# Patient Record
Sex: FEMALE | Race: BLACK OR AFRICAN AMERICAN | NOT HISPANIC OR LATINO | Employment: FULL TIME | URBAN - METROPOLITAN AREA
[De-identification: names, ages, dates, MRNs, and addresses within clinical notes are randomized per-mention and may not be internally consistent; named-entity substitution may affect disease eponyms.]

---

## 2017-01-04 ENCOUNTER — OFFICE VISIT (OUTPATIENT)
Dept: INTERNAL MEDICINE | Facility: CLINIC | Age: 32
End: 2017-01-04

## 2017-01-04 VITALS
SYSTOLIC BLOOD PRESSURE: 112 MMHG | BODY MASS INDEX: 39.56 KG/M2 | WEIGHT: 215 LBS | HEIGHT: 62 IN | DIASTOLIC BLOOD PRESSURE: 72 MMHG | TEMPERATURE: 98.1 F

## 2017-01-04 DIAGNOSIS — J01.00 ACUTE MAXILLARY SINUSITIS, RECURRENCE NOT SPECIFIED: Primary | ICD-10-CM

## 2017-01-04 DIAGNOSIS — Z72.0 TOBACCO ABUSE: ICD-10-CM

## 2017-01-04 PROCEDURE — 99213 OFFICE O/P EST LOW 20 MIN: CPT | Performed by: FAMILY MEDICINE

## 2017-01-04 RX ORDER — AMOXICILLIN AND CLAVULANATE POTASSIUM 875; 125 MG/1; MG/1
1 TABLET, FILM COATED ORAL 2 TIMES DAILY
Qty: 14 TABLET | Refills: 0 | Status: SHIPPED | OUTPATIENT
Start: 2017-01-04 | End: 2017-01-11

## 2017-01-04 NOTE — PROGRESS NOTES
"Subjective   Yasmin Singleton is a 31 y.o. female who presents with complaint of URI      History of Present Illness   Patient reports having nasal congestion, sinus pressure, runny nose, productive cough, sneezing, abdominal discomfort, nausea starting Monday.  Problem started 2 days ago.  Reports having sinus issues whenever the weather changes, but denies recurrent infections requiring antibiotic use.  Also denies having seasonal allergies.  Has not tried taking any OTC medications for her symptoms.  Did not get her influenza vaccine.  Reports recent sick contacts.  Continues to smoke about 1/2 PPD.    Review of Systems   Constitutional: Positive for chills. Negative for activity change, appetite change and fever.   HENT: Positive for congestion, rhinorrhea, sinus pressure, sneezing and sore throat (with coughing). Negative for nosebleeds.    Eyes: Negative for discharge and redness.   Respiratory: Positive for cough (productive). Negative for shortness of breath and wheezing.    Cardiovascular: Negative for chest pain and palpitations.   Gastrointestinal: Positive for nausea. Negative for abdominal pain, constipation, diarrhea and vomiting.        Positive for abdominal discomfort.   Neurological: Negative for dizziness, syncope and headaches.       The following portions of the patient's history were reviewed and updated as appropriate: allergies, current medications, past social history and problem list.    Objective   Visit Vitals   • /72   • Temp 98.1 °F (36.7 °C)   • Ht 62.25\" (158.1 cm)   • Wt 215 lb (97.5 kg)   • LMP 11/10/2016   • BMI 39.01 kg/m2     Physical Exam   Constitutional: She is oriented to person, place, and time. She appears well-developed and well-nourished.   No acute distress.   HENT:   Head: Normocephalic and atraumatic.   Right Ear: Hearing, tympanic membrane, external ear and ear canal normal.   Left Ear: Hearing, tympanic membrane, external ear and ear canal normal.   Nose: Mucosal " edema and rhinorrhea present. Right sinus exhibits no maxillary sinus tenderness and no frontal sinus tenderness. Left sinus exhibits no maxillary sinus tenderness and no frontal sinus tenderness.   Mouth/Throat: Mucous membranes are normal. Posterior oropharyngeal erythema present.   Eyes: Conjunctivae and EOM are normal. Pupils are equal, round, and reactive to light.   Neck: Normal range of motion. Neck supple.   Cardiovascular: Normal rate, regular rhythm, normal heart sounds and intact distal pulses.    Pulmonary/Chest: Effort normal and breath sounds normal. No respiratory distress. She has no wheezes.   Abdominal: Soft. Bowel sounds are normal. There is no tenderness.   Musculoskeletal: Normal range of motion.   Normal gait.   Lymphadenopathy:        Head (right side): Submandibular adenopathy present.        Head (left side): Submandibular adenopathy present.   Neurological: She is alert and oriented to person, place, and time.   Skin: Skin is warm and dry.   Psychiatric: She has a normal mood and affect. Her behavior is normal.   Vitals reviewed.      Assessment/Plan   Yasmin was seen today for uri.    Diagnoses and all orders for this visit:    Acute maxillary sinusitis, recurrence not specified  -     amoxicillin-clavulanate (AUGMENTIN) 875-125 MG per tablet; Take 1 tablet by mouth 2 (Two) Times a Day for 7 days.    Likely viral etiology at this time.  Recommended conservative treatment with warm compresses, nasal saline and OTC medications as needed for her symptoms.  A conditional prescription for Augmentin was also provided.  Discussed conditions for antibiotic use.  Advised patient to return to the clinic if their symptoms worsen despite antibiotic therapy.    Tobacco abuse    Encouraged smoking cessation today.

## 2017-01-04 NOTE — PATIENT INSTRUCTIONS
You likely have a sinus infection due to a virus.  Viral infections do not improve with antibiotics.  Recommend that you try using warm compresses, nasal saline and/or taking over the counter (OTC) medications for your symptoms as needed.  Please follow the package directions.  Recommend that you try the following OTC medications: DayQuil/NyQuil, Flonase, Tylenol, Ibuprofen as needed.  Symptoms will likely start to improve within 7-10 days.  I have given you an antibiotic prescription.  If your symptoms do not start improving after 10 days or you start experiencing worsening symptoms (including fevers, sweats, chills, headaches, shortness of breath), please fill the antibiotic prescription and take as directed.  Please work on quitting smoking.  Please follow-up as indicated.  Return to the clinic sooner if your symptoms worsen despite taking your antibiotic or if you have any other concerns.

## 2017-01-04 NOTE — MR AVS SNAPSHOT
Yasmin Singleton   1/4/2017 2:30 PM   Office Visit    Dept Phone:  [phone]   Encounter #:  [id number]    Provider:  Irena Lowery MD   Department:  [facility] PRIMARY CARE                Your Full Care Plan              Today's Medication Changes          These changes are accurate as of: 1/4/17  3:14 PM.  If you have any questions, ask your nurse or doctor.               New Medication(s)Ordered:     amoxicillin-clavulanate 875-125 MG per tablet   Commonly known as:  AUGMENTIN   Take 1 tablet by mouth 2 (Two) Times a Day for 7 days.   Started by:  Irena Lowery MD            Where to Get Your Medications      You can get these medications from any pharmacy     Bring a paper prescription for each of these medications     amoxicillin-clavulanate 875-125 MG per tablet                  Your Updated Medication List          This list is accurate as of: 1/4/17  3:14 PM.  Always use your most recent med list.                amoxicillin-clavulanate 875-125 MG per tablet   Commonly known as:  AUGMENTIN   Take 1 tablet by mouth 2 (Two) Times a Day for 7 days.       clomiPHENE 50 MG tablet   Commonly known as:  CLOMID   Take 1 tablet by mouth Daily. 1 po cycle days 3-7       glucose blood test strip   Check blood sugar twice a week as directed on package.       metFORMIN  MG 24 hr tablet   Commonly known as:  GLUCOPHAGE-XR   Take 1 tablet by mouth 2 (Two) Times a Day.       ONE TOUCH CLUB LANCETS misc   Use as directed on package.       prenatal vitamin 27-0.8 27-0.8 MG tablet tablet               You Were Diagnosed With        Codes Comments    Acute maxillary sinusitis, recurrence not specified    -  Primary ICD-10-CM: J01.00  ICD-9-CM: 461.0       Instructions    You likely have a sinus infection due to a virus.  Viral infections do not improve with antibiotics.  Recommend that you try using warm compresses, nasal saline and/or taking over the counter (OTC)  medications for your symptoms as needed.  Please follow the package directions.  Recommend that you try the following OTC medications: DayQuil/NyQuil, Flonase, Tylenol, Ibuprofen as needed.  Symptoms will likely start to improve within 7-10 days.  I have given you an antibiotic prescription.  If your symptoms do not start improving after 10 days or you start experiencing worsening symptoms (including fevers, sweats, chills, headaches, shortness of breath), please fill the antibiotic prescription and take as directed.  Please work on quitting smoking.  Please follow-up as indicated.  Return to the clinic sooner if your symptoms worsen despite taking your antibiotic or if you have any other concerns.     Patient Instructions History      Upcoming Appointments     Visit Type Date Time Department    OFFICE VISIT 2017  2:30 PM [facility]    FOLLOW UP 2017  1:30 PM [facility]    GYN FOLLOW UP 2017  2:40 PM [facility]    US [facility] NON-OB TRANSVAGINAL 2017  2:00 PM  [facility]      [employer] Signup     [facility] allows you to send messages to your doctor, view your test results, renew your prescriptions, schedule appointments, and more. To sign up, go to [employer] and click on the Sign Up Now link in the New User? box. Enter your [employer] Activation Code exactly as it appears below along with the last four digits of your Social Security Number and your Date of Birth () to complete the sign-up process. If you do not sign up before the expiration date, you must request a new code.    [employer] Activation Code: [email or web address]  Expires: 2017  3:14 PM    If you have questions, you can email [email or web address] or call [phone] to talk to our [employer] staff. Remember, [employer] is NOT to be used for urgent needs. For medical emergencies, dial 911.               Other Info from Your Visit           Your Appointments     2017  1:30 PM  "EST   Follow Up with Irena Lowery MD   [facility] PRIMARY [facility] (--)    [address]  [facility] [address] [phone]           Arrive 15 minutes prior to appointment.            Jun 13, 2017  2:00 PM EDT   US martina non-ob transvaginal with [facility]   [facility]    [place]              No prep. Arrive 30 minutes before your appointment.            Jun 13, 2017  2:40 PM EDT   GYN FOLLOW UP with Segundo Solano MD   [facility] (--)    [address]  [facility] [id number] [phone]              Allergies     No Known Allergies      Reason for Visit     URI           Vital Signs     Blood Pressure Temperature Height Weight Last Menstrual Period Body Mass Index    112/72 98.1 °F (36.7 °C) 62.25\" (158.1 cm) 215 lb (97.5 kg) 11/10/2016 39.01 kg/m2    Smoking Status                   Current Every Day Smoker           Problems and Diagnoses Noted     Acute maxillary sinusitis    -  Primary        "

## 2017-02-20 ENCOUNTER — OFFICE VISIT (OUTPATIENT)
Dept: INTERNAL MEDICINE | Facility: CLINIC | Age: 32
End: 2017-02-20

## 2017-02-20 ENCOUNTER — TRANSCRIBE ORDERS (OUTPATIENT)
Dept: LAB | Facility: HOSPITAL | Age: 32
End: 2017-02-20

## 2017-02-20 ENCOUNTER — APPOINTMENT (OUTPATIENT)
Dept: LAB | Facility: HOSPITAL | Age: 32
End: 2017-02-20

## 2017-02-20 VITALS
WEIGHT: 216.4 LBS | DIASTOLIC BLOOD PRESSURE: 70 MMHG | TEMPERATURE: 98.2 F | HEIGHT: 62 IN | BODY MASS INDEX: 39.82 KG/M2 | SYSTOLIC BLOOD PRESSURE: 122 MMHG

## 2017-02-20 DIAGNOSIS — R12 HEARTBURN: ICD-10-CM

## 2017-02-20 DIAGNOSIS — E11.9 TYPE 2 DIABETES MELLITUS WITHOUT COMPLICATION, WITHOUT LONG-TERM CURRENT USE OF INSULIN (HCC): Primary | ICD-10-CM

## 2017-02-20 DIAGNOSIS — Z00.00 ROUTINE GENERAL MEDICAL EXAMINATION AT A HEALTH CARE FACILITY: Primary | ICD-10-CM

## 2017-02-20 DIAGNOSIS — E28.2 PCOS (POLYCYSTIC OVARIAN SYNDROME): ICD-10-CM

## 2017-02-20 DIAGNOSIS — Z72.0 TOBACCO ABUSE: ICD-10-CM

## 2017-02-20 LAB
ALBUMIN SERPL-MCNC: 4.5 G/DL (ref 3.2–4.8)
ALBUMIN/GLOB SERPL: 1.6 G/DL (ref 1.5–2.5)
ALP SERPL-CCNC: 61 U/L (ref 25–100)
ALT SERPL W P-5'-P-CCNC: 31 U/L (ref 7–40)
ANION GAP SERPL CALCULATED.3IONS-SCNC: 6 MMOL/L (ref 3–11)
ARTICHOKE IGE QN: 125 MG/DL (ref 0–130)
AST SERPL-CCNC: 22 U/L (ref 0–33)
BILIRUB SERPL-MCNC: 0.3 MG/DL (ref 0.3–1.2)
BUN BLD-MCNC: 6 MG/DL (ref 9–23)
BUN/CREAT SERPL: 10 (ref 7–25)
CALCIUM SPEC-SCNC: 9.4 MG/DL (ref 8.7–10.4)
CHLORIDE SERPL-SCNC: 105 MMOL/L (ref 99–109)
CHOLEST SERPL-MCNC: 174 MG/DL (ref 0–200)
CO2 SERPL-SCNC: 27 MMOL/L (ref 20–31)
CREAT BLD-MCNC: 0.6 MG/DL (ref 0.6–1.3)
GFR SERPL CREATININE-BSD FRML MDRD: 142 ML/MIN/1.73
GLOBULIN UR ELPH-MCNC: 2.9 GM/DL
GLUCOSE BLD-MCNC: 90 MG/DL (ref 70–100)
HDLC SERPL-MCNC: 40 MG/DL (ref 40–60)
POTASSIUM BLD-SCNC: 4 MMOL/L (ref 3.5–5.5)
PROT SERPL-MCNC: 7.4 G/DL (ref 5.7–8.2)
SODIUM BLD-SCNC: 138 MMOL/L (ref 132–146)
TRIGL SERPL-MCNC: 77 MG/DL (ref 0–150)

## 2017-02-20 PROCEDURE — 80061 LIPID PANEL: CPT | Performed by: FAMILY MEDICINE

## 2017-02-20 PROCEDURE — 99214 OFFICE O/P EST MOD 30 MIN: CPT | Performed by: FAMILY MEDICINE

## 2017-02-20 PROCEDURE — 36415 COLL VENOUS BLD VENIPUNCTURE: CPT | Performed by: FAMILY MEDICINE

## 2017-02-20 PROCEDURE — 80053 COMPREHEN METABOLIC PANEL: CPT | Performed by: FAMILY MEDICINE

## 2017-02-20 NOTE — PROGRESS NOTES
"Subjective   Yasmin Singleton is a 31 y.o. female who presents to follow-up for Diabetes      History of Present Illness   She was last seen in the office on 11/09/16 for diabetes management.  Previous intervention/treatment includes: restarted Metformin, checked labs and refilled testing supplies.    Patient is here for follow-up of type 2 diabetes mellitus.  Current symptoms/problems include hyperglycemia.  She is adherent with her medication.  Patient states that her OB/Gyn increased her Metformin XR dosing regimen to 500 mg twice a day.    Current monitoring regimen: home blood tests - 2 times daily, meter recently stopped working but patient plans to call company to fix it  Home blood sugar records: 220s-400  Any episodes of hypoglycemia? no    Known diabetic complications: none  Cardiovascular risk factors: obesity (BMI >= 30 kg/m2), sedentary lifestyle and smoking/ tobacco exposure  Current diabetic medications include oral agent (monotherapy): Glucophage XR    Eye exam current (within one year): no  Weight trend: stable  Prior visit with dietician: no  Current diet: in general, an \"unhealthy\" diet  Current exercise: jumping jacks and sit ups    She is on an ACE inhibitor or angiotensin II receptor blocker: no  She is on a statin: no  She is on Aspirin: no    Patient is due for labs.    Patient also reports complaint of heartburn for the past 2 weeks.  Unsure of specific triggers.  Continues to smoke cigarettes.  Has tried taking Tums in the past, which helps for a short period of time.  Denies vomiting, hematemesis, diarrhea.    Review of Systems   Constitutional: Negative for chills and fever.   HENT: Positive for sore throat (with heartburn).    Respiratory: Negative for cough, shortness of breath and wheezing.    Cardiovascular: Negative for chest pain and palpitations.   Gastrointestinal: Positive for nausea (with heartburn). Negative for abdominal pain, constipation, diarrhea and vomiting.        Positive " "for heartburn.   Neurological: Negative for dizziness, syncope and headaches.   Psychiatric/Behavioral: The patient is not nervous/anxious.         Negative for depressed mood.     The following portions of the patient's history were reviewed and updated as appropriate: current medications, past medical history, past social history and problem list.    Objective   Visit Vitals   • /70 (BP Location: Left arm, Patient Position: Sitting, Cuff Size: Adult)   • Temp 98.2 °F (36.8 °C) (Temporal Artery )   • Ht 62.25\" (158.1 cm)   • Wt 216 lb 6.4 oz (98.2 kg)   • BMI 39.26 kg/m2     Physical Exam   Constitutional: She is oriented to person, place, and time. She appears well-developed and well-nourished.   No acute distress.   HENT:   Head: Normocephalic and atraumatic.   Eyes: Conjunctivae and EOM are normal.   Neck: Normal range of motion.   Cardiovascular: Normal rate, regular rhythm, normal heart sounds and intact distal pulses.    Pulmonary/Chest: Effort normal and breath sounds normal. She has no wheezes.   Abdominal: Soft. Bowel sounds are normal. There is no tenderness.   Musculoskeletal: Normal range of motion.   Moves all extremities.   Neurological: She is alert and oriented to person, place, and time.   Skin: Skin is warm and dry.   Psychiatric: She has a normal mood and affect. Her behavior is normal.       Assessment/Plan   Yasmin was seen today for diabetes.    Diagnoses and all orders for this visit:    Type 2 diabetes mellitus without complication, without long-term current use of insulin  -     Comprehensive Metabolic Panel  -     Lipid Panel  -     POC Glycosylated Hemoglobin (Hb A1C)    The above labs were ordered today.  Patient states that her OB/Gyn has been prescribing her Metformin for PCOS management and did not request any refills today.  Will consider starting patient on a baby aspirin and statin medication if indicated by her labs and ASCVD risk.  Advised patient to return to the clinic in " 3 months for next routine follow-up.    Heartburn    Recommended that patient try taking Zantac as needed for her symptoms.  Also recommended other lifestyle changes to help with symptom management.  Advised patient to return to the clinic if their symptoms worsen.    Tobacco abuse    Encouraged smoking cessation today.

## 2017-02-20 NOTE — PATIENT INSTRUCTIONS
Please go to the lab before you leave to have your labs drawn.  You will be called or receive a letter with your results.  I will consider starting you on a baby aspirin and a cholesterol medication based on what your labs show.  Recommend that you try taking Zantac as needed for your heartburn symptoms.  To help manage your heartburn/reflux symptoms, recommend that you avoid caffeine, alcohol, tobacco and spicy/greasy foods.  Sleep with the head of the bed slightly elevated to decrease reflux symptoms at night.  Avoid eating 3-4 hours prior to bedtime.   Please continue taking your other current medications as directed.  Please follow-up as indicated.  Return to the clinic sooner if your symptoms worsen or if you have any other concerns.

## 2017-02-23 ENCOUNTER — LAB (OUTPATIENT)
Dept: INTERNAL MEDICINE | Facility: CLINIC | Age: 32
End: 2017-02-23

## 2017-02-23 ENCOUNTER — TELEPHONE (OUTPATIENT)
Dept: INTERNAL MEDICINE | Facility: CLINIC | Age: 32
End: 2017-02-23

## 2017-02-23 DIAGNOSIS — E11.9 TYPE 2 DIABETES MELLITUS WITHOUT COMPLICATION, WITHOUT LONG-TERM CURRENT USE OF INSULIN (HCC): Primary | ICD-10-CM

## 2017-02-23 LAB — HBA1C MFR BLD: 7.3 %

## 2017-02-23 PROCEDURE — 83036 HEMOGLOBIN GLYCOSYLATED A1C: CPT | Performed by: FAMILY MEDICINE

## 2017-02-24 ENCOUNTER — TELEPHONE (OUTPATIENT)
Dept: INTERNAL MEDICINE | Facility: CLINIC | Age: 32
End: 2017-02-24

## 2017-02-24 DIAGNOSIS — E11.9 TYPE 2 DIABETES MELLITUS WITHOUT COMPLICATION, WITHOUT LONG-TERM CURRENT USE OF INSULIN (HCC): ICD-10-CM

## 2017-02-24 RX ORDER — METFORMIN HYDROCHLORIDE EXTENDED-RELEASE TABLETS 1000 MG/1
1000 TABLET, FILM COATED, EXTENDED RELEASE ORAL 2 TIMES DAILY
Qty: 60 TABLET | Refills: 2 | Status: SHIPPED | OUTPATIENT
Start: 2017-02-24 | End: 2017-05-22

## 2017-02-27 ENCOUNTER — TELEPHONE (OUTPATIENT)
Dept: INTERNAL MEDICINE | Facility: CLINIC | Age: 32
End: 2017-02-27

## 2017-05-22 ENCOUNTER — HOSPITAL ENCOUNTER (OUTPATIENT)
Dept: GENERAL RADIOLOGY | Facility: HOSPITAL | Age: 32
Discharge: HOME | End: 2017-05-22
Admitting: FAMILY MEDICINE

## 2017-05-22 ENCOUNTER — OFFICE VISIT (OUTPATIENT)
Dept: INTERNAL MEDICINE | Facility: CLINIC | Age: 32
End: 2017-05-22

## 2017-05-22 VITALS
RESPIRATION RATE: 20 BRPM | DIASTOLIC BLOOD PRESSURE: 90 MMHG | SYSTOLIC BLOOD PRESSURE: 124 MMHG | BODY MASS INDEX: 39.75 KG/M2 | HEART RATE: 86 BPM | HEIGHT: 62 IN | WEIGHT: 216 LBS

## 2017-05-22 DIAGNOSIS — Z72.0 TOBACCO ABUSE: ICD-10-CM

## 2017-05-22 DIAGNOSIS — E11.9 TYPE 2 DIABETES MELLITUS WITHOUT COMPLICATION, WITHOUT LONG-TERM CURRENT USE OF INSULIN (HCC): ICD-10-CM

## 2017-05-22 DIAGNOSIS — J40 BRONCHITIS: Primary | ICD-10-CM

## 2017-05-22 PROCEDURE — 71020 HC CHEST PA AND LATERAL: CPT

## 2017-05-22 PROCEDURE — 99213 OFFICE O/P EST LOW 20 MIN: CPT | Performed by: FAMILY MEDICINE

## 2017-05-22 RX ORDER — METFORMIN HYDROCHLORIDE 500 MG/1
500 TABLET, EXTENDED RELEASE ORAL
COMMUNITY
Start: 2017-05-13 | End: 2017-08-03

## 2017-05-22 RX ORDER — PREDNISONE 20 MG/1
40 TABLET ORAL DAILY
Qty: 10 TABLET | Refills: 0 | Status: SHIPPED | OUTPATIENT
Start: 2017-05-22 | End: 2017-06-20

## 2017-05-22 RX ORDER — LISINOPRIL 5 MG/1
5 TABLET ORAL DAILY
Qty: 30 TABLET | Refills: 2 | Status: SHIPPED | OUTPATIENT
Start: 2017-05-22 | End: 2017-08-23 | Stop reason: SDDI

## 2017-05-22 RX ORDER — ALBUTEROL SULFATE 90 UG/1
2 AEROSOL, METERED RESPIRATORY (INHALATION) EVERY 6 HOURS PRN
Qty: 1 INHALER | Refills: 0 | Status: SHIPPED | OUTPATIENT
Start: 2017-05-22 | End: 2017-06-20

## 2017-05-23 ENCOUNTER — TELEPHONE (OUTPATIENT)
Dept: INTERNAL MEDICINE | Facility: CLINIC | Age: 32
End: 2017-05-23

## 2017-05-23 DIAGNOSIS — E11.9 TYPE 2 DIABETES MELLITUS WITHOUT COMPLICATION, WITHOUT LONG-TERM CURRENT USE OF INSULIN (HCC): Primary | ICD-10-CM

## 2017-05-25 RX ORDER — LANCETS
EACH MISCELLANEOUS
Qty: 100 EACH | Refills: 12 | Status: SHIPPED | OUTPATIENT
Start: 2017-05-25 | End: 2018-07-02 | Stop reason: SDUPTHER

## 2017-06-20 ENCOUNTER — OFFICE VISIT (OUTPATIENT)
Dept: OBSTETRICS AND GYNECOLOGY | Facility: CLINIC | Age: 32
End: 2017-06-20

## 2017-06-20 VITALS
SYSTOLIC BLOOD PRESSURE: 118 MMHG | WEIGHT: 221 LBS | DIASTOLIC BLOOD PRESSURE: 70 MMHG | BODY MASS INDEX: 40.1 KG/M2 | RESPIRATION RATE: 16 BRPM

## 2017-06-20 DIAGNOSIS — E28.2 PCOS (POLYCYSTIC OVARIAN SYNDROME): Primary | ICD-10-CM

## 2017-06-20 DIAGNOSIS — E11.9 TYPE 2 DIABETES MELLITUS WITHOUT COMPLICATION, WITHOUT LONG-TERM CURRENT USE OF INSULIN (HCC): ICD-10-CM

## 2017-06-20 DIAGNOSIS — Z72.0 TOBACCO ABUSE: ICD-10-CM

## 2017-06-20 PROCEDURE — 99213 OFFICE O/P EST LOW 20 MIN: CPT | Performed by: OBSTETRICS & GYNECOLOGY

## 2017-06-20 NOTE — PROGRESS NOTES
Subjective   Chief Complaint   Patient presents with   • Results     u/s here 17     Yasmin Singleton is a 31 y.o. year old  presenting to be seen with complaints of trouble with her menses.   Current birth control method: none.    The last time she recalls having predictable regular cycles was she has had regular cycles for the past 6 months until she missed one last month.  Had a period on 2017.  Had an ultrasound on cycle day 7 or so and did show small corpus luteum cyst.  She has not been following her diet.  She has not been exercising.  Her sugars are running in the 180s.  Her hemoglobin A1c has fallen from 7.3-6.7 but I told her this is still elevated.  I discussed that with diabetes she would be at increased risk for having a baby with congenital abnormalities.  This also could damage her kidneys and vision, etc.  Have suggested that she really follow a diet that I gave her in the past and she admits she's not doing that..  I would like to make a referral to nutrition so that she can eat healthier.  If that is not successful she may need insulin before conception.  Therefore we'll not refill Clomid.  I don't think she ever really check to see if she ovulated by urine ovulation test kits.  I would like to check a progesterone level on about cycle day 21 or 23 and a fasting insulin and sugar.    Her current partner has a 4-year-old child.  No semen analysis done lately.    · Additional notable symptoms: none  · Changes in weight: weight has been stable  · Recent increase in stress? no  · Is there associated pain ? no    Past 6 month menstrual and contraceptive history:    Patient's last menstrual period was 2017.  Cycle Frequency: regular, predictable and consistent every 28 - 32 days   Menstrual cycle character: flow is typically normal   Cycle Duration: 4 - 5   Number of heavy days of flows: 1   Intermenstrual bleeding present: no   Post-coital bleeding present: no     She is sexually  active.  In the past 12 months there has not been new sexual partners.  Condoms are not typically used.  She would not like to be screened for STD's at today's exam.     The following portions of the patient's history were reviewed and updated as appropriate:current medications, allergies and past surgical history    Review of Systems      Objective   /70  Resp 16  Wt 221 lb (100 kg)  LMP 06/06/2017 Comment: irreg cycles  BMI 40.1 kg/m2    General:  well developed; well nourished  no acute distress  obese - Body mass index is 40.1 kg/(m^2).   Skin:  No suspicious lesions seen   Thyroid: not examined   Lungs:  breathing is unlabored   Heart:  Not performed.   Abdomen: Not performed.   Pelvis: Not performed.     Lab Review   Hemoglobin at hemoglobin A1c etc.    Imaging   Pelvic ultrasound       Assessment   1. Anovulation probably PCO OS./Metabolic condition  2. Her diabetes has not been the best of control and I like her to optimize his prior to conception.  3. Smoking half pack per day and have encouraged her to quit smoking cigarettes     Plan   1. See above nutrition consultation, labs, possible referral to PDC.  2. Follow-up in 3 months sooner should there be any problems    Medications Rx this encounter:  No orders of the defined types were placed in this encounter.         This note was electronically signed.    Segundo Solano MD  June 20, 2017

## 2017-06-25 ENCOUNTER — RESULTS ENCOUNTER (OUTPATIENT)
Dept: OBSTETRICS AND GYNECOLOGY | Facility: CLINIC | Age: 32
End: 2017-06-25

## 2017-06-25 DIAGNOSIS — E28.2 PCOS (POLYCYSTIC OVARIAN SYNDROME): ICD-10-CM

## 2017-06-25 DIAGNOSIS — E11.9 TYPE 2 DIABETES MELLITUS WITHOUT COMPLICATION, WITHOUT LONG-TERM CURRENT USE OF INSULIN (HCC): ICD-10-CM

## 2017-06-28 ENCOUNTER — RESULTS ENCOUNTER (OUTPATIENT)
Dept: OBSTETRICS AND GYNECOLOGY | Facility: CLINIC | Age: 32
End: 2017-06-28

## 2017-06-28 DIAGNOSIS — E28.2 PCOS (POLYCYSTIC OVARIAN SYNDROME): ICD-10-CM

## 2017-06-28 DIAGNOSIS — E11.9 TYPE 2 DIABETES MELLITUS WITHOUT COMPLICATION, WITHOUT LONG-TERM CURRENT USE OF INSULIN (HCC): ICD-10-CM

## 2017-07-05 LAB
ALBUMIN SERPL-MCNC: 4.5 G/DL (ref 3.2–4.8)
ALBUMIN/GLOB SERPL: 1.6 G/DL
ALP SERPL-CCNC: 64 U/L (ref 25–100)
ALT SERPL-CCNC: 41 U/L (ref 7–40)
AST SERPL-CCNC: 29 U/L (ref 0–33)
BILIRUB SERPL-MCNC: 0.2 MG/DL (ref 0.3–1.2)
BUN SERPL-MCNC: 8 MG/DL (ref 9–23)
BUN/CREAT SERPL: 13.3 (ref 7–25)
CALCIUM SERPL-MCNC: 9.6 MG/DL (ref 8.7–10.4)
CHLORIDE SERPL-SCNC: 107 MMOL/L (ref 99–109)
CHOLEST SERPL-MCNC: 176 MG/DL (ref 0–200)
CO2 SERPL-SCNC: 30 MMOL/L (ref 20–31)
CREAT SERPL-MCNC: 0.6 MG/DL (ref 0.6–1.3)
GLOBULIN SER CALC-MCNC: 2.8 G/DL
GLUCOSE SERPL-MCNC: 181 MG/DL (ref 70–100)
HDLC SERPL-MCNC: 36 MG/DL (ref 40–60)
LDLC SERPL CALC-MCNC: 111 MG/DL (ref 0–99)
POTASSIUM SERPL-SCNC: 5.1 MMOL/L (ref 3.5–5.5)
PROT SERPL-MCNC: 7.3 G/DL (ref 5.7–8.2)
SODIUM SERPL-SCNC: 138 MMOL/L (ref 132–146)
TRIGL SERPL-MCNC: 144 MG/DL (ref 0–150)
VLDLC SERPL CALC-MCNC: 29 MG/DL

## 2017-07-06 ENCOUNTER — HOSPITAL ENCOUNTER (OUTPATIENT)
Dept: NUTRITION | Facility: HOSPITAL | Age: 32
Setting detail: RECURRING SERIES
End: 2017-07-06

## 2017-08-03 ENCOUNTER — TELEPHONE (OUTPATIENT)
Dept: INTERNAL MEDICINE | Facility: CLINIC | Age: 32
End: 2017-08-03

## 2017-08-03 NOTE — TELEPHONE ENCOUNTER
Dr. hardy has decided to change metformin to the metformin without the E.R. 2x daily. Pt advised!

## 2017-08-23 ENCOUNTER — OFFICE VISIT (OUTPATIENT)
Dept: INTERNAL MEDICINE | Facility: CLINIC | Age: 32
End: 2017-08-23

## 2017-08-23 VITALS
HEIGHT: 62 IN | BODY MASS INDEX: 38.46 KG/M2 | WEIGHT: 209 LBS | OXYGEN SATURATION: 99 % | SYSTOLIC BLOOD PRESSURE: 118 MMHG | DIASTOLIC BLOOD PRESSURE: 76 MMHG | RESPIRATION RATE: 18 BRPM

## 2017-08-23 DIAGNOSIS — R22.9 SOFT TISSUE SWELLING: ICD-10-CM

## 2017-08-23 DIAGNOSIS — E11.9 TYPE 2 DIABETES MELLITUS WITHOUT COMPLICATION, WITHOUT LONG-TERM CURRENT USE OF INSULIN (HCC): Primary | ICD-10-CM

## 2017-08-23 DIAGNOSIS — Z72.0 TOBACCO ABUSE: ICD-10-CM

## 2017-08-23 LAB
ALBUMIN SERPL-MCNC: 4.7 G/DL (ref 3.2–4.8)
ALBUMIN/GLOB SERPL: 1.7 G/DL (ref 1.5–2.5)
ALP SERPL-CCNC: 68 U/L (ref 25–100)
ALT SERPL-CCNC: 34 U/L (ref 7–40)
AST SERPL-CCNC: 23 U/L (ref 0–33)
BILIRUB SERPL-MCNC: 0.3 MG/DL (ref 0.3–1.2)
BUN SERPL-MCNC: 5 MG/DL (ref 9–23)
BUN/CREAT SERPL: 7.1 (ref 7–25)
CALCIUM SERPL-MCNC: 10.3 MG/DL (ref 8.7–10.4)
CHLORIDE SERPL-SCNC: 107 MMOL/L (ref 99–109)
CHOLEST SERPL-MCNC: 183 MG/DL (ref 0–200)
CO2 SERPL-SCNC: 30 MMOL/L (ref 20–31)
CREAT SERPL-MCNC: 0.7 MG/DL (ref 0.6–1.3)
GLOBULIN SER CALC-MCNC: 2.7 GM/DL
GLUCOSE SERPL-MCNC: 112 MG/DL (ref 70–100)
HBA1C MFR BLD: 7 %
HDLC SERPL-MCNC: 38 MG/DL (ref 40–60)
LDLC SERPL CALC-MCNC: 113 MG/DL (ref 0–100)
POTASSIUM SERPL-SCNC: 4.4 MMOL/L (ref 3.5–5.5)
PROT SERPL-MCNC: 7.4 G/DL (ref 5.7–8.2)
SODIUM SERPL-SCNC: 139 MMOL/L (ref 132–146)
TRIGL SERPL-MCNC: 160 MG/DL (ref 0–150)
VLDLC SERPL CALC-MCNC: 32 MG/DL

## 2017-08-23 PROCEDURE — 83036 HEMOGLOBIN GLYCOSYLATED A1C: CPT | Performed by: FAMILY MEDICINE

## 2017-08-23 PROCEDURE — 99406 BEHAV CHNG SMOKING 3-10 MIN: CPT | Performed by: FAMILY MEDICINE

## 2017-08-23 PROCEDURE — 99214 OFFICE O/P EST MOD 30 MIN: CPT | Performed by: FAMILY MEDICINE

## 2017-08-23 RX ORDER — ALOGLIPTIN AND METFORMIN HYDROCHLORIDE 12.5; 5 MG/1; MG/1
1 TABLET, FILM COATED ORAL 2 TIMES DAILY
Qty: 60 TABLET | Refills: 2 | Status: SHIPPED | OUTPATIENT
Start: 2017-08-23 | End: 2017-12-02 | Stop reason: SDUPTHER

## 2017-08-23 NOTE — PROGRESS NOTES
"Subjective   Yasmin Singleton is a 31 y.o. female who presents to follow-up for Diabetes      History of Present Illness   She was last seen in the office on 05/22/17 for diabetes management.  Previous intervention/treatment includes: continued on Metformin and started on Lisinopril.    Patient is here for follow-up of type 2 diabetes mellitus.  Current symptoms/problems include none.  She is adherent with medication, but states that she has noticed her pill in her stool, mainly when she has diarrhea.     Current monitoring regimen: was previously checking once a day, but states that her insurance no longer covers her lancets and test strips  Home blood sugar records: 188-200  Any episodes of hypoglycemia? no     Known diabetic complications: none  Cardiovascular risk factors: diabetes mellitus, obesity (BMI >= 30 kg/m2), sedentary lifestyle and smoking/ tobacco exposure  Current diabetic medications include oral agent (monotherapy): Glucophage (switched from XR to IR)     Eye exam current (within one year): no, counseled  Weight trend: stable  Current diet: has been trying to cut back on sodas and limit her carb intake  Current exercise: walking \"a little\"     She is on an ACE inhibitor or angiotensin II receptor blocker: no  She is on a statin: no  She is on Aspirin: no  Continues to smoke 1/2 PPD.    Patient is due for labs.    Also reports complaint of a small lump in her right axilla, noticed about 2 weeks ago.  States that lump is tender to touch when she showers.  Lump has been decreasing in size for the past one week.  Denies fevers, sweats, chills, overlying erythema, surrounding induration, purulent discharge or bleeding.    Review of Systems   Constitutional: Negative for chills and fever.   Respiratory: Negative for cough, shortness of breath and wheezing.    Cardiovascular: Negative for chest pain and palpitations.   Gastrointestinal: Negative for abdominal pain, constipation, diarrhea, nausea and " "vomiting.   Skin: Negative for rash and wound.        Positive for palpable tissue mass in right axilla.   Neurological: Negative for dizziness, syncope and headaches.     The following portions of the patient's history were reviewed and updated as appropriate: current medications, past medical history, past social history and problem list.    Objective   /76  Resp 18  Ht 62.25\" (158.1 cm)  Wt 209 lb (94.8 kg)  SpO2 99%  BMI 37.92 kg/m2     Physical Exam   Constitutional: She is oriented to person, place, and time. She appears well-developed and well-nourished.   No acute distress.   HENT:   Head: Normocephalic and atraumatic.   Eyes: Conjunctivae and EOM are normal.   Neck: Normal range of motion.   Cardiovascular: Normal rate, regular rhythm, normal heart sounds and intact distal pulses.    Pulmonary/Chest: Effort normal and breath sounds normal. She has no wheezes.   Abdominal: Soft. Bowel sounds are normal. There is no tenderness.   Musculoskeletal: Normal range of motion.   Moves all extremities.       Neurological Sensory Findings - Unaltered hot/cold right ankle/foot discrimination and unaltered hot/cold left ankle/foot discrimination. Unaltered sharp/dull right ankle/foot discrimination and unaltered sharp/dull left ankle/foot discrimination. No right ankle/foot altered proprioception and no left ankle/foot altered proprioception    Vascular Status -  Her exam exhibits right foot vasculature normal. Her exam exhibits no right foot edema. Her exam exhibits left foot vasculature normal. Her exam exhibits no left foot edema.   Skin Integrity  -  Her right foot skin is intact.     Yasmin 's left foot skin is intact. .  Neurological: She is alert and oriented to person, place, and time.   Skin: Skin is warm and dry.   Palpable approximately 1 cm mass, mobile, no overlying erythema or surrounding induration, mildly tender to palpation, no purulent discharge or bleeding noted.   Psychiatric: She has a " normal mood and affect. Her behavior is normal.   Vitals reviewed.    Assessment/Plan   Yasmin was seen today for diabetes.    Diagnoses and all orders for this visit:    Type 2 diabetes mellitus without complication, without long-term current use of insulin  -     Comprehensive Metabolic Panel  -     Lipid Panel  -     POC Glycosylated Hemoglobin (Hb A1C)  -     MicroAlbumin, Urine, Random  -     Alogliptin-Metformin HCl 12.5-500 MG tablet; Take 1 tablet by mouth 2 (Two) Times a Day.    The above labs were ordered today.  Hgb A1C has increased from 6.7 to 7.0.  Discussed with patient today.  Patient reports seeing her Metformin in her stool and has started taking her medication once a day instead of twice a day.  Will stop Metformin and prescribe the above medication instead.  Recommended that patient schedule an appointment for her diabetic eye exam.  Advised patient to return to the clinic in 3 months for next routine follow-up.     Tobacco abuse    I advised the patient of the risks in continuing to use tobacco.  I also discussed how to quit smoking and the patient has expressed the willingness to quit, but wants to continue quitting on her own.      During this visit, I spent 3-10 mintues counseling the patient regarding smoking cessation.     Soft tissue swelling    Possible cyst versus lymph node.  Improving symptoms per patient report.  Recommended that she continue to monitor and follow-up if symptoms persist or worsen.    More than 15 minutes of this office visit was spent on counseling.

## 2017-08-23 NOTE — PATIENT INSTRUCTIONS
Please go to the lab before you leave to have your labs drawn.  You will be called or receive a letter with your results.  Please stop taking your Metformin and start taking your Janumet instead.  Your new medication has been electronically prescribed and will be at your pharmacy.  Please pick this up and take as directed.  Please schedule an appointment for your diabetic eye exam with an eye doctor.  Please work on quitting smoking.  Please continue taking your other current medications as directed.  Please follow-up as indicated.  Return to the clinic sooner if you have any other concerns.

## 2017-08-24 LAB — MICROALBUMIN UR-MCNC: 16.5 UG/ML

## 2017-09-14 ENCOUNTER — TELEPHONE (OUTPATIENT)
Dept: INTERNAL MEDICINE | Facility: CLINIC | Age: 32
End: 2017-09-14

## 2017-09-14 NOTE — TELEPHONE ENCOUNTER
Pt has a Accucheck Skylar Plus glucose meter and Freestyle lancets and strips. They do not fit. Can pt get something that will work together. Please send to pharmacy and give pt a call to advise.

## 2017-09-21 ENCOUNTER — TELEPHONE (OUTPATIENT)
Dept: INTERNAL MEDICINE | Facility: CLINIC | Age: 32
End: 2017-09-21

## 2017-09-21 NOTE — TELEPHONE ENCOUNTER
Called insurance states free style brand is covered sometimes billing can bill to the  causing insurance to reject. She advised me she will call pharmacy and walk them through the process so it can get approved she also stated 204 lancets per 30 days would be covered when needed.

## 2017-09-27 ENCOUNTER — HOSPITAL ENCOUNTER (OUTPATIENT)
Dept: NUTRITION | Facility: HOSPITAL | Age: 32
Setting detail: RECURRING SERIES
Discharge: HOME | End: 2017-09-27

## 2017-09-27 VITALS — HEIGHT: 62 IN | WEIGHT: 208.5 LBS | BODY MASS INDEX: 38.37 KG/M2

## 2017-09-27 PROCEDURE — 97802 MEDICAL NUTRITION INDIV IN: CPT | Performed by: DIETITIAN, REGISTERED

## 2017-09-29 ENCOUNTER — OFFICE VISIT (OUTPATIENT)
Dept: OBSTETRICS AND GYNECOLOGY | Facility: CLINIC | Age: 32
End: 2017-09-29

## 2017-09-29 VITALS
RESPIRATION RATE: 16 BRPM | BODY MASS INDEX: 37.56 KG/M2 | SYSTOLIC BLOOD PRESSURE: 118 MMHG | DIASTOLIC BLOOD PRESSURE: 70 MMHG | WEIGHT: 207 LBS

## 2017-09-29 DIAGNOSIS — Z72.0 TOBACCO ABUSE: ICD-10-CM

## 2017-09-29 DIAGNOSIS — E28.2 PCOS (POLYCYSTIC OVARIAN SYNDROME): Primary | ICD-10-CM

## 2017-09-29 PROCEDURE — 99213 OFFICE O/P EST LOW 20 MIN: CPT | Performed by: OBSTETRICS & GYNECOLOGY

## 2017-09-29 NOTE — PROGRESS NOTES
Subjective   Chief Complaint   Patient presents with   • Follow-up     Yasmin Singleton is a 32 y.o. year old .  Patient's last menstrual period was 2017.  She presents to be seen because of PCOS follow up . Sugars running 52 to 200 ; she has one more nutrition class - discussed avoiding pasta.potatoes, rice breads as well.   Current birth control method: none.    Past 6 month menstrual and contraceptive history:    Patient's last menstrual period was 2017.  Cycle Frequency: vary between 29 and 35 days   Menstrual cycle character: flow is typically normal   Cycle Duration: 5 - 6 1st 2 days are heavy                   The following portions of the patient's history were reviewed and updated as appropriate:problem list, current medications and allergies    Review of Systems normal bladder - loose stool on regular diarrhea - noted metformin in stool     Objective   /70  Resp 16  Wt 207 lb (93.9 kg)  LMP 2017 Comment: 28 cycle days  BMI 37.56 kg/m2    General:  well developed; well nourished  no acute distress  appears stated age   Skin:  No suspicious lesions seen  Hyperpigmentation noted on neck c/w hirsutism   Thyroid: normal to inspection and palpation   Lungs:  breathing is unlabored   Heart:  Not performed.   Abdomen: soft, non-tender; no masses  no umbilical or inginual hernias are present  no hepato-splenomegaly   Pelvis: Not performed.     Lab Review   CMP and hgb A1c     Imaging   No data reviewed       Assessment   1. PCOS with irregular cycles  2. Smoker - needs to quit!  3. Diabetes type 2 - on new form of metformin     Plan   1. Chart cycles   2. Stop smoking !!  3. Timed IC   4. PNV?/MTV otc is okay    Medications Rx this encounter:  New Medications Ordered This Visit   Medications   • clomiPHENE (CLOMID) 50 MG tablet     Sig: Take 1 tablet by mouth Daily. 1 po cycle days 3-7     Dispense:  5 tablet     Refill:  3          This note was electronically signed.    Segundo DOMÍNGUEZ  MD [place]  September 29, 2017

## 2017-10-23 ENCOUNTER — HOSPITAL ENCOUNTER (OUTPATIENT)
Dept: NUTRITION | Facility: HOSPITAL | Age: 32
Setting detail: RECURRING SERIES
Discharge: HOME | End: 2017-10-23

## 2017-10-23 VITALS — BODY MASS INDEX: 39.2 KG/M2 | WEIGHT: 213 LBS | HEIGHT: 62 IN

## 2017-12-02 DIAGNOSIS — E11.9 TYPE 2 DIABETES MELLITUS WITHOUT COMPLICATION, WITHOUT LONG-TERM CURRENT USE OF INSULIN (HCC): ICD-10-CM

## 2017-12-04 ENCOUNTER — OFFICE VISIT (OUTPATIENT)
Dept: INTERNAL MEDICINE | Facility: CLINIC | Age: 32
End: 2017-12-04

## 2017-12-04 VITALS
HEIGHT: 62 IN | DIASTOLIC BLOOD PRESSURE: 80 MMHG | BODY MASS INDEX: 38.46 KG/M2 | WEIGHT: 209 LBS | RESPIRATION RATE: 18 BRPM | SYSTOLIC BLOOD PRESSURE: 118 MMHG

## 2017-12-04 DIAGNOSIS — Z72.0 TOBACCO ABUSE: ICD-10-CM

## 2017-12-04 DIAGNOSIS — E11.9 TYPE 2 DIABETES MELLITUS WITHOUT COMPLICATION, WITHOUT LONG-TERM CURRENT USE OF INSULIN (HCC): Primary | ICD-10-CM

## 2017-12-04 DIAGNOSIS — Z23 NEED FOR IMMUNIZATION AGAINST INFLUENZA: ICD-10-CM

## 2017-12-04 LAB
ALBUMIN SERPL-MCNC: 4.6 G/DL (ref 3.2–4.8)
ALBUMIN/GLOB SERPL: 1.7 G/DL (ref 1.5–2.5)
ALP SERPL-CCNC: 62 U/L (ref 25–100)
ALT SERPL-CCNC: 33 U/L (ref 7–40)
AST SERPL-CCNC: 18 U/L (ref 0–33)
BILIRUB SERPL-MCNC: 0.3 MG/DL (ref 0.3–1.2)
BUN SERPL-MCNC: 8 MG/DL (ref 9–23)
BUN/CREAT SERPL: 11.4 (ref 7–25)
CALCIUM SERPL-MCNC: 9.9 MG/DL (ref 8.7–10.4)
CHLORIDE SERPL-SCNC: 106 MMOL/L (ref 99–109)
CHOLEST SERPL-MCNC: 161 MG/DL (ref 0–200)
CO2 SERPL-SCNC: 29 MMOL/L (ref 20–31)
CREAT SERPL-MCNC: 0.7 MG/DL (ref 0.6–1.3)
GFR SERPLBLD CREATININE-BSD FMLA CKD-EPI: 118 ML/MIN/1.73
GFR SERPLBLD CREATININE-BSD FMLA CKD-EPI: 98 ML/MIN/1.73
GLOBULIN SER CALC-MCNC: 2.7 GM/DL
GLUCOSE SERPL-MCNC: 144 MG/DL (ref 70–100)
HBA1C MFR BLD: 6.8 %
HDLC SERPL-MCNC: 45 MG/DL (ref 40–60)
LDLC SERPL CALC-MCNC: 86 MG/DL (ref 0–100)
POTASSIUM SERPL-SCNC: 4.3 MMOL/L (ref 3.5–5.5)
PROT SERPL-MCNC: 7.3 G/DL (ref 5.7–8.2)
SODIUM SERPL-SCNC: 141 MMOL/L (ref 132–146)
TRIGL SERPL-MCNC: 150 MG/DL (ref 0–150)
VLDLC SERPL CALC-MCNC: 30 MG/DL

## 2017-12-04 PROCEDURE — 90686 IIV4 VACC NO PRSV 0.5 ML IM: CPT | Performed by: FAMILY MEDICINE

## 2017-12-04 PROCEDURE — 90471 IMMUNIZATION ADMIN: CPT | Performed by: FAMILY MEDICINE

## 2017-12-04 PROCEDURE — 99213 OFFICE O/P EST LOW 20 MIN: CPT | Performed by: FAMILY MEDICINE

## 2017-12-04 PROCEDURE — 83036 HEMOGLOBIN GLYCOSYLATED A1C: CPT | Performed by: FAMILY MEDICINE

## 2017-12-04 RX ORDER — ALOGLIPTIN AND METFORMIN HYDROCHLORIDE 12.5; 5 MG/1; MG/1
TABLET, FILM COATED ORAL
Qty: 60 TABLET | Refills: 1 | Status: SHIPPED | OUTPATIENT
Start: 2017-12-04 | End: 2018-02-07 | Stop reason: SDUPTHER

## 2017-12-04 NOTE — PROGRESS NOTES
"Subjective   Yasmin Singleton is a 32 y.o. female who presents to follow-up for Diabetes      History of Present Illness   She was last seen in the office on 08/23/17 for diabetes management.  Previous intervention/treatment includes: switched from Metformin to Alogliptin-Metformin.    Patient is here for follow-up of type 2 diabetes mellitus.  Current symptoms/problems include none.  She is adherent with her medication.      Current monitoring regimen: checking once a day  Home blood sugar records: 115-145  Any episodes of hypoglycemia? no      Known diabetic complications: none  Cardiovascular risk factors: diabetes mellitus, obesity (BMI >= 30 kg/m2) and smoking/ tobacco exposure  Current diabetic medications include oral agent (monotherapy): Alogliptin-Metformin      Eye exam current (within one year): yes, will request records  Weight trend: stable  Current diet: has been trying to eat healthy, cutting back on sodas and limiting her carb intake  Current exercise: walking 2-3 times per week      She is on an ACE inhibitor or angiotensin II receptor blocker: no  She is on a statin: no  She is on Aspirin: no  Has cut back on smoking, down from 1/2 PPD to 3-10 cigarettes per day.     Patient is due for labs.    Review of Systems   Constitutional: Negative for chills and fever.   Respiratory: Negative for cough, shortness of breath and wheezing.    Cardiovascular: Negative for chest pain and palpitations.   Gastrointestinal: Negative for abdominal pain, constipation, diarrhea, nausea and vomiting.   Neurological: Negative for dizziness, syncope and headaches.     The following portions of the patient's history were reviewed and updated as appropriate: current medications, past medical history, past social history and problem list.    Objective   /80  Resp 18  Ht 62.25\" (158.1 cm)  Wt 209 lb (94.8 kg)  BMI 37.92 kg/m2     Physical Exam   Constitutional: She is oriented to person, place, and time. She appears " well-developed and well-nourished.   No acute distress.   HENT:   Head: Normocephalic and atraumatic.   Eyes: Conjunctivae and EOM are normal.   Neck: Normal range of motion.   Cardiovascular: Normal rate, regular rhythm, normal heart sounds and intact distal pulses.    Pulmonary/Chest: Effort normal and breath sounds normal. She has no wheezes.   Abdominal: Soft. Bowel sounds are normal. There is no tenderness.   Musculoskeletal: Normal range of motion.   Moves all extremities.   Neurological: She is alert and oriented to person, place, and time.   Skin: Skin is warm and dry.   Psychiatric: She has a normal mood and affect. Her behavior is normal.   Vitals reviewed.      Assessment/Plan   Yasmin was seen today for diabetes.    Diagnoses and all orders for this visit:    Type 2 diabetes mellitus without complication, without long-term current use of insulin  -     Comprehensive Metabolic Panel  -     Lipid Panel  -     POC Glycosylated Hemoglobin (Hb A1C)    The above labs were ordered today.  Will adjust current regimen if labs indicate.    Tobacco abuse    Encouraged smoking cessation today.    Need for immunization against influenza  -     Flu Vaccine Quad PF >18YR

## 2017-12-04 NOTE — PATIENT INSTRUCTIONS
Please go to the lab before you leave to have your labs drawn.  You will be called or receive a letter with your results.  Please continue taking your current medications as directed.  Continue to exercise regularly, make healthy diet choices and cut back on your smoking.  Recommend that you go to your pharmacy to get your Tdap vaccine.  Please follow-up as indicated.  Return to the clinic sooner if your blood sugars worsen or if you have any other concerns.

## 2017-12-06 ENCOUNTER — TELEPHONE (OUTPATIENT)
Dept: INTERNAL MEDICINE | Facility: CLINIC | Age: 32
End: 2017-12-06

## 2017-12-06 NOTE — TELEPHONE ENCOUNTER
----- Message from Irena Lowery MD sent at 12/5/2017  2:31 PM EST -----  Please call the patient regarding her recent lab results.  Her Hgb A1C has improved to 6.8, meaning that her diabetes control is at goal.  We will continue her current medication regimen.  Her cholesterol has also improved since we last checked labs.  Recommend that she continue implementing her diet changes because they are helping.  Thanks.

## 2018-01-18 ENCOUNTER — OFFICE VISIT (OUTPATIENT)
Dept: OBSTETRICS AND GYNECOLOGY | Facility: CLINIC | Age: 33
End: 2018-01-18

## 2018-01-18 VITALS
DIASTOLIC BLOOD PRESSURE: 80 MMHG | SYSTOLIC BLOOD PRESSURE: 126 MMHG | WEIGHT: 219 LBS | BODY MASS INDEX: 39.73 KG/M2 | RESPIRATION RATE: 16 BRPM

## 2018-01-18 DIAGNOSIS — N63.0 BREAST LUMP: Primary | ICD-10-CM

## 2018-01-18 DIAGNOSIS — N92.6 IRREGULAR MENSES: ICD-10-CM

## 2018-01-18 PROCEDURE — 99213 OFFICE O/P EST LOW 20 MIN: CPT | Performed by: OBSTETRICS & GYNECOLOGY

## 2018-01-18 NOTE — PROGRESS NOTES
Subjective   Chief Complaint   Patient presents with   • Follow-up     left armpit lump     Yasmin Singleton is a 32 y.o. year old  presenting to be seen because ofA new breast lump in the left axilla.  She had one on the right side but eventually resolved.  Her breasts are also getting tender in general.    She is on Clomid but taken to maybe 2 cycles have not yet checked her urine ovulation kits.  Last period started about 2 days ago Unclear when exactly this was related to her her Clomid but maybe about 30 days.  Discussed it may be ovulating with cyclic breast tenderness and shoulder however she didn't progesterone due to increased right before her period.  Her caffeine intake is minimal.        The following portions of the patient's history were reviewed and updated as appropriate:current medications and allergies    Review of Systems      Objective   /80  Resp 16  Wt 99.3 kg (219 lb)  LMP 2018  BMI 39.73 kg/m2    General:  well developed; well nourished  no acute distress  appears stated age   Breasts:  Examined in supine position  Symmetric without masses or skin dimpling  Nipples normal without inversion, lesions or discharge  There are no palpable axillary nodes  There is a small superficial BB size firm lesion in the inferior aspect of the mid left axilla this is definitely skin associated due to its superficial she hour T     Lab Review   No data reviewed    Imaging   No data reviewed       Assessment   1. Superficial skin sebaceous cyst left axilla breast exam is benign no nodes palpated in left axilla   2. Irregular menses on Clomid unsure whether she is ovulating      Plan   1. Reassurance that this is a skin lesion left me know if he gets any larger we can remove destroy or biopsy it.  2. Take Clomid days 3 through 7.  Today is day 3 check urine ovulation kits  to see if she is ovulating on this dose if not limiting no will bump that up to 100 mg daily cycle days 3 through  7    Medications Rx this encounter:  No orders of the defined types were placed in this encounter.         This note was electronically signed.    Segundo Solano MD  January 18, 2018

## 2018-02-07 ENCOUNTER — TELEPHONE (OUTPATIENT)
Dept: INTERNAL MEDICINE | Facility: CLINIC | Age: 33
End: 2018-02-07

## 2018-02-07 DIAGNOSIS — E11.9 TYPE 2 DIABETES MELLITUS WITHOUT COMPLICATION, WITHOUT LONG-TERM CURRENT USE OF INSULIN (HCC): ICD-10-CM

## 2018-02-07 RX ORDER — ALOGLIPTIN AND METFORMIN HYDROCHLORIDE 12.5; 5 MG/1; MG/1
1 TABLET, FILM COATED ORAL 2 TIMES DAILY
Qty: 60 TABLET | Refills: 5 | Status: SHIPPED | OUTPATIENT
Start: 2018-02-07 | End: 2018-07-31 | Stop reason: SDUPTHER

## 2018-03-30 ENCOUNTER — OFFICE VISIT (OUTPATIENT)
Dept: OBSTETRICS AND GYNECOLOGY | Facility: CLINIC | Age: 33
End: 2018-03-30

## 2018-03-30 VITALS
SYSTOLIC BLOOD PRESSURE: 124 MMHG | WEIGHT: 225 LBS | RESPIRATION RATE: 16 BRPM | DIASTOLIC BLOOD PRESSURE: 80 MMHG | BODY MASS INDEX: 40.82 KG/M2

## 2018-03-30 DIAGNOSIS — N92.6 IRREGULAR MENSES: Primary | ICD-10-CM

## 2018-03-30 PROCEDURE — 99213 OFFICE O/P EST LOW 20 MIN: CPT | Performed by: OBSTETRICS & GYNECOLOGY

## 2018-03-30 NOTE — PROGRESS NOTES
Subjective   Chief Complaint   Patient presents with   • Follow-up     on clomid     Yasmin Singleton is a 32 y.o. year old  presenting to be seen with complaints of trouble with her menses.   Current birth control method: none.She had 1 day slightly 12 the first month she used Clomid whether was 2 lines indicating ovulation but nothing the next day.  Did not have a period until 18 days later.  So I'm not sure if that really was ovulation.  Also the next month no line change at all.  Discussed that we probably about a bump that up to 100 mg and see how she does with that dosage.    The last time she recalls having predictable regular cycles was last few months.      · Additional notable symptoms: night sweats  · Changes in weight: weight has been stable up and down up few pounds discussed watching carbohydrates in particular the 4 PEs  · Recent increase in stress? no  · Is there associated pain ? no    Past 6 month menstrual and contraceptive history:    Patient's last menstrual period was 2018.  Cycle Frequency: regular, predictable and consistent every 28 - 32 days   Menstrual cycle character: flow is typically normal and flow is typically moderately heavy   Cycle Duration: 5 - 6   Number of heavy days of flows: 3   Intermenstrual bleeding present: no   Post-coital bleeding present: no     She is sexually active.  In the past 12 months there has not been new sexual partners.  Condoms are not typically used.  She would not like to be screened for STD's at today's exam.     The following portions of the patient's history were reviewed and updated as appropriate:problem list, current medications and allergies    Review of Systems      Objective   /80   Resp 16   Wt 102 kg (225 lb)   LMP 2018   BMI 40.82 kg/m²     General:  well developed; well nourished  no acute distress   Skin:  No suspicious lesions seen   Thyroid: normal to inspection and palpation   Lungs:  breathing is unlabored    Heart:  Not performed.   Abdomen: Not performed.   Pelvis: Clinical staff was present for exam     Lab Review   Hemoglobin A1c etc. last year    Imaging   No data reviewed       Assessment   1. Irregular menses and ovulation.  She did not get fasting insulin and glucose done as ordered in 2017.  Hemoglobin A1c is high some curious to see what her fasting insulin level is.  I've asked her to do this next week.  2.  We'll also increase Clomid 100 mg days 3 through 7  3.      Plan   1. As above we'll follow up in about 4 months.  2.   She will work on weight loss    Medications Rx this encounter:  New Medications Ordered This Visit   Medications   • clomiPHENE (CLOMID) 50 MG tablet     Sig: Take 1 tablet by mouth 2 (Two) Times a Day. 1 po cycle days 3-7     Dispense:  10 tablet     Refill:  3          This note was electronically signed.    Segundo Solano MD  March 30, 2018

## 2018-04-02 ENCOUNTER — RESULTS ENCOUNTER (OUTPATIENT)
Dept: OBSTETRICS AND GYNECOLOGY | Facility: CLINIC | Age: 33
End: 2018-04-02

## 2018-04-02 DIAGNOSIS — N92.6 IRREGULAR MENSES: ICD-10-CM

## 2018-07-02 ENCOUNTER — TELEPHONE (OUTPATIENT)
Dept: INTERNAL MEDICINE | Facility: CLINIC | Age: 33
End: 2018-07-02

## 2018-07-02 RX ORDER — LANCETS
EACH MISCELLANEOUS
Qty: 100 EACH | Refills: 12 | Status: SHIPPED | OUTPATIENT
Start: 2018-07-02 | End: 2018-10-24

## 2018-07-02 RX ORDER — BLOOD-GLUCOSE METER
KIT MISCELLANEOUS
Qty: 50 EACH | Refills: 11 | Status: SHIPPED | OUTPATIENT
Start: 2018-07-02 | End: 2018-07-02 | Stop reason: CLARIF

## 2018-07-31 ENCOUNTER — OFFICE VISIT (OUTPATIENT)
Dept: OBSTETRICS AND GYNECOLOGY | Facility: CLINIC | Age: 33
End: 2018-07-31

## 2018-07-31 VITALS
SYSTOLIC BLOOD PRESSURE: 116 MMHG | WEIGHT: 223 LBS | DIASTOLIC BLOOD PRESSURE: 70 MMHG | BODY MASS INDEX: 40.46 KG/M2 | RESPIRATION RATE: 16 BRPM

## 2018-07-31 DIAGNOSIS — L68.0 HIRSUTISM: ICD-10-CM

## 2018-07-31 DIAGNOSIS — N92.6 IRREGULAR MENSES: Primary | ICD-10-CM

## 2018-07-31 DIAGNOSIS — E11.9 TYPE 2 DIABETES MELLITUS WITHOUT COMPLICATION, WITHOUT LONG-TERM CURRENT USE OF INSULIN (HCC): ICD-10-CM

## 2018-07-31 PROCEDURE — 99213 OFFICE O/P EST LOW 20 MIN: CPT | Performed by: OBSTETRICS & GYNECOLOGY

## 2018-07-31 RX ORDER — ALOGLIPTIN AND METFORMIN HYDROCHLORIDE 12.5; 5 MG/1; MG/1
1 TABLET, FILM COATED ORAL 2 TIMES DAILY
Qty: 60 TABLET | Refills: 5 | Status: SHIPPED | OUTPATIENT
Start: 2018-07-31 | End: 2018-09-12 | Stop reason: SDUPTHER

## 2018-07-31 NOTE — PROGRESS NOTES
Subjective   Chief Complaint   Patient presents with   • Follow-up     trying for pregnancy     Yasmin Singleton is a 32 y.o. year old .  Patient's last menstrual period was 2018.  She presents to be seen because of irregular menses, intercourse every 2-3 days except with ovulation. She hasnt had labs drawn yet.  Sugars are run high at ~ 180 2 hours post prandial - needs to be tighter ideally for pregnancy to reduce risk of problems. She is hit or miss with her diet - hungry vs no appetite. Weight at home 215 2 weeks ago - I would her to check once weeklly in am after voiding.    Patient's last menstrual period was 2018. prior    Cycle Frequency: irregular   Menstrual cycle character: flow is typically normal and flow is typically moderately heavy   Cycle Duration: 4 - 5; first 2 days heavy                   The following portions of the patient's history were reviewed and updated as appropriate:problem list, current medications and allergies    Review of Systems bladder and bowels are normal      Objective   /70   Resp 16   Wt 101 kg (223 lb)   LMP 2018   BMI 40.46 kg/m²     General:  well developed; well nourished  no acute distress  appears stated age   Skin:  No suspicious lesions seen   She has hair growth on her chin    Thyroid: normal to inspection and palpation   Lungs:  breathing is unlabored   Heart:  Not performed.   Abdomen: soft, non-tender; no masses  no umbilical or inginual hernias are present  no hepato-splenomegaly   Pelvis: Not performed.     Lab Review   No data reviewed she needs to get labs drawn    Imaging   Pelvic ultrasound report 2017       Assessment   1. Irregular menses and anovulation? Hirsutism   2. Type 2 DM      Plan   1. We need to check labs and get her sugar in better control; weight loss may help ovulation  2. Follow 1800 calorie and although active at work adding 30+ minutes brisk walk     Medications Rx this encounter:  No orders of the  defined types were placed in this encounter.         This note was electronically signed.    Segundo Solano MD  July 31, 2018

## 2018-08-01 ENCOUNTER — RESULTS ENCOUNTER (OUTPATIENT)
Dept: OBSTETRICS AND GYNECOLOGY | Facility: CLINIC | Age: 33
End: 2018-08-01

## 2018-08-01 DIAGNOSIS — N92.6 IRREGULAR MENSES: ICD-10-CM

## 2018-08-01 DIAGNOSIS — L68.0 HIRSUTISM: ICD-10-CM

## 2018-08-02 ENCOUNTER — LAB REQUISITION (OUTPATIENT)
Dept: LAB | Facility: HOSPITAL | Age: 33
End: 2018-08-02

## 2018-08-02 DIAGNOSIS — Z00.00 ROUTINE GENERAL MEDICAL EXAMINATION AT A HEALTH CARE FACILITY: ICD-10-CM

## 2018-08-02 DIAGNOSIS — N92.6 IRREGULAR MENSTRUATION: ICD-10-CM

## 2018-08-03 LAB
DHEA-S SERPL-MCNC: 213.9 UG/DL (ref 84.8–378)
GLUCOSE SERPL-MCNC: 185 MG/DL (ref 65–99)
INSULIN SERPL-ACNC: 15.3 UIU/ML (ref 2.6–24.9)
PROLACTIN SERPL-MCNC: 13 NG/ML (ref 4.8–23.3)
TESTOST SERPL-MCNC: 29 NG/DL (ref 8–48)
TSH SERPL DL<=0.005 MIU/L-ACNC: 4.96 UIU/ML (ref 0.45–4.5)

## 2018-08-04 PROBLEM — E03.9 HYPOTHYROID: Status: ACTIVE | Noted: 2018-08-04

## 2018-08-04 RX ORDER — LEVOTHYROXINE SODIUM 0.05 MG/1
50 TABLET ORAL DAILY
Qty: 30 TABLET | Refills: 5 | Status: SHIPPED | OUTPATIENT
Start: 2018-08-04 | End: 2019-02-04 | Stop reason: SDUPTHER

## 2018-08-04 NOTE — PROGRESS NOTES
Please her know that her thyroid is not flushing as well as we will like it.  I've called in Synthroid for her to take once a day.  This may help with ovulation.  Her other labs or normal with the exception that her sugar is still high 185.  She needs to work on getting her sugar under better control preconception.  If she would like to see diabetes educators etc. please let us know.  I think that would be a good idea.  Thank you

## 2018-08-06 ENCOUNTER — TELEPHONE (OUTPATIENT)
Dept: OBSTETRICS AND GYNECOLOGY | Facility: CLINIC | Age: 33
End: 2018-08-06

## 2018-08-13 RX ORDER — LANCETS 28 GAUGE
EACH MISCELLANEOUS
Qty: 100 EACH | Refills: 2 | Status: SHIPPED | OUTPATIENT
Start: 2018-08-13 | End: 2018-10-24 | Stop reason: SDUPTHER

## 2018-09-12 ENCOUNTER — TELEPHONE (OUTPATIENT)
Dept: INTERNAL MEDICINE | Facility: CLINIC | Age: 33
End: 2018-09-12

## 2018-09-12 DIAGNOSIS — E11.9 TYPE 2 DIABETES MELLITUS WITHOUT COMPLICATION, WITHOUT LONG-TERM CURRENT USE OF INSULIN (HCC): ICD-10-CM

## 2018-09-12 RX ORDER — ALOGLIPTIN AND METFORMIN HYDROCHLORIDE 12.5; 5 MG/1; MG/1
1 TABLET, FILM COATED ORAL 2 TIMES DAILY
Qty: 60 TABLET | Refills: 0 | Status: SHIPPED | OUTPATIENT
Start: 2018-09-12 | End: 2018-10-16 | Stop reason: SDUPTHER

## 2018-10-12 ENCOUNTER — TELEPHONE (OUTPATIENT)
Dept: INTERNAL MEDICINE | Facility: CLINIC | Age: 33
End: 2018-10-12

## 2018-10-16 DIAGNOSIS — E11.9 TYPE 2 DIABETES MELLITUS WITHOUT COMPLICATION, WITHOUT LONG-TERM CURRENT USE OF INSULIN (HCC): ICD-10-CM

## 2018-10-16 RX ORDER — ALOGLIPTIN AND METFORMIN HYDROCHLORIDE 12.5; 5 MG/1; MG/1
1 TABLET, FILM COATED ORAL 2 TIMES DAILY
Qty: 60 TABLET | Refills: 0 | Status: SHIPPED | OUTPATIENT
Start: 2018-10-16 | End: 2018-10-24

## 2018-10-24 ENCOUNTER — OFFICE VISIT (OUTPATIENT)
Dept: INTERNAL MEDICINE | Facility: CLINIC | Age: 33
End: 2018-10-24

## 2018-10-24 VITALS
HEIGHT: 62 IN | OXYGEN SATURATION: 100 % | WEIGHT: 219 LBS | SYSTOLIC BLOOD PRESSURE: 125 MMHG | DIASTOLIC BLOOD PRESSURE: 82 MMHG | RESPIRATION RATE: 16 BRPM | BODY MASS INDEX: 40.3 KG/M2 | HEART RATE: 87 BPM

## 2018-10-24 DIAGNOSIS — E28.2 PCOS (POLYCYSTIC OVARIAN SYNDROME): ICD-10-CM

## 2018-10-24 DIAGNOSIS — Z23 NEED FOR INFLUENZA VACCINATION: ICD-10-CM

## 2018-10-24 DIAGNOSIS — E03.9 HYPOTHYROIDISM, UNSPECIFIED TYPE: ICD-10-CM

## 2018-10-24 DIAGNOSIS — E11.9 TYPE 2 DIABETES MELLITUS WITHOUT COMPLICATION, WITHOUT LONG-TERM CURRENT USE OF INSULIN (HCC): Primary | ICD-10-CM

## 2018-10-24 DIAGNOSIS — Z23 NEED FOR PNEUMOCOCCAL VACCINATION: ICD-10-CM

## 2018-10-24 PROCEDURE — 90732 PPSV23 VACC 2 YRS+ SUBQ/IM: CPT | Performed by: NURSE PRACTITIONER

## 2018-10-24 PROCEDURE — 99214 OFFICE O/P EST MOD 30 MIN: CPT | Performed by: NURSE PRACTITIONER

## 2018-10-24 PROCEDURE — 83036 HEMOGLOBIN GLYCOSYLATED A1C: CPT | Performed by: NURSE PRACTITIONER

## 2018-10-24 PROCEDURE — 90471 IMMUNIZATION ADMIN: CPT | Performed by: NURSE PRACTITIONER

## 2018-10-24 PROCEDURE — 90686 IIV4 VACC NO PRSV 0.5 ML IM: CPT | Performed by: NURSE PRACTITIONER

## 2018-10-24 PROCEDURE — 90472 IMMUNIZATION ADMIN EACH ADD: CPT | Performed by: NURSE PRACTITIONER

## 2018-10-24 RX ORDER — ALOGLIPTIN AND METFORMIN HYDROCHLORIDE 12.5; 1 MG/1; MG/1
1 TABLET, FILM COATED ORAL 2 TIMES DAILY
Qty: 60 TABLET | Refills: 2 | Status: SHIPPED | OUTPATIENT
Start: 2018-10-24 | End: 2019-01-30 | Stop reason: SDUPTHER

## 2018-10-24 RX ORDER — LANCETS 28 GAUGE
1 EACH MISCELLANEOUS DAILY
Qty: 100 EACH | Refills: 11 | Status: SHIPPED | OUTPATIENT
Start: 2018-10-24 | End: 2020-01-23

## 2018-10-24 NOTE — PROGRESS NOTES
Subjective   Yasmin Singleton is a 33 y.o. female here today for FU on chronic conditions.    Chief Complaint   Patient presents with   • Diabetes   • Med Refill     DM:  It has been a year since Yasmin's last DM check up.  Last visit she she was well controlled on her medication and was 6.8.  She does report that she is not always good at taking her medication regularly and is on and off her diet.  She also has gotten bad about checking her glucose.  When she has checked it, it has ranged from .  Her BP is controlled.  Statin and Ace not appropriate as she is child bearing and wishes to conceive soon. She has previously seen a nutritionist for DM diet education.  She is UTD on diabetic eye exam.  She has never had a DM foot exam.  Needs FLU and Pneumovax today.      PCOS/ infertility:  Was seeing Dr. Solano- he has left the practice.  Needs new OB referral for fertility issues and PCOS.  Compliant with prenatal vitamin. Menstrual cycles mostly normal- not ovulating.  Has tried clomide without positive results.     Hypothyroidism:  Reports she is compliant with her medication.  Denies SA.  Reports he weight is up and down and she does have intolerance to heat as well as fatigue.      Review of Systems   Constitutional: Positive for fatigue and unexpected weight gain. Negative for activity change, appetite change and unexpected weight loss.   Eyes: Negative for blurred vision and double vision.   Respiratory: Negative for chest tightness and shortness of breath.    Cardiovascular: Negative for chest pain and leg swelling.   Gastrointestinal: Negative for diarrhea, nausea and vomiting.   Endocrine: Positive for heat intolerance and polyphagia. Negative for cold intolerance, polydipsia and polyuria.   Genitourinary: Positive for menstrual problem.   Skin: Negative for rash and skin lesions.   Neurological: Negative for dizziness, seizures, syncope, facial asymmetry, weakness, light-headedness, headache, memory problem  and confusion.       Past Medical History:   Diagnosis Date   • Diabetes (CMS/HCC)    • GERD (gastroesophageal reflux disease)    • History of recurrent UTIs    • Seasonal allergies      Family History   Problem Relation Age of Onset   • Cancer Father         Unsure what kind   • Hypertension Father    • Stroke Father    • Kidney failure Mother      Past Surgical History:   Procedure Laterality Date   • WISDOM TOOTH EXTRACTION  2015     Social History     Social History   • Marital status: Single     Spouse name: N/A   • Number of children: N/A   • Years of education: N/A     Occupational History   • Not on file.     Social History Main Topics   • Smoking status: Current Every Day Smoker     Packs/day: 0.50     Years: 12.00     Types: Cigarettes   • Smokeless tobacco: Never Used   • Alcohol use Yes      Comment: on occasion, at most 1-2 drinks a month   • Drug use: Yes     Frequency: 4.0 times per week     Types: Marijuana   • Sexual activity: Yes     Partners: Male     Birth control/ protection: None     Other Topics Concern   • Not on file     Social History Narrative   • No narrative on file         Current Outpatient Prescriptions:   •  glucose blood (FREESTYLE LITE) test strip, 1 each by Other route 2 (Two) Times a Day. Use as instructed, Disp: 100 each, Rfl: 12  •  Lancets (FREESTYLE) lancets, 1 each by Other route Daily. Use as instructed, Disp: 100 each, Rfl: 11  •  levothyroxine (SYNTHROID) 50 MCG tablet, Take 1 tablet by mouth Daily., Disp: 30 tablet, Rfl: 5  •  Alogliptin-Metformin HCl 12.5-1000 MG tablet, Take 1 tablet by mouth 2 (Two) Times a Day., Disp: 60 tablet, Rfl: 2    Current Facility-Administered Medications:   •  pneumococcal polysaccharide 23-valent (PNEUMOVAX-23) vaccine 0.5 mL, 0.5 mL, Intramuscular, During Hospitalization, Anderson Hathaway MD    Objective   Vitals:    10/24/18 1047 10/24/18 1100   BP: 120/90 125/82   Pulse: 87    Resp: 16    SpO2: 100%    Weight: 99.3 kg (219 lb)   "  Height: 158.1 cm (62.25\")      Body mass index is 39.73 kg/m².  Physical Exam   Constitutional: She is oriented to person, place, and time. She appears well-developed and well-nourished. No distress.   Eyes: Pupils are equal, round, and reactive to light.   Neck: Neck supple. No thyromegaly present.   Cardiovascular: Normal rate and regular rhythm.    Pulmonary/Chest: Effort normal and breath sounds normal.   Neurological: She is alert and oriented to person, place, and time.   Skin: Skin is warm and dry. Capillary refill takes 2 to 3 seconds. She is not diaphoretic.   Psychiatric: She has a normal mood and affect. Her behavior is normal. Judgment and thought content normal.   Nursing note and vitals reviewed.      Assessment/Plan   Problem List Items Addressed This Visit     Type 2 diabetes mellitus without complication, without long-term current use of insulin (CMS/[facility]) - Primary    Relevant Medications    Alogliptin-Metformin HCl 12.5-1000 MG tablet    Lancets (FREESTYLE) lancets    glucose blood (FREESTYLE LITE) test strip    Other Relevant Orders    Comprehensive Metabolic Panel    CBC & Differential    Ambulatory Referral for Diabetic Eye Exam-Ophthalmology    Ambulatory Referral to Podiatry    PCOS (polycystic ovarian syndrome)    Relevant Medications    Alogliptin-Metformin HCl 12.5-1000 MG tablet    Other Relevant Orders    Ambulatory Referral to Obstetrics / Gynecology    Hypothyroid-TSH 4.9 August 2018    Relevant Medications    levothyroxine (SYNTHROID) 50 MCG tablet    Other Relevant Orders    TSH      Other Visit Diagnoses     Need for influenza vaccination        Relevant Orders    Fluarix/Fluzone/Afluria Quad/FluLaval Quad    Need for pneumococcal vaccination        Relevant Medications    pneumococcal polysaccharide 23-valent (PNEUMOVAX-23) vaccine 0.5 mL        Yasmin was seen today for diabetes and med refill.    Diagnoses and all orders for this visit:    Type 2 diabetes mellitus without " complication, without long-term current use of insulin (CMS/[facility])        -   Alogliptin-Metformin HCl 12.5-1000 MG tablet; Take 1 tablet by mouth 2 (Two) Times a Day.  -     Lancets (FREESTYLE) lancets; 1 each by Other route Daily. Use as instructed  -     glucose blood (FREESTYLE LITE) test strip; 1 each by Other route 2 (Two) Times a Day. Use as instructed  -     Comprehensive Metabolic Panel  -     CBC & Differential  -     Ambulatory Referral for Diabetic Eye Exam-Ophthalmology  -     Ambulatory Referral to Podiatry         -    Long discussion regarding appropriate dietary choices and importance of being compliant with her medications.        -   She is to begin checking her blood sugar and notify me if she is still in the 200s        -    Medication increased today  PCOS (polycystic ovarian syndrome)  -     Alogliptin-Metformin HCl 12.5-1000 MG tablet; Take 1 tablet by mouth 2 (Two) Times a Day.  -     Ambulatory Referral to Obstetrics / Gynecology        -    Increasing her metformin may help with her PCO S symptoms.  She may have an increase in fertility if her diabetes is better controlled  Hypothyroidism, unspecified type  -     TSH      -       Given her symptoms she may not be therapeutic.  I will hold off on refilling her medication until I have her TSH  Need for influenza vaccination  -     Fluarix/Fluzone/Afluria Quad/FluLaval Quad    Need for pneumococcal vaccination  -     pneumococcal polysaccharide 23-valent (PNEUMOVAX-23) vaccine 0.5 mL; Inject 0.5 mL into the appropriate muscle as directed by prescriber During Hospitalization for Immunization.             The patient was counseled regarding diagnostic results, impressions, prognosis, instructions for management, risk factor reductions, education, and importance of treatment compliance.  The patient verbalized understanding of and agreement with the plan of care.    Advised patient to call with any further questions and any new or worsening  symptoms.     Return for Physical w/Next Visit, Labs Today, DM FU in 3 mo.      Yasmin Hanna, APRN

## 2018-10-26 NOTE — PROGRESS NOTES
I have reviewed the notes, assessments, and/or procedures performed by Yasmin Hanna, I concur with her/his documentation of Yasmin Singleton.

## 2018-10-29 LAB — HBA1C MFR BLD: 8.7 %

## 2019-01-30 ENCOUNTER — OFFICE VISIT (OUTPATIENT)
Dept: INTERNAL MEDICINE | Facility: CLINIC | Age: 34
End: 2019-01-30

## 2019-01-30 ENCOUNTER — HOSPITAL ENCOUNTER (EMERGENCY)
Facility: HOSPITAL | Age: 34
Discharge: HOME | End: 2019-01-30
Attending: EMERGENCY MEDICINE | Admitting: EMERGENCY MEDICINE

## 2019-01-30 VITALS
OXYGEN SATURATION: 97 % | BODY MASS INDEX: 40.67 KG/M2 | HEART RATE: 87 BPM | TEMPERATURE: 98.4 F | SYSTOLIC BLOOD PRESSURE: 128 MMHG | WEIGHT: 221 LBS | DIASTOLIC BLOOD PRESSURE: 80 MMHG | RESPIRATION RATE: 16 BRPM | HEIGHT: 62 IN

## 2019-01-30 VITALS
WEIGHT: 221 LBS | RESPIRATION RATE: 16 BRPM | BODY MASS INDEX: 40.67 KG/M2 | HEART RATE: 97 BPM | DIASTOLIC BLOOD PRESSURE: 70 MMHG | OXYGEN SATURATION: 99 % | SYSTOLIC BLOOD PRESSURE: 116 MMHG | HEIGHT: 62 IN

## 2019-01-30 DIAGNOSIS — R07.9 NONSPECIFIC CHEST PAIN: Primary | ICD-10-CM

## 2019-01-30 DIAGNOSIS — E03.8 OTHER SPECIFIED HYPOTHYROIDISM: ICD-10-CM

## 2019-01-30 DIAGNOSIS — E28.2 PCOS (POLYCYSTIC OVARIAN SYNDROME): ICD-10-CM

## 2019-01-30 DIAGNOSIS — R07.89 OTHER CHEST PAIN: ICD-10-CM

## 2019-01-30 DIAGNOSIS — G62.9 NEUROPATHY: Primary | ICD-10-CM

## 2019-01-30 DIAGNOSIS — E11.9 TYPE 2 DIABETES MELLITUS WITHOUT COMPLICATION, WITHOUT LONG-TERM CURRENT USE OF INSULIN (HCC): Primary | ICD-10-CM

## 2019-01-30 DIAGNOSIS — K21.9 GASTROESOPHAGEAL REFLUX DISEASE WITHOUT ESOPHAGITIS: ICD-10-CM

## 2019-01-30 DIAGNOSIS — G62.9 NEUROPATHY: ICD-10-CM

## 2019-01-30 LAB — HBA1C MFR BLD: 9.6 %

## 2019-01-30 PROCEDURE — 99283 EMERGENCY DEPT VISIT LOW MDM: CPT

## 2019-01-30 PROCEDURE — 93005 ELECTROCARDIOGRAM TRACING: CPT | Performed by: EMERGENCY MEDICINE

## 2019-01-30 PROCEDURE — 83036 HEMOGLOBIN GLYCOSYLATED A1C: CPT | Performed by: NURSE PRACTITIONER

## 2019-01-30 PROCEDURE — 99215 OFFICE O/P EST HI 40 MIN: CPT | Performed by: NURSE PRACTITIONER

## 2019-01-30 PROCEDURE — 93000 ELECTROCARDIOGRAM COMPLETE: CPT | Performed by: NURSE PRACTITIONER

## 2019-01-30 PROCEDURE — 94770: CPT

## 2019-01-30 RX ORDER — SODIUM CHLORIDE 0.9 % (FLUSH) 0.9 %
10 SYRINGE (ML) INJECTION AS NEEDED
Status: DISCONTINUED | OUTPATIENT
Start: 2019-01-30 | End: 2019-01-30

## 2019-01-30 RX ORDER — PANTOPRAZOLE SODIUM 40 MG/1
40 TABLET, DELAYED RELEASE ORAL DAILY
Qty: 30 TABLET | Refills: 2 | Status: SHIPPED | OUTPATIENT
Start: 2019-01-30 | End: 2019-05-06

## 2019-01-30 RX ORDER — ALOGLIPTIN AND METFORMIN HYDROCHLORIDE 12.5; 1 MG/1; MG/1
1 TABLET, FILM COATED ORAL 2 TIMES DAILY
Qty: 60 TABLET | Refills: 2 | Status: SHIPPED | OUTPATIENT
Start: 2019-01-30 | End: 2019-03-15 | Stop reason: SDUPTHER

## 2019-01-30 NOTE — PROGRESS NOTES
Subjective   Yasmin Singleton is a 33 y.o. female here today for DM/ PCOS/  hypothyroidism.    Chief Complaint   Patient presents with   • Diabetes   • Med Refill     Yasmin is here today for FU on DM.  Last visit her A1C was 8.7 and her medication was increased.  BP at goal of <130/80. She is of child bearing age- trying to conceive and not appropriate for a statin. ACE not indicated at this time.  She reports her blood sugars are still in the 170-230's.  She denies any hypoglycemia.  She checks her blood sugar twice a day.  She is not following a diabetic diet.  Breads and pastas are her weaknesses- she has been to diabetic education classes.  Still having diarrhea from metformin.    She reports pressure in her epigastric region that makes her want to vomit. This has been happening for months. Sometimes she actually vomits.  It is a nauseous feeling.  She has never tried anything for this.      She has also been having a sharp chest pain.  This pain has been going on for about a week.  It comes a goes- is sharp- no SOA with this.  She is not anxious at the time.  She denies worsening heartburn at this time.       She also reports numbness and tingling of both fingers- mostly the right.  She reports that this has been going on for years but is getting worse.  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      Review of Systems   Constitutional: Positive for unexpected weight gain. Negative for activity change, appetite change, fatigue and unexpected weight loss.   Eyes: Negative for blurred vision and double vision.   Respiratory: Negative for chest tightness and shortness of breath.    Cardiovascular: Positive for chest pain. Negative for leg swelling.   Gastrointestinal: Positive for abdominal pain, diarrhea and indigestion. Negative for nausea and vomiting.   Endocrine: Negative for cold intolerance, heat intolerance, polydipsia,  polyphagia and polyuria.   Skin: Negative for rash and skin lesions.   Neurological: Positive for numbness. Negative for dizziness, seizures, syncope, facial asymmetry, weakness, light-headedness, headache, memory problem and confusion.       Past Medical History:   Diagnosis Date   • Diabetes (CMS/HCC)    • GERD (gastroesophageal reflux disease)    • History of recurrent UTIs    • Seasonal allergies      Family History   Problem Relation Age of Onset   • Cancer Father         Unsure what kind   • Hypertension Father    • Stroke Father    • Kidney failure Mother      Past Surgical History:   Procedure Laterality Date   • WISDOM TOOTH EXTRACTION  2015     Social History     Socioeconomic History   • Marital status: Single     Spouse name: Not on file   • Number of children: Not on file   • Years of education: Not on file   • Highest education level: Not on file   Social Needs   • Financial resource strain: Not on file   • Food insecurity - worry: Not on file   • Food insecurity - inability: Not on file   • Transportation needs - medical: Not on file   • Transportation needs - non-medical: Not on file   Occupational History   • Not on file   Tobacco Use   • Smoking status: Current Every Day Smoker     Packs/day: 0.50     Years: 12.00     Pack years: 6.00     Types: Cigarettes   • Smokeless tobacco: Never Used   Substance and Sexual Activity   • Alcohol use: Yes     Comment: on occasion, at most 1-2 drinks a month   • Drug use: Yes     Frequency: 4.0 times per week     Types: Marijuana   • Sexual activity: Yes     Partners: Male     Birth control/protection: None   Other Topics Concern   • Not on file   Social History Narrative   • Not on file         Current Outpatient Medications:   •  Alogliptin-Metformin HCl 12.5-1000 MG tablet, Take 1 tablet by mouth 2 (Two) Times a Day., Disp: 60 tablet, Rfl: 2  •  glucose blood (FREESTYLE LITE) test strip, 1 each by Other route 2 (Two) Times a Day. Use as instructed, Disp: 100  "each, Rfl: 12  •  Lancets (FREESTYLE) lancets, 1 each by Other route Daily. Use as instructed, Disp: 100 each, Rfl: 11  •  levothyroxine (SYNTHROID) 50 MCG tablet, Take 1 tablet by mouth Daily., Disp: 30 tablet, Rfl: 5  •  Liraglutide (VICTOZA) 18 MG/3ML solution pen-injector injection, 0.6 mg daily for week 1, then 1.2 mg daily, Disp: 3 mL, Rfl: 5  •  pantoprazole (PROTONIX) 40 MG EC tablet, Take 1 tablet by mouth Daily., Disp: 30 tablet, Rfl: 2    Objective   Vitals:    01/30/19 1436   BP: 116/70   Pulse: 97   Resp: 16   SpO2: 99%   Weight: 100 kg (221 lb)   Height: 158.1 cm (62.25\")     Body mass index is 40.1 kg/m².  Physical Exam   Constitutional: She is oriented to person, place, and time. She appears well-developed and well-nourished. No distress.   HENT:   Head: Normocephalic and atraumatic.   Eyes: Pupils are equal, round, and reactive to light.   Neck: Neck supple. No thyromegaly present.   Cardiovascular: Normal rate and regular rhythm.   Pulmonary/Chest: Effort normal and breath sounds normal. No stridor. No respiratory distress.   Abdominal: Soft. She exhibits no distension.   Musculoskeletal: She exhibits no edema or deformity.   Neurological: She is alert and oriented to person, place, and time.   Skin: Skin is warm and dry. Capillary refill takes 2 to 3 seconds. She is not diaphoretic.   Psychiatric: She has a normal mood and affect. Her behavior is normal. Judgment and thought content normal.   Nursing note and vitals reviewed.    ECG 12 Lead  Date/Time: 1/30/2019 3:19 PM  Performed by: Yasmin Hanna APRN  Authorized by: Yasmin Hanna APRN   Comparison: not compared with previous ECG   Rhythm: sinus rhythm  Rate: normal  ST Elevation: V4, V5, I, V2 and II  QRS axis: normal  Other findings: early repolarization  Clinical impression: abnormal ECG            Assessment/Plan   Problem List Items Addressed This Visit        Endocrine    Type 2 diabetes mellitus without complication, without " long-term current use of insulin (CMS/HCC) - Primary    Relevant Medications    Lancets (FREESTYLE) lancets    glucose blood (FREESTYLE LITE) test strip    Alogliptin-Metformin HCl 12.5-1000 MG tablet    Liraglutide (VICTOZA) 18 MG/3ML solution pen-injector injection    Other Relevant Orders    CBC & Differential    Comprehensive Metabolic Panel    Lipid Panel    PCOS (polycystic ovarian syndrome)    Relevant Medications    Alogliptin-Metformin HCl 12.5-1000 MG tablet    Other Relevant Orders    Iron Profile    Hypothyroid    Relevant Medications    levothyroxine (SYNTHROID) 50 MCG tablet    Other Relevant Orders    TSH      Other Visit Diagnoses     Other chest pain        Relevant Orders    ECG 12 Lead    Treadmill Stress Test    Neuropathy        Relevant Orders    EMG 2 Limbs    CBC & Differential    Comprehensive Metabolic Panel    Vitamin B12    Gastroesophageal reflux disease without esophagitis        Relevant Medications    pantoprazole (PROTONIX) 40 MG EC tablet        Yasmin was seen today for diabetes and med refill.    Diagnoses and all orders for this visit:    Type 2 diabetes mellitus without complication, without long-term current use of insulin (CMS/HCC)  -     Cancel: Microalbumin / Creatinine Urine Ratio - Urine, Clean Catch  -     Alogliptin-Metformin HCl 12.5-1000 MG tablet; Take 1 tablet by mouth 2 (Two) Times a Day.  -     Liraglutide (VICTOZA) 18 MG/3ML solution pen-injector injection; 0.6 mg daily for week 1, then 1.2 mg daily  -     CBC & Differential  -     Comprehensive Metabolic Panel  -     Lipid Panel        -    Discussed need for low carb diet.  FU in 3 months  Other specified hypothyroidism  -     TSH    PCOS (polycystic ovarian syndrome)  -     Alogliptin-Metformin HCl 12.5-1000 MG tablet; Take 1 tablet by mouth 2 (Two) Times a Day.  -     Iron Profile    Other chest pain  -     ECG 12 Lead- abnormal- advised to go directly to ED for cardiac workup- R/O MI  -     Treadmill Stress  Test; Future    Neuropathy  -     EMG 2 Limbs; Future  -     CBC & Differential  -     Comprehensive Metabolic Panel  -     Vitamin B12         -    Carpal tunnel vs vit def vs dm neuropathy- will work up more at FU- pt advised to go to ED for CP  Gastroesophageal reflux disease without esophagitis  -     pantoprazole (PROTONIX) 40 MG EC tablet; Take 1 tablet by mouth Daily.                    The patient was counseled regarding diagnostic results, impressions, prognosis, instructions for management, risk factor reductions, education, and importance of treatment compliance.  The patient verbalized understanding of and agreement with the plan of care.    Advised patient to call with any further questions and any new or worsening symptoms.     Return for Advised to go to ED- FU dependent on ER dx.      JAIMIE Khan

## 2019-01-31 ENCOUNTER — TELEPHONE (OUTPATIENT)
Dept: INTERNAL MEDICINE | Facility: CLINIC | Age: 34
End: 2019-01-31

## 2019-01-31 ENCOUNTER — PRIOR AUTHORIZATION (OUTPATIENT)
Dept: INTERNAL MEDICINE | Facility: CLINIC | Age: 34
End: 2019-01-31

## 2019-01-31 NOTE — TELEPHONE ENCOUNTER
PA submitted via cover my meds for pt's victoza. She has tried and failed metformin due to the side effects. She started Kazano but needed something in conjunction with it to help blood sugars come down. PA submitted, KEY: [id number]. Awaiting response.

## 2019-02-01 ENCOUNTER — TELEPHONE (OUTPATIENT)
Dept: INTERNAL MEDICINE | Facility: CLINIC | Age: 34
End: 2019-02-01

## 2019-02-01 DIAGNOSIS — R07.9 CHEST PAIN, UNSPECIFIED TYPE: ICD-10-CM

## 2019-02-01 DIAGNOSIS — R94.31 ABNORMAL ECG: Primary | ICD-10-CM

## 2019-02-01 NOTE — TELEPHONE ENCOUNTER
Pt reports she is doing well- not having chest pain- agreeable to having echo related to abnormal ecg- order placed.

## 2019-02-04 ENCOUNTER — TELEPHONE (OUTPATIENT)
Dept: OBSTETRICS AND GYNECOLOGY | Facility: CLINIC | Age: 34
End: 2019-02-04

## 2019-02-04 ENCOUNTER — TELEPHONE (OUTPATIENT)
Dept: INTERNAL MEDICINE | Facility: CLINIC | Age: 34
End: 2019-02-04

## 2019-02-04 RX ORDER — LEVOTHYROXINE SODIUM 0.05 MG/1
50 TABLET ORAL DAILY
Qty: 30 TABLET | Refills: 5 | Status: SHIPPED | OUTPATIENT
Start: 2019-02-04 | End: 2019-03-18 | Stop reason: SDUPTHER

## 2019-02-05 RX ORDER — PEN NEEDLE, DIABETIC 30 GX3/16"
18 NEEDLE, DISPOSABLE MISCELLANEOUS DAILY
Qty: 100 EACH | Refills: 0 | Status: SHIPPED | OUTPATIENT
Start: 2019-02-05 | End: 2019-05-06 | Stop reason: SDUPTHER

## 2019-02-14 ENCOUNTER — HOSPITAL ENCOUNTER (OUTPATIENT)
Dept: CARDIOLOGY | Facility: HOSPITAL | Age: 34
Discharge: HOME | End: 2019-02-14
Attending: NURSE PRACTITIONER | Admitting: NURSE PRACTITIONER

## 2019-02-14 ENCOUNTER — HOSPITAL ENCOUNTER (OUTPATIENT)
Dept: CARDIOLOGY | Facility: HOSPITAL | Age: 34
Discharge: HOME | End: 2019-02-14
Attending: NURSE PRACTITIONER

## 2019-02-14 VITALS — HEIGHT: 62 IN | WEIGHT: 221 LBS | BODY MASS INDEX: 40.67 KG/M2

## 2019-02-14 VITALS — HEIGHT: 62 IN | WEIGHT: 220.46 LBS | BODY MASS INDEX: 40.57 KG/M2

## 2019-02-14 DIAGNOSIS — R07.89 OTHER CHEST PAIN: ICD-10-CM

## 2019-02-14 DIAGNOSIS — R94.31 ABNORMAL ECG: ICD-10-CM

## 2019-02-14 DIAGNOSIS — R07.9 CHEST PAIN, UNSPECIFIED TYPE: ICD-10-CM

## 2019-02-14 LAB
BH CV ECHO MEAS - AO ROOT AREA (BSA CORRECTED): 1.4
BH CV ECHO MEAS - AO ROOT AREA: 6.4 CM^2
BH CV ECHO MEAS - AO ROOT DIAM: 2.9 CM
BH CV ECHO MEAS - ASC AORTA: 2.7 CM
BH CV ECHO MEAS - BSA(HAYCOCK): 2.1 M^2
BH CV ECHO MEAS - BSA: 2 M^2
BH CV ECHO MEAS - BZI_BMI: 40.4 KILOGRAMS/M^2
BH CV ECHO MEAS - BZI_METRIC_HEIGHT: 157.5 CM
BH CV ECHO MEAS - BZI_METRIC_WEIGHT: 100.2 KG
BH CV ECHO MEAS - EDV(CUBED): 88.4 ML
BH CV ECHO MEAS - EDV(MOD-SP2): 119 ML
BH CV ECHO MEAS - EDV(MOD-SP4): 118 ML
BH CV ECHO MEAS - EDV(TEICH): 90.3 ML
BH CV ECHO MEAS - EF(CUBED): 70.9 %
BH CV ECHO MEAS - EF(MOD-BP): 59 %
BH CV ECHO MEAS - EF(MOD-SP2): 64.7 %
BH CV ECHO MEAS - EF(MOD-SP4): 53.4 %
BH CV ECHO MEAS - EF(TEICH): 62.7 %
BH CV ECHO MEAS - ESV(CUBED): 25.7 ML
BH CV ECHO MEAS - ESV(MOD-SP2): 42 ML
BH CV ECHO MEAS - ESV(MOD-SP4): 55 ML
BH CV ECHO MEAS - ESV(TEICH): 33.6 ML
BH CV ECHO MEAS - FS: 33.7 %
BH CV ECHO MEAS - IVS/LVPW: 0.85
BH CV ECHO MEAS - IVSD: 1.2 CM
BH CV ECHO MEAS - LAD MAJOR: 5.5 CM
BH CV ECHO MEAS - LAT PEAK E' VEL: 10.6 CM/SEC
BH CV ECHO MEAS - LATERAL E/E' RATIO: 8
BH CV ECHO MEAS - LV DIASTOLIC VOL/BSA (35-75): 59.2 ML/M^2
BH CV ECHO MEAS - LV IVRT: 0.08 SEC
BH CV ECHO MEAS - LV MASS(C)D: 215.2 GRAMS
BH CV ECHO MEAS - LV MASS(C)DI: 107.9 GRAMS/M^2
BH CV ECHO MEAS - LV SYSTOLIC VOL/BSA (12-30): 27.6 ML/M^2
BH CV ECHO MEAS - LVIDD: 4.5 CM
BH CV ECHO MEAS - LVIDS: 3 CM
BH CV ECHO MEAS - LVLD AP2: 7.6 CM
BH CV ECHO MEAS - LVLD AP4: 7.7 CM
BH CV ECHO MEAS - LVLS AP2: 5.7 CM
BH CV ECHO MEAS - LVLS AP4: 5.8 CM
BH CV ECHO MEAS - LVOT AREA (M): 3.1 CM^2
BH CV ECHO MEAS - LVOT AREA: 3.1 CM^2
BH CV ECHO MEAS - LVOT DIAM: 2 CM
BH CV ECHO MEAS - LVPWD: 1.4 CM
BH CV ECHO MEAS - MED PEAK E' VEL: 10.9 CM/SEC
BH CV ECHO MEAS - MEDIAL E/E' RATIO: 7.8
BH CV ECHO MEAS - MPA AREA: 6.3 CM^2
BH CV ECHO MEAS - MPA DIAM: 2.8 CM
BH CV ECHO MEAS - MV A MAX VEL: 76.5 CM/SEC
BH CV ECHO MEAS - MV DEC TIME: 0.24 SEC
BH CV ECHO MEAS - MV E MAX VEL: 86.4 CM/SEC
BH CV ECHO MEAS - MV E/A: 1.1
BH CV ECHO MEAS - PA ACC SLOPE: 820.6 CM/SEC^2
BH CV ECHO MEAS - PA ACC TIME: 0.12 SEC
BH CV ECHO MEAS - PA PR(ACCEL): 25.1 MMHG
BH CV ECHO MEAS - PULM A REVS VEL: 40.5 CM/SEC
BH CV ECHO MEAS - PULM DIAS VEL: 48.9 CM/SEC
BH CV ECHO MEAS - PULM S/D: 1.1
BH CV ECHO MEAS - PULM SYS VEL: 54.8 CM/SEC
BH CV ECHO MEAS - RAP SYSTOLE: 3 MMHG
BH CV ECHO MEAS - RVSP: 24 MMHG
BH CV ECHO MEAS - SI(CUBED): 31.4 ML/M^2
BH CV ECHO MEAS - SI(MOD-SP2): 38.6 ML/M^2
BH CV ECHO MEAS - SI(MOD-SP4): 31.6 ML/M^2
BH CV ECHO MEAS - SI(TEICH): 28.4 ML/M^2
BH CV ECHO MEAS - SV(CUBED): 62.7 ML
BH CV ECHO MEAS - SV(MOD-SP2): 77 ML
BH CV ECHO MEAS - SV(MOD-SP4): 63 ML
BH CV ECHO MEAS - SV(TEICH): 56.6 ML
BH CV ECHO MEAS - TAPSE (>1.6): 2.5 CM2
BH CV ECHO MEAS - TR MAX PG: 21 MMHG
BH CV ECHO MEAS - TR MAX VEL: 229 CM/SEC
BH CV ECHO MEASUREMENTS AVERAGE E/E' RATIO: 8.04
BH CV STRESS BP STAGE 1: NORMAL
BH CV STRESS BP STAGE 2: NORMAL
BH CV STRESS BP STAGE 3: NORMAL
BH CV STRESS DURATION MIN STAGE 1: 3
BH CV STRESS DURATION MIN STAGE 2: 3
BH CV STRESS DURATION MIN STAGE 3: 3
BH CV STRESS DURATION SEC STAGE 1: 0
BH CV STRESS DURATION SEC STAGE 2: 0
BH CV STRESS DURATION SEC STAGE 3: 0
BH CV STRESS GRADE STAGE 1: 10
BH CV STRESS GRADE STAGE 2: 12
BH CV STRESS GRADE STAGE 3: 14
BH CV STRESS HR STAGE 1: 127
BH CV STRESS HR STAGE 2: 150
BH CV STRESS HR STAGE 3: 176
BH CV STRESS METS STAGE 1: 5
BH CV STRESS METS STAGE 2: 7.5
BH CV STRESS METS STAGE 3: 10
BH CV STRESS PROTOCOL 1: NORMAL
BH CV STRESS RECOVERY BP: NORMAL MMHG
BH CV STRESS RECOVERY HR: 91 BPM
BH CV STRESS RECOVERY O2: 97 %
BH CV STRESS SPEED STAGE 1: 1.7
BH CV STRESS SPEED STAGE 2: 2.5
BH CV STRESS SPEED STAGE 3: 3.4
BH CV STRESS STAGE 1: 1
BH CV STRESS STAGE 2: 2
BH CV STRESS STAGE 3: 3
BH CV VAS BP LEFT ARM: NORMAL MMHG
BH CV XLRA - RV BASE: 4.8 CM
BH CV XLRA - RV LENGTH: 7.2 CM
BH CV XLRA - RV MID: 3.4 CM
BH CV XLRA - TDI S': 12.3 CM/SEC
LEFT ATRIUM VOLUME INDEX: 28.6 ML/M^2
LEFT ATRIUM VOLUME: 57 ML
LV EF 2D ECHO EST: 60 %
MAXIMAL PREDICTED HEART RATE: 188 BPM
PERCENT MAX PREDICTED HR: 93.62 %
STRESS BASELINE BP: NORMAL MMHG
STRESS BASELINE HR: 77 BPM
STRESS PERCENT HR: 110 %
STRESS POST ESTIMATED WORKLOAD: 10.1 METS
STRESS POST EXERCISE DUR MIN: 9 MIN
STRESS POST EXERCISE DUR SEC: 1 SEC
STRESS POST O2 SAT PEAK: 96 %
STRESS POST PEAK BP: NORMAL MMHG
STRESS POST PEAK HR: 176 BPM
STRESS TARGET HR: 160 BPM

## 2019-02-14 PROCEDURE — 93306 TTE W/DOPPLER COMPLETE: CPT | Performed by: INTERNAL MEDICINE

## 2019-02-14 PROCEDURE — 93017 CV STRESS TEST TRACING ONLY: CPT

## 2019-02-14 PROCEDURE — 93306 TTE W/DOPPLER COMPLETE: CPT

## 2019-02-14 PROCEDURE — 93018 CV STRESS TEST I&R ONLY: CPT | Performed by: INTERNAL MEDICINE

## 2019-02-15 ENCOUNTER — TELEPHONE (OUTPATIENT)
Dept: INTERNAL MEDICINE | Facility: CLINIC | Age: 34
End: 2019-02-15

## 2019-02-15 DIAGNOSIS — I51.7 MILD CONCENTRIC LEFT VENTRICULAR HYPERTROPHY (LVH): Primary | ICD-10-CM

## 2019-02-15 NOTE — TELEPHONE ENCOUNTER
Discussed LVH- no documented HTN/ microalbuminuria.  Not a candidate for ACE (trying to concieve)- weight loss stressed- will start ACE when not trying to conceive- FU in 1 month. Pt verbalized understanding and denied further questions at this time.

## 2019-03-07 ENCOUNTER — HOSPITAL ENCOUNTER (OUTPATIENT)
Dept: NEUROLOGY | Facility: HOSPITAL | Age: 34
Discharge: HOME | End: 2019-03-07
Attending: NURSE PRACTITIONER | Admitting: NURSE PRACTITIONER

## 2019-03-07 DIAGNOSIS — G62.9 NEUROPATHY: ICD-10-CM

## 2019-03-07 DIAGNOSIS — G56.03 BILATERAL CARPAL TUNNEL SYNDROME: Primary | ICD-10-CM

## 2019-03-07 PROCEDURE — 95910 NRV CNDJ TEST 7-8 STUDIES: CPT

## 2019-03-07 PROCEDURE — 95886 MUSC TEST DONE W/N TEST COMP: CPT

## 2019-03-07 NOTE — PROGRESS NOTES
Can you let her know her nerve study shows carpal tunnel in both hands, I have put in a referral for her to see an orthopedic surgeon about this

## 2019-03-15 ENCOUNTER — OFFICE VISIT (OUTPATIENT)
Dept: INTERNAL MEDICINE | Facility: CLINIC | Age: 34
End: 2019-03-15

## 2019-03-15 VITALS
RESPIRATION RATE: 14 BRPM | WEIGHT: 216 LBS | SYSTOLIC BLOOD PRESSURE: 102 MMHG | DIASTOLIC BLOOD PRESSURE: 64 MMHG | BODY MASS INDEX: 39.75 KG/M2 | HEART RATE: 70 BPM | HEIGHT: 62 IN

## 2019-03-15 DIAGNOSIS — E11.9 TYPE 2 DIABETES MELLITUS WITHOUT COMPLICATION, WITHOUT LONG-TERM CURRENT USE OF INSULIN (HCC): ICD-10-CM

## 2019-03-15 DIAGNOSIS — I51.7 MILD CONCENTRIC LEFT VENTRICULAR HYPERTROPHY (LVH): Primary | ICD-10-CM

## 2019-03-15 DIAGNOSIS — R53.83 FATIGUE, UNSPECIFIED TYPE: ICD-10-CM

## 2019-03-15 DIAGNOSIS — E03.8 OTHER SPECIFIED HYPOTHYROIDISM: ICD-10-CM

## 2019-03-15 PROCEDURE — 99214 OFFICE O/P EST MOD 30 MIN: CPT | Performed by: NURSE PRACTITIONER

## 2019-03-15 RX ORDER — ALOGLIPTIN AND METFORMIN HYDROCHLORIDE 12.5; 1 MG/1; MG/1
1 TABLET, FILM COATED ORAL 2 TIMES DAILY
Qty: 60 TABLET | Refills: 2 | Status: SHIPPED | OUTPATIENT
Start: 2019-03-15 | End: 2019-05-06

## 2019-03-15 NOTE — PROGRESS NOTES
Subjective   Yasmin Singleton is a 33 y.o. female here today for LVH/ hypothyroidism/diabetes.    Chief Complaint   Patient presents with   • Diabetes     new med f/u?     Yasmin was last seen for DM in January and A1C was at 9.6.  She was started on victoza in addition to her alogliptin-metformin. UTD on PNA and Flu vaccines- UTD on eye and foot exam.  Her blood sugars have been between 114-250- mostly around 160.      She was also seen for CP and echo reveals LVH.  Weight loss was stressed.  As Yasmin has been trying to conceive, ace inhibitors and statins have been avoided. She has lost 5 pounds since our last visit.  No longer having CP.      Compliant with synthroid- has been having more fatigue lately    Review of Systems   Constitutional: Positive for fatigue. Negative for activity change, appetite change, unexpected weight gain and unexpected weight loss.   Eyes: Negative for blurred vision and double vision.   Respiratory: Negative for chest tightness and shortness of breath.    Cardiovascular: Negative for chest pain and leg swelling.   Gastrointestinal: Negative for diarrhea, nausea and vomiting.   Endocrine: Negative for cold intolerance, heat intolerance, polydipsia, polyphagia and polyuria.   Skin: Negative for rash and skin lesions.   Neurological: Negative for dizziness, seizures, syncope, facial asymmetry, weakness, light-headedness, headache, memory problem and confusion.       Past Medical History:   Diagnosis Date   • Diabetes (CMS/HCC)    • GERD (gastroesophageal reflux disease)    • History of recurrent UTIs    • Seasonal allergies      Family History   Problem Relation Age of Onset   • Cancer Father         Unsure what kind   • Hypertension Father    • Stroke Father    • Kidney failure Mother      Past Surgical History:   Procedure Laterality Date   • WISDOM TOOTH EXTRACTION  2015     Social History     Socioeconomic History   • Marital status: Single     Spouse name: Not on file   • Number of  "children: Not on file   • Years of education: Not on file   • Highest education level: Not on file   Social Needs   • Financial resource strain: Not on file   • Food insecurity - worry: Not on file   • Food insecurity - inability: Not on file   • Transportation needs - medical: Not on file   • Transportation needs - non-medical: Not on file   Occupational History   • Not on file   Tobacco Use   • Smoking status: Current Every Day Smoker     Packs/day: 0.50     Years: 12.00     Pack years: 6.00     Types: Cigarettes   • Smokeless tobacco: Never Used   Substance and Sexual Activity   • Alcohol use: Yes     Comment: on occasion, at most 1-2 drinks a month   • Drug use: Yes     Frequency: 4.0 times per week     Types: Marijuana   • Sexual activity: Yes     Partners: Male     Birth control/protection: None   Other Topics Concern   • Not on file   Social History Narrative   • Not on file         Current Outpatient Medications:   •  Alogliptin-metFORMIN HCl 12.5-1000 MG tablet, Take 1 tablet by mouth 2 (Two) Times a Day., Disp: 60 tablet, Rfl: 2  •  glucose blood (FREESTYLE LITE) test strip, 1 each by Other route 2 (Two) Times a Day. Use as instructed, Disp: 100 each, Rfl: 12  •  Insulin Pen Needle (PEN NEEDLES) 31G X 5 MM misc, 18 mL Daily., Disp: 100 each, Rfl: 0  •  Lancets (FREESTYLE) lancets, 1 each by Other route Daily. Use as instructed, Disp: 100 each, Rfl: 11  •  levothyroxine (SYNTHROID) 50 MCG tablet, Take 1 tablet by mouth Daily., Disp: 30 tablet, Rfl: 5  •  Liraglutide (VICTOZA) 18 MG/3ML solution pen-injector injection, 1.8 mg daily, Disp: 3 mL, Rfl: 5  •  pantoprazole (PROTONIX) 40 MG EC tablet, Take 1 tablet by mouth Daily., Disp: 30 tablet, Rfl: 2    Objective   Vitals:    03/15/19 1345   BP: 102/64   Pulse: 70   Resp: 14   Weight: 98 kg (216 lb)   Height: 157.5 cm (62\")     Body mass index is 39.51 kg/m².  Physical Exam   Constitutional: She is oriented to person, place, and time. She appears " well-developed and well-nourished. No distress.   Eyes: Pupils are equal, round, and reactive to light.   Neck: Neck supple. No thyromegaly present.   Cardiovascular: Normal rate and regular rhythm.   Pulmonary/Chest: Effort normal and breath sounds normal.   Neurological: She is alert and oriented to person, place, and time.   Skin: Skin is warm and dry. Capillary refill takes 2 to 3 seconds. She is not diaphoretic.   Psychiatric: She has a normal mood and affect. Her behavior is normal. Judgment and thought content normal.   Nursing note and vitals reviewed.      Assessment/Plan   Problem List Items Addressed This Visit        Cardiovascular and Mediastinum    Mild concentric left ventricular hypertrophy (LVH) - Primary    Relevant Orders    CBC & Differential    Comprehensive Metabolic Panel       Endocrine    Type 2 diabetes mellitus without complication, without long-term current use of insulin (CMS/HCC)    Relevant Medications    Lancets (FREESTYLE) lancets    glucose blood (FREESTYLE LITE) test strip    Liraglutide (VICTOZA) 18 MG/3ML solution pen-injector injection    Alogliptin-metFORMIN HCl 12.5-1000 MG tablet    Other Relevant Orders    Microalbumin / Creatinine Urine Ratio - Urine, Clean Catch    Hypothyroid    Relevant Medications    levothyroxine (SYNTHROID) 50 MCG tablet    Other Relevant Orders    TSH      Other Visit Diagnoses     Fatigue, unspecified type        Relevant Orders    hCG, Serum, Qualitative        Yasmin was seen today for diabetes.    Diagnoses and all orders for this visit:    Mild concentric left ventricular hypertrophy (LVH)  -     CBC & Differential  -     Comprehensive Metabolic Panel  Continue with weight loss efforts.  ACE inhibitor contraindicated related to trying to conceive.  BP stable.  Other specified hypothyroidism  -     TSH    Type 2 diabetes mellitus without complication, without long-term current use of insulin (CMS/HCC)  -     Microalbumin / Creatinine Urine Ratio -  Urine, Clean Catch  -     Liraglutide (VICTOZA) 18 MG/3ML solution pen-injector injection; 1.8 mg daily  -     Alogliptin-metFORMIN HCl 12.5-1000 MG tablet; Take 1 tablet by mouth 2 (Two) Times a Day.         -     Increase Victoza to 1.8 mg daily.  Will repeat A1c at next scheduled follow-up  Fatigue, unspecified type  -     hCG, Serum, Qualitative             The patient was counseled regarding diagnostic results, impressions, prognosis, instructions for management, risk factor reductions, education, and importance of treatment compliance.  The patient verbalized understanding of and agreement with the plan of care.    Advised patient to call with any further questions and any new or worsening symptoms.     Return for Next scheduled follow up.      Yasmin Hanna, APRN

## 2019-03-16 LAB
ALBUMIN SERPL-MCNC: 4.69 G/DL (ref 3.2–4.8)
ALBUMIN/CREAT UR: 6.3 MG/G CREAT (ref 0–30)
ALBUMIN/GLOB SERPL: 2 G/DL (ref 1.5–2.5)
ALP SERPL-CCNC: 76 U/L (ref 25–100)
ALT SERPL-CCNC: 45 U/L (ref 7–40)
AST SERPL-CCNC: 32 U/L (ref 0–33)
B-HCG SERPL QL: NEGATIVE MIU/ML
BASOPHILS # BLD AUTO: 0.03 10*3/MM3 (ref 0–0.2)
BASOPHILS NFR BLD AUTO: 0.3 % (ref 0–1)
BILIRUB SERPL-MCNC: 0.4 MG/DL (ref 0.3–1.2)
BUN SERPL-MCNC: 7 MG/DL (ref 9–23)
BUN/CREAT SERPL: 9.5 (ref 7–25)
CALCIUM SERPL-MCNC: 9.5 MG/DL (ref 8.7–10.4)
CHLORIDE SERPL-SCNC: 106 MMOL/L (ref 99–109)
CO2 SERPL-SCNC: 25 MMOL/L (ref 20–31)
CREAT SERPL-MCNC: 0.74 MG/DL (ref 0.6–1.3)
CREAT UR-MCNC: 204.8 MG/DL
EOSINOPHIL # BLD AUTO: 0.21 10*3/MM3 (ref 0–0.3)
EOSINOPHIL NFR BLD AUTO: 2.4 % (ref 0–3)
ERYTHROCYTE [DISTWIDTH] IN BLOOD BY AUTOMATED COUNT: 14.9 % (ref 11.3–14.5)
GLOBULIN SER CALC-MCNC: 2.3 GM/DL
GLUCOSE SERPL-MCNC: 159 MG/DL (ref 70–100)
HCT VFR BLD AUTO: 42.9 % (ref 34.5–44)
HGB BLD-MCNC: 13.9 G/DL (ref 11.5–15.5)
IMM GRANULOCYTES # BLD AUTO: 0.03 10*3/MM3 (ref 0–0.05)
IMM GRANULOCYTES NFR BLD AUTO: 0.3 % (ref 0–0.6)
LYMPHOCYTES # BLD AUTO: 3.65 10*3/MM3 (ref 0.6–4.8)
LYMPHOCYTES NFR BLD AUTO: 41.7 % (ref 24–44)
MCH RBC QN AUTO: 28.9 PG (ref 27–31)
MCHC RBC AUTO-ENTMCNC: 32.4 G/DL (ref 32–36)
MCV RBC AUTO: 89.2 FL (ref 80–99)
MICROALBUMIN UR-MCNC: 13 UG/ML
MONOCYTES # BLD AUTO: 0.63 10*3/MM3 (ref 0–1)
MONOCYTES NFR BLD AUTO: 7.2 % (ref 0–12)
NEUTROPHILS # BLD AUTO: 4.24 10*3/MM3 (ref 1.5–8.3)
NEUTROPHILS NFR BLD AUTO: 48.4 % (ref 41–71)
PLATELET # BLD AUTO: 284 10*3/MM3 (ref 150–450)
POTASSIUM SERPL-SCNC: 4.1 MMOL/L (ref 3.5–5.5)
PROT SERPL-MCNC: 7 G/DL (ref 5.7–8.2)
RBC # BLD AUTO: 4.81 10*6/MM3 (ref 3.89–5.14)
SODIUM SERPL-SCNC: 138 MMOL/L (ref 132–146)
TSH SERPL DL<=0.005 MIU/L-ACNC: 1.66 MIU/ML (ref 0.35–5.35)
WBC # BLD AUTO: 8.76 10*3/MM3 (ref 3.5–10.8)

## 2019-03-18 ENCOUNTER — TELEPHONE (OUTPATIENT)
Dept: INTERNAL MEDICINE | Facility: CLINIC | Age: 34
End: 2019-03-18

## 2019-03-18 DIAGNOSIS — E03.8 OTHER SPECIFIED HYPOTHYROIDISM: Primary | ICD-10-CM

## 2019-03-18 PROBLEM — R74.8 ELEVATED LIVER ENZYMES: Status: ACTIVE | Noted: 2019-03-18

## 2019-03-18 RX ORDER — LEVOTHYROXINE SODIUM 0.07 MG/1
75 TABLET ORAL DAILY
Qty: 90 TABLET | Refills: 1 | Status: SHIPPED | OUTPATIENT
Start: 2019-03-18 | End: 2019-05-06 | Stop reason: SDUPTHER

## 2019-03-19 ENCOUNTER — TELEPHONE (OUTPATIENT)
Dept: ORTHOPEDIC SURGERY | Facility: CLINIC | Age: 34
End: 2019-03-19

## 2019-03-26 ENCOUNTER — OFFICE VISIT (OUTPATIENT)
Dept: ORTHOPEDIC SURGERY | Facility: CLINIC | Age: 34
End: 2019-03-26

## 2019-03-26 VITALS — WEIGHT: 212.74 LBS | HEIGHT: 62 IN | OXYGEN SATURATION: 98 % | HEART RATE: 76 BPM | BODY MASS INDEX: 39.15 KG/M2

## 2019-03-26 DIAGNOSIS — G56.03 BILATERAL CARPAL TUNNEL SYNDROME: Primary | ICD-10-CM

## 2019-03-26 PROCEDURE — 99203 OFFICE O/P NEW LOW 30 MIN: CPT | Performed by: PHYSICIAN ASSISTANT

## 2019-03-26 NOTE — PROGRESS NOTES
[facility] [place]    Subjective     Chief Complaint   Patient presents with   • Right Hand - Pain   • Left Hand - Pain        HPI      Yasmin Singleton is a 33 y.o. female.  Left hand dominant.  Patient presents for evaluation of bilateral hand numbness and tingling into the radial 3 digits.  Right greater than left.  Onset about 6 years ago.  States it is worse when she is working however when she is not working she has no symptoms at all.  Currently she is asymptomatic no pain.  She has had some mild issues at night with regards to the numbness and tingling that has awoken her.  She does use braces for control of her symptoms.  No reported fever, chills, night sweats or other constitutional symptoms.  Besides the bracing no other treatments have been prescribed.  Patient does have diabetes with an A1c as of 1/30/2019 of 9.6.      Past Medical History:   Diagnosis Date   • Diabetes (CMS/[facility])    • GERD (gastroesophageal reflux disease)    • History of recurrent UTIs    • Seasonal allergies       Past Surgical History:   Procedure Laterality Date   • WISDOM TOOTH EXTRACTION  2015      Family History   Problem Relation Age of Onset   • Cancer Father         Unsure what kind   • Hypertension Father    • Stroke Father    • Kidney failure Mother      Social History     Socioeconomic History   • Marital status: Single     Spouse name: Not on file   • Number of children: Not on file   • Years of education: Not on file   • Highest education level: Not on file   Tobacco Use   • Smoking status: Current Every Day Smoker     Packs/day: 0.50     Years: 12.00     Pack years: 6.00     Types: Cigarettes   • Smokeless tobacco: Never Used   Substance and Sexual Activity   • Alcohol use: Yes     Comment: on occasion, at most 1-2 drinks a month   • Drug use: Yes     Frequency: 4.0 times per week     Types: Marijuana   • Sexual activity: Yes     Partners: Male     Birth control/protection: None      Current  "Outpatient Medications on File Prior to Visit   Medication Sig Dispense Refill   • Alogliptin-metFORMIN HCl 12.5-1000 MG tablet Take 1 tablet by mouth 2 (Two) Times a Day. 60 tablet 2   • glucose blood (FREESTYLE LITE) test strip 1 each by Other route 2 (Two) Times a Day. Use as instructed 100 each 12   • Insulin Pen Needle (PEN NEEDLES) 31G X 5 MM misc 18 mL Daily. 100 each 0   • Lancets (FREESTYLE) lancets 1 each by Other route Daily. Use as instructed 100 each 11   • levothyroxine (SYNTHROID, LEVOTHROID) 75 MCG tablet Take 1 tablet by mouth Daily. 90 tablet 1   • Liraglutide (VICTOZA) 18 MG/3ML solution pen-injector injection 1.8 mg daily 3 mL 5   • pantoprazole (PROTONIX) 40 MG EC tablet Take 1 tablet by mouth Daily. 30 tablet 2     No current facility-administered medications on file prior to visit.       No Known Allergies     The following portions of the patient's history were reviewed and updated as appropriate: allergies, current medications, past family history, past medical history, past social history, past surgical history and problem list.    Review of Systems   Constitutional: Negative.    HENT: Negative.    Eyes: Negative.    Respiratory: Negative.    Cardiovascular: Negative.    Gastrointestinal: Negative.    Endocrine: Negative.    Genitourinary: Negative.    Musculoskeletal: Negative.         Bilateral wrist pain   Skin: Negative.    Allergic/Immunologic: Negative.    Neurological: Negative.    Hematological: Negative.    Psychiatric/Behavioral: Negative.         Objective      Physical Exam  Pulse 76   Ht 157.5 cm (62.01\")   Wt 96.5 kg (212 lb 11.9 oz)   SpO2 98%   Breastfeeding? No   BMI 38.90 kg/m²     Body mass index is 38.9 kg/m².        GENERAL APPEARANCE: awake, alert & oriented x 3, in no acute distress and well developed, well nourished  PSYCH: normal mood and affect  LUNGS:  breathing nonlabored, no wheezing  EYES: sclera anicteric, pupils equal  CARDIOVASCULAR: palpable pulses " dorsalis pedis, palpable posterior tibial bilaterally. Capillary refill less than 2 seconds  INTEGUMENTARY: skin intact, no clubbing, cyanosis  NEUROLOGIC:  Normal Sensation and reflexes             Ortho Exam  Peripheral Vascular   Bilateral Upper Extremity    No cyanotic nail beds    Pink nail beds and rapid capillary refill   Palpation    Radial Pulse - Bilaterally normal    Neurologic   Sensory: Light touch intact- Right and left hand    Left Upper Extremity    Left wrist extensors: 5/5    Left wrist flexors: 5/5    Left intrinsics: 5/5      Right Upper Extremity    Right wrist extensors: 5/5    Right wrist flexors: 5/5    Right intrinsics: 5/5    Musculoskeletal   Left Elbow    Forearm supination: AROM - 90 degrees    Forearm pronation: AROM - 90 degrees   Right Elbow    Forearm supination: AROM - 90 degrees    Forearm pronation: AROM - 90 degrees     Inspection and Palpation   Right Wrist      Tenderness - none    Swelling - none    Crepitus - none    Muscle tone - no atrophy   Left Wrist    Tenderness - none    Swelling - none    Crepitus - none    Muscle tone - no atrophy     ROJM:   Left Wrist    Flexion: AROM - 90 degrees    Extension: AROM - 90 degrees   Right Wrist    Flexion: AROM - 90 degrees    Extension: AROM - 90 degrees     Deformities, Malalignments, Discrepancies    None     Functional Testing   Right    Tinel's Sign-- negative    Phalen's Sign--negative    Carpal Compression Test--negative    Thenar wasting--minimal    APB--4+/5    Elbow Flexion test--negative    Cubital tunnel signs--negative   Left     Tinel's Sign--negative    Phalen's Sign--negative    Carpal Compression Test--negative    Thenar Wasting--minimal    APB--4+/5    Elbow Flexion test--negative    Cubital tunnel signs--negative       Strength and Tone    Right  strength: good    Left  strength: good      Imaging/Studies  Imaging Results (last 7 days)     Procedure Component Value Units Date/Time    XR Hand 3+ View  Bilateral [[id number]] Resulted:  03/26/19 1414     Updated:  03/26/19 1414    Narrative:       Bilateral Hand X-Ray    Indication: Pain    Views:  AP, Lateral, and Oblique     Comparison: none    Findings:  No fracture  No bony lesion  Normal soft tissues  Normal joint spaces    Impression: Negative bilateral hand x-rays for acute bony abnormalities.            Ordered bilateral hand films today.  Images reviewed by Dr. Ball.  No acute bony abnormality, fracture or dislocation.  Joint spaces are preserved.  No abnormal soft tissue findings.  See chart for official report.    Reviewed EMG/NCS performed on 3/7/2019 which showed bilateral median neuropathy.  Moderate on right with motor latency of 5.3 ms and mild to moderate on the left with motor latency at 4.7 ms.      Assessment/Plan        ICD-10-CM ICD-9-CM   1. Bilateral carpal tunnel syndrome G56.03 354.0       Orders Placed This Encounter   Procedures   • XR Hand 3+ View Bilateral        -Patient with bilateral carpal tunnel syndrome currently asymptomatic.  -We discussed the use of wearing her braces at night to help with the nocturnal symptoms.  -Recommend the use of over-the-counter pain medications as needed.  -When she does return to working if she notices a return of the symptoms we discussed possible corticosteroid injection versus proceeding with release.  -Patient uninterested in surgery or injections at this time, she will continue to observe.  She understands that if she does begin to have symptoms she could be damaging the nerve further and she will get back the clinic sooner for evaluation and treatment.  -Follow-up as needed.  -Questions and concerns answered.    Medical Decision Making  Management Options : over-the-counter medicine  Data/Risk: radiology tests, EMG/NCS tests    Hillary Cardenas PA-C  03/29/19  1:32 PM         EMR Dragon/Transcription disclaimer:  Much of this encounter note is an electronic transcription of spoken  language to printed text. Electronic transcription of spoken language may permit erroneous, or at times, nonsensical words or phrases to be inadvertently transcribed. Although I have reviewed the note for such errors, some may still exist.

## 2019-04-05 ENCOUNTER — TELEPHONE (OUTPATIENT)
Dept: INTERNAL MEDICINE | Facility: CLINIC | Age: 34
End: 2019-04-05

## 2019-05-06 ENCOUNTER — OFFICE VISIT (OUTPATIENT)
Dept: INTERNAL MEDICINE | Facility: CLINIC | Age: 34
End: 2019-05-06

## 2019-05-06 VITALS
BODY MASS INDEX: 37.91 KG/M2 | WEIGHT: 206 LBS | DIASTOLIC BLOOD PRESSURE: 100 MMHG | TEMPERATURE: 97.2 F | OXYGEN SATURATION: 100 % | SYSTOLIC BLOOD PRESSURE: 140 MMHG | HEIGHT: 62 IN | HEART RATE: 92 BPM

## 2019-05-06 DIAGNOSIS — I51.7 MILD CONCENTRIC LEFT VENTRICULAR HYPERTROPHY (LVH): ICD-10-CM

## 2019-05-06 DIAGNOSIS — R03.0 ELEVATED BP WITHOUT DIAGNOSIS OF HYPERTENSION: ICD-10-CM

## 2019-05-06 DIAGNOSIS — E11.9 TYPE 2 DIABETES MELLITUS WITHOUT COMPLICATION, WITHOUT LONG-TERM CURRENT USE OF INSULIN (HCC): Primary | ICD-10-CM

## 2019-05-06 DIAGNOSIS — K21.9 GASTROESOPHAGEAL REFLUX DISEASE WITHOUT ESOPHAGITIS: ICD-10-CM

## 2019-05-06 DIAGNOSIS — E03.8 OTHER SPECIFIED HYPOTHYROIDISM: ICD-10-CM

## 2019-05-06 LAB — HBA1C MFR BLD: 7.9 %

## 2019-05-06 PROCEDURE — 83036 HEMOGLOBIN GLYCOSYLATED A1C: CPT | Performed by: NURSE PRACTITIONER

## 2019-05-06 PROCEDURE — 99214 OFFICE O/P EST MOD 30 MIN: CPT | Performed by: NURSE PRACTITIONER

## 2019-05-06 RX ORDER — PEN NEEDLE, DIABETIC 30 GX3/16"
1.8 NEEDLE, DISPOSABLE MISCELLANEOUS DAILY
Qty: 100 EACH | Refills: 2 | Status: SHIPPED | OUTPATIENT
Start: 2019-05-06 | End: 2020-03-03 | Stop reason: SDUPTHER

## 2019-05-06 RX ORDER — RANITIDINE 150 MG/1
150 TABLET ORAL 2 TIMES DAILY
Qty: 60 TABLET | Refills: 5 | Status: SHIPPED | OUTPATIENT
Start: 2019-05-06 | End: 2019-12-02

## 2019-05-06 RX ORDER — LEVOTHYROXINE SODIUM 0.07 MG/1
75 TABLET ORAL DAILY
Qty: 90 TABLET | Refills: 1 | Status: SHIPPED | OUTPATIENT
Start: 2019-05-06 | End: 2020-04-09

## 2019-05-06 NOTE — PROGRESS NOTES
Subjective   Yasmin Singleton is a 33 y.o. female here today for DM    Chief Complaint   Patient presents with   • Diabetes   • Heartburn     refills   Yasmin is here today for follow-up on diabetes.  She has been compliant with her medication and denies adverse side effects.  Last visit Victoza was added on. She has lost 4 lbs since last visit.  She reports that she has had three episodes of emesis with the victoza but is excited about losing the weight. She had tried to cut down on soda.  She is UTD on eye/ foot exam/  UTD on PNA vaccine.  UTD on urine micro (negative).    Blood sugars in the 100-120's.     She is also needing a refill of her pantoprazole.  She reports she gets severe heartburn without it.      Review of Systems   Constitutional: Negative for activity change, appetite change, fatigue, unexpected weight gain and unexpected weight loss.   Eyes: Negative for blurred vision and double vision.   Respiratory: Negative for chest tightness and shortness of breath.    Cardiovascular: Negative for chest pain and leg swelling.   Gastrointestinal: Negative for diarrhea, nausea and vomiting.   Endocrine: Negative for cold intolerance, heat intolerance, polydipsia, polyphagia and polyuria.   Skin: Negative for rash and skin lesions.   Neurological: Negative for dizziness, seizures, syncope, facial asymmetry, weakness, light-headedness, headache, memory problem and confusion.       Past Medical History:   Diagnosis Date   • Diabetes (CMS/HCC)    • GERD (gastroesophageal reflux disease)    • History of recurrent UTIs    • Seasonal allergies      Family History   Problem Relation Age of Onset   • Cancer Father         Unsure what kind   • Hypertension Father    • Stroke Father    • Kidney failure Mother      Past Surgical History:   Procedure Laterality Date   • WISDOM TOOTH EXTRACTION  2015     Social History     Socioeconomic History   • Marital status: Single     Spouse name: Not on file   • Number of children: Not  "on file   • Years of education: Not on file   • Highest education level: Not on file   Tobacco Use   • Smoking status: Current Every Day Smoker     Packs/day: 0.50     Years: 12.00     Pack years: 6.00     Types: Cigarettes   • Smokeless tobacco: Never Used   Substance and Sexual Activity   • Alcohol use: Yes     Comment: on occasion, at most 1-2 drinks a month   • Drug use: Yes     Frequency: 4.0 times per week     Types: Marijuana   • Sexual activity: Yes     Partners: Male     Birth control/protection: None         Current Outpatient Medications:   •  glucose blood (FREESTYLE LITE) test strip, 1 each by Other route 2 (Two) Times a Day. Use as instructed, Disp: 100 each, Rfl: 12  •  Lancets (FREESTYLE) lancets, 1 each by Other route Daily. Use as instructed, Disp: 100 each, Rfl: 11  •  Empagliflozin-metFORMIN HCl 5-1000 MG tablet, Take 1 tablet by mouth 2 (Two) Times a Day., Disp: 60 tablet, Rfl: 5  •  Insulin Pen Needle (PEN NEEDLES) 31G X 5 MM misc, 1.8 mL Daily., Disp: 100 each, Rfl: 2  •  levothyroxine (SYNTHROID, LEVOTHROID) 75 MCG tablet, Take 1 tablet by mouth Daily., Disp: 90 tablet, Rfl: 1  •  Liraglutide (VICTOZA) 18 MG/3ML solution pen-injector injection, 1.8 mg daily, Disp: 3 mL, Rfl: 5  •  raNITIdine (ZANTAC) 150 MG tablet, Take 1 tablet by mouth 2 (Two) Times a Day., Disp: 60 tablet, Rfl: 5    Objective   Vitals:    05/06/19 1404   BP: 140/100   Pulse: 92   Temp: 97.2 °F (36.2 °C)   TempSrc: Temporal   SpO2: 100%   Weight: 93.4 kg (206 lb)   Height: 157.5 cm (62\")     Body mass index is 37.68 kg/m².  Physical Exam   Constitutional: She is oriented to person, place, and time. She appears well-developed and well-nourished. No distress.   Eyes: Pupils are equal, round, and reactive to light.   Neck: Neck supple. No thyromegaly present.   Cardiovascular: Normal rate and regular rhythm.   Pulmonary/Chest: Effort normal and breath sounds normal.   Neurological: She is alert and oriented to person, place, " and time.   Skin: Skin is warm and dry. Capillary refill takes 2 to 3 seconds. She is not diaphoretic.   Psychiatric: She has a normal mood and affect. Her behavior is normal. Judgment and thought content normal.   Nursing note and vitals reviewed.      Assessment/Plan   Problem List Items Addressed This Visit        Cardiovascular and Mediastinum    Mild concentric left ventricular hypertrophy (LVH)    Relevant Medications    Empagliflozin-metFORMIN HCl 5-1000 MG tablet       Endocrine    Type 2 diabetes mellitus without complication, without long-term current use of insulin (CMS/[facility]) - Primary    Relevant Medications    Lancets (FREESTYLE) lancets    glucose blood (FREESTYLE LITE) test strip    Liraglutide (VICTOZA) 18 MG/3ML solution pen-injector injection    Insulin Pen Needle (PEN NEEDLES) 31G X 5 MM misc    Empagliflozin-metFORMIN HCl 5-1000 MG tablet    Other Relevant Orders    POC Glycosylated Hemoglobin (Hb A1C)    Hypothyroid    Relevant Medications    levothyroxine (SYNTHROID, LEVOTHROID) 75 MCG tablet      Other Visit Diagnoses     Gastroesophageal reflux disease without esophagitis        Relevant Medications    raNITIdine (ZANTAC) 150 MG tablet    Elevated BP without diagnosis of hypertension            Yasmin was seen today for diabetes and heartburn.    Diagnoses and all orders for this visit:    Type 2 diabetes mellitus without complication, without long-term current use of insulin (CMS/[facility])  -     Liraglutide (VICTOZA) 18 MG/3ML solution pen-injector injection; 1.8 mg daily  -     Insulin Pen Needle (PEN NEEDLES) 31G X 5 MM misc; 1.8 mL Daily.  -     POC Glycosylated Hemoglobin (Hb A1C)  -     Empagliflozin-metFORMIN HCl 5-1000 MG tablet; Take 1 tablet by mouth 2 (Two) Times a Day.  Discontinue alogliptin and add on Jardiance.  Discussed increased risk of  infections.  She does have a history of recurrent UTIs.  Reassured that should she develop this side effect, we will find another  alternative.  Other specified hypothyroidism  -     levothyroxine (SYNTHROID, LEVOTHROID) 75 MCG tablet; Take 1 tablet by mouth Daily.  -     Stable, continue current treatment plan    Gastroesophageal reflux disease without esophagitis  -     raNITIdine (ZANTAC) 150 MG tablet; Take 1 tablet by mouth 2 (Two) Times a Day.  We will try to wean from Protonix to Zantac twice a day.  I have advised her that should this not effectively manage her symptoms she may call the clinic and I will change it back to Protonix.  Elevated BP without diagnosis of hypertension  BP typically within normal limits.,  Will continue to monitor, may have reduction with Jardiance addition.               The patient was counseled regarding diagnostic results, impressions, prognosis, instructions for management, risk factor reductions, education, and importance of treatment compliance.  The patient verbalized understanding of and agreement with the plan of care.    Advised patient to call with any further questions and any new or worsening symptoms.     Return in about 3 months (around 8/6/2019) for Physical w/Next Visit.      Yasmin Hanna, APRN

## 2019-08-06 ENCOUNTER — OFFICE VISIT (OUTPATIENT)
Dept: INTERNAL MEDICINE | Facility: CLINIC | Age: 34
End: 2019-08-06

## 2019-08-06 VITALS
DIASTOLIC BLOOD PRESSURE: 82 MMHG | BODY MASS INDEX: 35.61 KG/M2 | HEIGHT: 63 IN | WEIGHT: 201 LBS | TEMPERATURE: 97.9 F | SYSTOLIC BLOOD PRESSURE: 130 MMHG | RESPIRATION RATE: 20 BRPM | OXYGEN SATURATION: 100 % | HEART RATE: 84 BPM

## 2019-08-06 DIAGNOSIS — G89.29 CHRONIC PAIN OF BOTH KNEES: ICD-10-CM

## 2019-08-06 DIAGNOSIS — Z72.0 TOBACCO ABUSE: ICD-10-CM

## 2019-08-06 DIAGNOSIS — M25.562 CHRONIC PAIN OF BOTH KNEES: ICD-10-CM

## 2019-08-06 DIAGNOSIS — Z00.00 ANNUAL PHYSICAL EXAM: Primary | ICD-10-CM

## 2019-08-06 DIAGNOSIS — F43.21 SITUATIONAL DEPRESSION: ICD-10-CM

## 2019-08-06 DIAGNOSIS — M25.561 CHRONIC PAIN OF BOTH KNEES: ICD-10-CM

## 2019-08-06 PROCEDURE — 99395 PREV VISIT EST AGE 18-39: CPT | Performed by: NURSE PRACTITIONER

## 2019-08-06 RX ORDER — PRENATAL VIT NO.126/IRON/FOLIC 28MG-0.8MG
TABLET ORAL DAILY
COMMUNITY
End: 2020-12-17 | Stop reason: SDUPTHER

## 2019-08-06 NOTE — PROGRESS NOTES
Subjective   Yasmin Singleton is a 33 y.o. 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 No concern for STDs  Prenatal- taking  She does see a dentist   Her diet is described as: she has ups and downs.  - trouble with carbs  She describes her alcohol intake as:   She does use tobacco- started few months ago- 1/2 ppd- trying to quit- would like to try on her own  Her cardiovascular risk is: high  She does wear a seatbelt regularly.      Blood sugars are ranging between 115-165.  Is also lost some weight since last visit.  Having some situational depression-no suicidal ideation.  Does not want start medication.  Feels that she is dealing with it on her own well.    She does have intermittent knee pain- started as the left- then switched to the right- was told it was tendonitits- occasionally sweels and gives out-  Pain is not daily.    Review of Systems   Constitutional: Negative for activity change, appetite change, chills, fatigue, fever, unexpected weight gain and unexpected weight loss.   HENT: Negative for congestion, ear pain, nosebleeds, postnasal drip, sore throat, trouble swallowing and voice change.    Eyes: Negative for blurred vision, double vision, pain, itching and visual disturbance.   Respiratory: Negative for cough, chest tightness and shortness of breath.    Cardiovascular: Negative for chest pain, palpitations and leg swelling.   Gastrointestinal: Negative for blood in stool, diarrhea, nausea and vomiting.   Endocrine: Negative for cold intolerance, heat intolerance, polydipsia, polyphagia and polyuria.   Genitourinary: Positive for menstrual problem. Negative for dysuria, flank pain, frequency, genital sores, hematuria and urgency.        PCOS    Musculoskeletal: Positive for arthralgias. Negative for myalgias.   Skin: Negative for rash and skin lesions.   Allergic/Immunologic: Negative for environmental allergies, food allergies and immunocompromised state.   Neurological: Negative for dizziness, seizures, syncope, facial asymmetry, weakness, light-headedness, headache, memory problem and confusion.   Psychiatric/Behavioral: Positive for depressed mood. Negative for self-injury, sleep disturbance and suicidal ideas. The patient is not nervous/anxious.        Past Medical History:   Diagnosis Date   • Diabetes (CMS/HCC)    • GERD (gastroesophageal reflux disease)    • History of recurrent UTIs    • Seasonal allergies      Family History   Problem Relation Age of Onset   • Cancer Father         Unsure what kind   • Hypertension Father    • Stroke Father    • Kidney failure Mother      Past Surgical History:   Procedure Laterality Date   • WISDOM TOOTH EXTRACTION  2015     Social History     Socioeconomic History   • Marital status: Single     Spouse name: Not on file   • Number of children: Not on file   • Years of education: Not on file   • Highest education level: Not on file   Tobacco Use   • Smoking status: Current Every Day Smoker     Packs/day: 0.50     Years: 12.00     Pack years: 6.00     Types: Cigarettes   • Smokeless tobacco: Never Used   Substance and Sexual Activity   • Alcohol use: Yes     Comment: on occasion, at most 1-2 drinks a month   • Drug use: Yes     Frequency: 4.0 times per week     Types: Marijuana   • Sexual activity: Yes     Partners: Male     Birth control/protection: None         Current Outpatient Medications:   •  Prenatal Vit-Fe Fumarate-FA (PRENATAL, CLASSIC, VITAMIN) 28-0.8 MG tablet tablet, Take  by mouth Daily., Disp: , Rfl:   •  Empagliflozin-metFORMIN HCl 5-1000 MG tablet, Take 1 tablet by mouth 2 (Two) Times a Day., Disp: 60 tablet, Rfl: 5  •  glucose blood (FREESTYLE LITE) test strip, 1 each by Other route 2 (Two)  "Times a Day. Use as instructed, Disp: 100 each, Rfl: 12  •  Insulin Pen Needle (PEN NEEDLES) 31G X 5 MM misc, 1.8 mL Daily., Disp: 100 each, Rfl: 2  •  Lancets (FREESTYLE) lancets, 1 each by Other route Daily. Use as instructed, Disp: 100 each, Rfl: 11  •  levothyroxine (SYNTHROID, LEVOTHROID) 75 MCG tablet, Take 1 tablet by mouth Daily., Disp: 90 tablet, Rfl: 1  •  Liraglutide (VICTOZA) 18 MG/3ML solution pen-injector injection, 1.8 mg daily, Disp: 3 mL, Rfl: 5  •  raNITIdine (ZANTAC) 150 MG tablet, Take 1 tablet by mouth 2 (Two) Times a Day., Disp: 60 tablet, Rfl: 5    Objective   Vitals:    08/06/19 1121   BP: 130/82   Pulse: 84   Resp: 20   Temp: 97.9 °F (36.6 °C)   TempSrc: Temporal   SpO2: 100%   Weight: 91.2 kg (201 lb)   Height: 160 cm (63\")   PainSc: 0-No pain     Body mass index is 35.61 kg/m².  Physical Exam   Constitutional: She is oriented to person, place, and time. She appears well-developed and well-nourished. She is cooperative. No distress.   HENT:   Head: Normocephalic.   Right Ear: Tympanic membrane and external ear normal.   Left Ear: Tympanic membrane and external ear normal.   Nose: Nose normal. Right sinus exhibits no maxillary sinus tenderness and no frontal sinus tenderness. Left sinus exhibits no maxillary sinus tenderness and no frontal sinus tenderness.   Mouth/Throat: Oropharynx is clear and moist and mucous membranes are normal. No oropharyngeal exudate.   Eyes: Conjunctivae and EOM are normal. Pupils are equal, round, and reactive to light. No scleral icterus.   Neck: Normal range of motion. Neck supple. Carotid bruit is not present. No neck rigidity. No tracheal deviation present. No thyroid mass and no thyromegaly present.   Cardiovascular: Normal rate, regular rhythm, normal heart sounds and intact distal pulses. Exam reveals no gallop and no friction rub.   No murmur heard.  Pulmonary/Chest: Effort normal and breath sounds normal. No respiratory distress. She has no wheezes. She " has no rales.   Abdominal: Soft. Normal appearance and bowel sounds are normal. She exhibits no distension and no mass. There is no hepatosplenomegaly. There is no tenderness. There is no rebound and no guarding. No hernia. Hernia confirmed negative in the right inguinal area and confirmed negative in the left inguinal area.   Genitourinary: No labial fusion. There is no rash, tenderness, lesion or injury on the right labia. There is no rash, tenderness, lesion or injury on the left labia. Cervix exhibits discharge. Cervix exhibits no motion tenderness and no friability. No erythema, tenderness or bleeding in the vagina. No foreign body in the vagina. No signs of injury around the vagina. No vaginal discharge found.   Musculoskeletal: Normal range of motion. She exhibits no edema, tenderness or deformity.        Right knee: Normal.        Left knee: Normal.   ROM normal with all major joints   Lymphadenopathy:        Head (right side): No submental, no submandibular, no tonsillar, no preauricular, no posterior auricular and no occipital adenopathy present.        Head (left side): No submental, no submandibular, no tonsillar, no preauricular, no posterior auricular and no occipital adenopathy present.     She has no cervical adenopathy.        Right cervical: No superficial cervical, no deep cervical and no posterior cervical adenopathy present.       Left cervical: No superficial cervical, no deep cervical and no posterior cervical adenopathy present. No inguinal adenopathy noted on the right or left side.   Neurological: She is alert and oriented to person, place, and time. She displays no atrophy and no tremor. No cranial nerve deficit or sensory deficit. She exhibits normal muscle tone. Coordination and gait normal. GCS eye subscore is 4. GCS verbal subscore is 5. GCS motor subscore is 6.   Skin: Skin is warm and dry. Capillary refill takes 2 to 3 seconds. No rash noted. She is not diaphoretic. No cyanosis.  Nails show no clubbing.   Psychiatric: She has a normal mood and affect. Her speech is normal and behavior is normal. Judgment and thought content normal. Cognition and memory are normal.   Nursing note and vitals reviewed.      Assessment/Plan   Problem List Items Addressed This Visit     None      Visit Diagnoses     Annual physical exam    -  Primary    Relevant Orders    CBC & Differential    Comprehensive Metabolic Panel    Hemoglobin A1c    Lipid Panel    TSH    Vitamin D 25 Hydroxy    HIV-1 / O / 2 Ag / Antibody 4th Generation    Chronic pain of both knees        Relevant Orders    XR Knee 1 or 2 View Bilateral    Situational depression        Tobacco abuse            Yasmin was seen today for annual exam.    Diagnoses and all orders for this visit:    Annual physical exam  -     CBC & Differential  -     Comprehensive Metabolic Panel  -     Hemoglobin A1c  -     Lipid Panel  -     TSH  -     Vitamin D 25 Hydroxy  -     HIV-1 / O / 2 Ag / Antibody 4th Generation  - Pap with HPV and STD screen  Counseled regarding: age-appropriate screening labs and tests, wearing seatbelt and sunscreen regularly  Discussed: regular exercise and diet changes to promote healthy weight, checking skin regularly for abnormal moles and lesions    Chronic pain of both knees  -     XR Knee 1 or 2 View Bilateral; Future        Normal exam.  Recommend turmeric for pain.  Situational depression  Discussed treatment options.  Patient would like to deal with this on her own.  Advised that if she changes her mind may call in Wellbutrin as this may help her quit smoking and maintain her weight.  Tobacco abuse             The patient was counseled regarding diagnostic results, impressions, prognosis, instructions for management, risk factor reductions, education, and importance of treatment compliance.  The patient verbalized understanding of and agreement with the plan of care.    Advised patient to call with any further questions and any  new or worsening symptoms.     Return in about 3 months (around 11/6/2019).      JAIMIE Khan    Please note that portions of this note were completed with a voice recognition program. Efforts were made to edit the dictations, but occasionally words are mistranscribed.

## 2019-08-07 ENCOUNTER — HOSPITAL ENCOUNTER (OUTPATIENT)
Dept: GENERAL RADIOLOGY | Facility: HOSPITAL | Age: 34
Discharge: HOME | End: 2019-08-07
Admitting: NURSE PRACTITIONER

## 2019-08-07 DIAGNOSIS — M25.561 CHRONIC PAIN OF BOTH KNEES: ICD-10-CM

## 2019-08-07 DIAGNOSIS — E55.9 VITAMIN D DEFICIENCY: Primary | ICD-10-CM

## 2019-08-07 DIAGNOSIS — G89.29 CHRONIC PAIN OF BOTH KNEES: ICD-10-CM

## 2019-08-07 DIAGNOSIS — I51.7 MILD CONCENTRIC LEFT VENTRICULAR HYPERTROPHY (LVH): ICD-10-CM

## 2019-08-07 DIAGNOSIS — E11.9 TYPE 2 DIABETES MELLITUS WITHOUT COMPLICATION, WITHOUT LONG-TERM CURRENT USE OF INSULIN (HCC): ICD-10-CM

## 2019-08-07 DIAGNOSIS — M25.562 CHRONIC PAIN OF BOTH KNEES: ICD-10-CM

## 2019-08-07 LAB
25(OH)D3+25(OH)D2 SERPL-MCNC: 14.3 NG/ML (ref 30–100)
ALBUMIN SERPL-MCNC: 4.6 G/DL (ref 3.5–5.2)
ALBUMIN/GLOB SERPL: 1.6 G/DL
ALP SERPL-CCNC: 79 U/L (ref 39–117)
ALT SERPL-CCNC: 26 U/L (ref 1–33)
AST SERPL-CCNC: 18 U/L (ref 1–32)
BASOPHILS # BLD AUTO: 0.04 10*3/MM3 (ref 0–0.2)
BASOPHILS NFR BLD AUTO: 0.4 % (ref 0–1.5)
BILIRUB SERPL-MCNC: 0.4 MG/DL (ref 0.2–1.2)
BUN SERPL-MCNC: 7 MG/DL (ref 6–20)
BUN/CREAT SERPL: 10 (ref 7–25)
CALCIUM SERPL-MCNC: 9.3 MG/DL (ref 8.6–10.5)
CHLORIDE SERPL-SCNC: 102 MMOL/L (ref 98–107)
CHOLEST SERPL-MCNC: 163 MG/DL (ref 0–200)
CO2 SERPL-SCNC: 21.1 MMOL/L (ref 22–29)
CREAT SERPL-MCNC: 0.7 MG/DL (ref 0.57–1)
EOSINOPHIL # BLD AUTO: 0.34 10*3/MM3 (ref 0–0.4)
EOSINOPHIL NFR BLD AUTO: 3.7 % (ref 0.3–6.2)
ERYTHROCYTE [DISTWIDTH] IN BLOOD BY AUTOMATED COUNT: 15.1 % (ref 12.3–15.4)
GLOBULIN SER CALC-MCNC: 2.8 GM/DL
GLUCOSE SERPL-MCNC: 183 MG/DL (ref 65–99)
HBA1C MFR BLD: 8.1 % (ref 4.8–5.6)
HCT VFR BLD AUTO: 45.8 % (ref 34–46.6)
HDLC SERPL-MCNC: 40 MG/DL (ref 40–60)
HGB BLD-MCNC: 14.2 G/DL (ref 12–15.9)
HIV 1+2 AB+HIV1 P24 AG SERPL QL IA: NON REACTIVE
IMM GRANULOCYTES # BLD AUTO: 0.03 10*3/MM3 (ref 0–0.05)
IMM GRANULOCYTES NFR BLD AUTO: 0.3 % (ref 0–0.5)
LDLC SERPL CALC-MCNC: 102 MG/DL (ref 0–100)
LYMPHOCYTES # BLD AUTO: 3.46 10*3/MM3 (ref 0.7–3.1)
LYMPHOCYTES NFR BLD AUTO: 37.3 % (ref 19.6–45.3)
MCH RBC QN AUTO: 28.1 PG (ref 26.6–33)
MCHC RBC AUTO-ENTMCNC: 31 G/DL (ref 31.5–35.7)
MCV RBC AUTO: 90.7 FL (ref 79–97)
MONOCYTES # BLD AUTO: 0.61 10*3/MM3 (ref 0.1–0.9)
MONOCYTES NFR BLD AUTO: 6.6 % (ref 5–12)
NEUTROPHILS # BLD AUTO: 4.79 10*3/MM3 (ref 1.7–7)
NEUTROPHILS NFR BLD AUTO: 51.7 % (ref 42.7–76)
NRBC BLD AUTO-RTO: 0 /100 WBC (ref 0–0.2)
PLATELET # BLD AUTO: 283 10*3/MM3 (ref 140–450)
POTASSIUM SERPL-SCNC: 4.2 MMOL/L (ref 3.5–5.2)
PROT SERPL-MCNC: 7.4 G/DL (ref 6–8.5)
RBC # BLD AUTO: 5.05 10*6/MM3 (ref 3.77–5.28)
SODIUM SERPL-SCNC: 141 MMOL/L (ref 136–145)
TRIGL SERPL-MCNC: 103 MG/DL (ref 0–150)
TSH SERPL DL<=0.005 MIU/L-ACNC: 1.98 UIU/ML (ref 0.45–4.5)
VLDLC SERPL CALC-MCNC: 20.6 MG/DL
WBC # BLD AUTO: 9.27 10*3/MM3 (ref 3.4–10.8)

## 2019-08-07 PROCEDURE — 73560 X-RAY EXAM OF KNEE 1 OR 2: CPT

## 2019-08-07 RX ORDER — ERGOCALCIFEROL 1.25 MG/1
50000 CAPSULE ORAL WEEKLY
Qty: 4 CAPSULE | Refills: 3 | Status: SHIPPED | OUTPATIENT
Start: 2019-08-07 | End: 2020-03-02

## 2019-08-12 ENCOUNTER — TELEPHONE (OUTPATIENT)
Dept: INTERNAL MEDICINE | Facility: CLINIC | Age: 34
End: 2019-08-12

## 2019-08-12 PROBLEM — M77.9 BONE SPUR OF OTHER SITE: Status: ACTIVE | Noted: 2019-08-12

## 2019-08-12 PROBLEM — R87.820 CERVICAL LOW RISK HUMAN PAPILLOMAVIRUS (HPV) DNA TEST POSITIVE: Status: ACTIVE | Noted: 2019-08-12

## 2019-08-16 ENCOUNTER — TELEPHONE (OUTPATIENT)
Dept: INTERNAL MEDICINE | Facility: CLINIC | Age: 34
End: 2019-08-16

## 2019-08-16 NOTE — TELEPHONE ENCOUNTER
----- Message from JAIMIE Khan sent at 8/16/2019  1:44 PM EDT -----  Can you let her know that her insurance does not want to pay for Synjardy, I changed her to Invokanamet  ----- Message -----  From: Bernadette Polk MA  Sent: 8/16/2019  12:50 PM  To: JAIMIE Khan        ----- Message -----  From: Mj Coelho, Jo Ann Rodriges  Sent: 8/16/2019  11:45 AM  To: Bernadette Polk MA

## 2019-09-09 ENCOUNTER — TELEPHONE (OUTPATIENT)
Dept: INTERNAL MEDICINE | Facility: CLINIC | Age: 34
End: 2019-09-09

## 2019-09-09 NOTE — TELEPHONE ENCOUNTER
PA was sent through [facility] aug 16th and 27th. [employer] [facility]. Stated addition info was needed . I didn't receive anything through fax or M States the approval should be in by tomorrow.Steglatro and metformin must be tried before the approval of Invokamet.

## 2019-09-19 DIAGNOSIS — E11.9 TYPE 2 DIABETES MELLITUS WITHOUT COMPLICATION, WITHOUT LONG-TERM CURRENT USE OF INSULIN (HCC): ICD-10-CM

## 2019-09-19 DIAGNOSIS — I51.7 MILD CONCENTRIC LEFT VENTRICULAR HYPERTROPHY (LVH): Primary | ICD-10-CM

## 2019-11-21 ENCOUNTER — OFFICE VISIT (OUTPATIENT)
Dept: INTERNAL MEDICINE | Facility: CLINIC | Age: 34
End: 2019-11-21

## 2019-11-21 VITALS
RESPIRATION RATE: 20 BRPM | OXYGEN SATURATION: 99 % | DIASTOLIC BLOOD PRESSURE: 90 MMHG | SYSTOLIC BLOOD PRESSURE: 132 MMHG | WEIGHT: 206 LBS | HEIGHT: 63 IN | BODY MASS INDEX: 36.5 KG/M2 | HEART RATE: 72 BPM | TEMPERATURE: 97.8 F

## 2019-11-21 DIAGNOSIS — E11.9 TYPE 2 DIABETES MELLITUS WITHOUT COMPLICATION, WITHOUT LONG-TERM CURRENT USE OF INSULIN (HCC): Primary | ICD-10-CM

## 2019-11-21 DIAGNOSIS — F51.01 PRIMARY INSOMNIA: ICD-10-CM

## 2019-11-21 DIAGNOSIS — Z23 NEED FOR INFLUENZA VACCINATION: ICD-10-CM

## 2019-11-21 DIAGNOSIS — I51.7 MILD CONCENTRIC LEFT VENTRICULAR HYPERTROPHY (LVH): ICD-10-CM

## 2019-11-21 DIAGNOSIS — I10 ESSENTIAL HYPERTENSION: ICD-10-CM

## 2019-11-21 DIAGNOSIS — B02.23 SHINGLES (HERPES ZOSTER) POLYNEUROPATHY: ICD-10-CM

## 2019-11-21 DIAGNOSIS — L73.2 HIDRADENITIS AXILLARIS: ICD-10-CM

## 2019-11-21 LAB — HBA1C MFR BLD: 7.7 %

## 2019-11-21 PROCEDURE — 90471 IMMUNIZATION ADMIN: CPT | Performed by: NURSE PRACTITIONER

## 2019-11-21 PROCEDURE — 99214 OFFICE O/P EST MOD 30 MIN: CPT | Performed by: NURSE PRACTITIONER

## 2019-11-21 PROCEDURE — 83036 HEMOGLOBIN GLYCOSYLATED A1C: CPT | Performed by: NURSE PRACTITIONER

## 2019-11-21 PROCEDURE — 90674 CCIIV4 VAC NO PRSV 0.5 ML IM: CPT | Performed by: NURSE PRACTITIONER

## 2019-11-21 RX ORDER — CHLORHEXIDINE GLUCONATE 4 G/100ML
SOLUTION TOPICAL EVERY OTHER DAY
Qty: 473 ML | Refills: 5 | Status: SHIPPED | OUTPATIENT
Start: 2019-11-21 | End: 2019-12-02 | Stop reason: SDUPTHER

## 2019-11-21 RX ORDER — TRAZODONE HYDROCHLORIDE 50 MG/1
50 TABLET ORAL NIGHTLY
Qty: 30 TABLET | Refills: 2 | Status: SHIPPED | OUTPATIENT
Start: 2019-11-21 | End: 2019-12-19 | Stop reason: SDUPTHER

## 2019-11-21 RX ORDER — GABAPENTIN 100 MG/1
100 CAPSULE ORAL 3 TIMES DAILY PRN
Qty: 90 CAPSULE | Refills: 0 | Status: SHIPPED | OUTPATIENT
Start: 2019-11-21 | End: 2020-03-02

## 2019-11-21 RX ORDER — METHYLPREDNISOLONE 4 MG/1
TABLET ORAL
Qty: 21 EACH | Refills: 0 | Status: SHIPPED | OUTPATIENT
Start: 2019-11-21 | End: 2019-12-19

## 2019-11-21 RX ORDER — ATENOLOL 25 MG/1
25 TABLET ORAL DAILY
Qty: 30 TABLET | Refills: 2 | Status: SHIPPED | OUTPATIENT
Start: 2019-11-21 | End: 2020-03-10

## 2019-11-21 NOTE — PROGRESS NOTES
"Subjective   Yasmin Singleton is a 34 y.o. female here today for herpes zoster.    Chief Complaint   Patient presents with   • Herpes Zoster     pt was DX with shingles on Tuesday complains of neck pain and headache   • Diabetes   Yasmin is here today for follow-up on diabetes, hypertension, and to discuss insomnia and shingles.  She has been compliant with her medication and denies adverse side effects.  A1c today is 7.7.  She does report she was doing well and has lost weight but has gained some of it back.  Has been snacking more.  She had previously not been on ACE inhibitor as she is trying to conceive.  Blood pressure is elevated today, however.  She has had her diabetic foot and eye exam.  She has had a urine microalbumin screen.  She has had her pneumonia vaccine.  Her last echo reveals mild concentric ventricular hypertrophy-again ACE contraindicated while trying to conceive.    She does report being diagnosed with shingles at the urgent care yesterday.  She has been having severe facial and neck pain.  There is an ulceration of her tongue and it is causing her difficulty swallowing.  It is causing a severe headache as well and she feels she can hardly concentrate.  When she was at urgent care she was given acyclovir.  She has had some improvement.    She also reports she has been having trouble sleeping off and on for months.  She has not tried anything over-the-counter for this.  She denies anxiety at night.  She just has trouble falling asleep.    She also reports getting recurrent \"pimples\" in bilateral axilla.    Review of Systems   Constitutional: Positive for fatigue. Negative for activity change, appetite change, unexpected weight gain and unexpected weight loss.   Eyes: Negative for blurred vision, double vision and pain.   Respiratory: Negative for chest tightness and shortness of breath.    Cardiovascular: Negative for chest pain and leg swelling.   Gastrointestinal: Negative for diarrhea, nausea and " vomiting.   Endocrine: Negative for cold intolerance, heat intolerance, polydipsia, polyphagia and polyuria.   Skin: Positive for rash and skin lesions.   Neurological: Negative for dizziness, seizures, syncope, facial asymmetry, weakness, light-headedness, headache, memory problem and confusion.   Psychiatric/Behavioral: Positive for sleep disturbance. Negative for depressed mood. The patient is not nervous/anxious.        Past Medical History:   Diagnosis Date   • Diabetes (CMS/HCC)    • GERD (gastroesophageal reflux disease)    • History of recurrent UTIs    • Seasonal allergies      Family History   Problem Relation Age of Onset   • Cancer Father         Unsure what kind   • Hypertension Father    • Stroke Father    • Kidney failure Mother      Past Surgical History:   Procedure Laterality Date   • WISDOM TOOTH EXTRACTION  2015     Social History     Socioeconomic History   • Marital status: Single     Spouse name: Not on file   • Number of children: Not on file   • Years of education: Not on file   • Highest education level: Not on file   Tobacco Use   • Smoking status: Current Every Day Smoker     Packs/day: 0.50     Years: 12.00     Pack years: 6.00     Types: Cigarettes   • Smokeless tobacco: Never Used   Substance and Sexual Activity   • Alcohol use: Yes     Comment: on occasion, at most 1-2 drinks a month   • Drug use: Yes     Frequency: 4.0 times per week     Types: Marijuana   • Sexual activity: Yes     Partners: Male     Birth control/protection: None         Current Outpatient Medications:   •  acyclovir (ZOVIRAX) 800 MG tablet, Take 1 tablet by mouth 5 (Five) Times a Day for 7 days. Take no more than 5 doses a day., Disp: 35 tablet, Rfl: 0  •  glucose blood (FREESTYLE LITE) test strip, 1 each by Other route 2 (Two) Times a Day. Use as instructed, Disp: 100 each, Rfl: 12  •  Insulin Pen Needle (PEN NEEDLES) 31G X 5 MM misc, 1.8 mL Daily., Disp: 100 each, Rfl: 2  •  Lancets (FREESTYLE) lancets, 1 each  "by Other route Daily. Use as instructed, Disp: 100 each, Rfl: 11  •  levothyroxine (SYNTHROID, LEVOTHROID) 75 MCG tablet, Take 1 tablet by mouth Daily., Disp: 90 tablet, Rfl: 1  •  Liraglutide (VICTOZA) 18 MG/3ML solution pen-injector injection, 1.8 mg daily, Disp: 3 mL, Rfl: 5  •  Prenatal Vit-Fe Fumarate-FA (PRENATAL, CLASSIC, VITAMIN) 28-0.8 MG tablet tablet, Take  by mouth Daily., Disp: , Rfl:   •  raNITIdine (ZANTAC) 150 MG tablet, Take 1 tablet by mouth 2 (Two) Times a Day., Disp: 60 tablet, Rfl: 5  •  vitamin D (ERGOCALCIFEROL) 01885 units capsule capsule, Take 1 capsule by mouth 1 (One) Time Per Week., Disp: 4 capsule, Rfl: 3  •  atenolol (TENORMIN) 25 MG tablet, Take 1 tablet by mouth Daily., Disp: 30 tablet, Rfl: 2  •  Benzocaine 10 MG lozenge, Dissolve 1 lozenge in the mouth 4 (Four) Times a Day., Disp: 1 each, Rfl: 2  •  chlorhexidine (HIBICLENS) 4 % external liquid, Apply  topically to the appropriate area as directed Every Other Day., Disp: 473 mL, Rfl: 5  •  Ertugliflozin-metFORMIN HCl 7.5-1000 MG tablet, Take 1 tablet by mouth 2 (Two) Times a Day., Disp: 60 tablet, Rfl: 2  •  gabapentin (NEURONTIN) 100 MG capsule, Take 1 capsule by mouth 3 (Three) Times a Day As Needed (nerve pain)., Disp: 90 capsule, Rfl: 0  •  methylPREDNISolone (MEDROL, ELLIOT,) 4 MG tablet, Take as directed on package instructions., Disp: 21 each, Rfl: 0  •  traZODone (DESYREL) 50 MG tablet, Take 1 tablet by mouth Every Night., Disp: 30 tablet, Rfl: 2    Objective   Vitals:    11/21/19 0904   BP: 132/90   Pulse: 72   Resp: 20   Temp: 97.8 °F (36.6 °C)   TempSrc: Temporal   SpO2: 99%   Weight: 93.4 kg (206 lb)   Height: 160 cm (63\")     Body mass index is 36.49 kg/m².  Physical Exam   Constitutional: She is oriented to person, place, and time. She appears well-developed and well-nourished. No distress.   HENT:   Mouth/Throat:       Eyes: Pupils are equal, round, and reactive to light.   Neck: Neck supple. No thyromegaly present. "   Cardiovascular: Normal rate and regular rhythm.   Pulmonary/Chest: Effort normal and breath sounds normal.   Neurological: She is alert and oriented to person, place, and time.   Skin: Skin is warm and dry. Capillary refill takes 2 to 3 seconds. She is not diaphoretic.        Psychiatric: She has a normal mood and affect. Her behavior is normal. Judgment and thought content normal.   Nursing note and vitals reviewed.      Assessment/Plan   Problem List Items Addressed This Visit        Cardiovascular and Mediastinum    Mild concentric left ventricular hypertrophy (LVH)    Relevant Medications    atenolol (TENORMIN) 25 MG tablet       Endocrine    Type 2 diabetes mellitus without complication, without long-term current use of insulin (CMS/[facility]) - Primary    Relevant Medications    Lancets (FREESTYLE) lancets    glucose blood (FREESTYLE LITE) test strip    Liraglutide (VICTOZA) 18 MG/3ML solution pen-injector injection    Insulin Pen Needle (PEN NEEDLES) 31G X 5 MM misc    Ertugliflozin-metFORMIN HCl 7.5-1000 MG tablet    Other Relevant Orders    POC Glycosylated Hemoglobin (Hb A1C) (Completed)      Other Visit Diagnoses     Shingles (herpes zoster) polyneuropathy        Relevant Medications    methylPREDNISolone (MEDROL, ELLIOT,) 4 MG tablet    gabapentin (NEURONTIN) 100 MG capsule    Benzocaine 10 MG lozenge    Essential hypertension        Relevant Medications    atenolol (TENORMIN) 25 MG tablet    Primary insomnia        Relevant Medications    traZODone (DESYREL) 50 MG tablet    Hidradenitis axillaris        Relevant Medications    chlorhexidine (HIBICLENS) 4 % external liquid    Need for influenza vaccination        Relevant Orders    Flucelvax Quad=>4Years ([id number]) (Completed)        Yasmin was seen today for herpes zoster and diabetes.    Diagnoses and all orders for this visit:    Type 2 diabetes mellitus without complication, without long-term current use of insulin (CMS/[facility])  -     POC Glycosylated  Hemoglobin (Hb A1C)  -     Ertugliflozin-metFORMIN HCl 7.5-1000 MG tablet; Take 1 tablet by mouth 2 (Two) Times a Day.  Will increase Ertugliflozin dose and continue with diet and exercise plan  Shingles (herpes zoster) polyneuropathy  -     methylPREDNISolone (MEDROL, ELLIOT,) 4 MG tablet; Take as directed on package instructions.  -     gabapentin (NEURONTIN) 100 MG capsule; Take 1 capsule by mouth 3 (Three) Times a Day As Needed (nerve pain).  -     Benzocaine 10 MG lozenge; Dissolve 1 lozenge in the mouth 4 (Four) Times a Day.        -    We will prescribe benzocaine lozenges so that she may eat.  Prescribe Neurontin for pain.  Discussed side effects of medication.  Will try steroids as well.  Discussed etiology and symptoms of shingles as well as typical course.  Mild concentric left ventricular hypertrophy (LVH)  Continue to manage diabetes and attempt to control blood pressure and weight  Essential hypertension  -     atenolol (TENORMIN) 25 MG tablet; Take 1 tablet by mouth Daily.  Will start atenolol for hypertension-follow-up in 4 weeks  Primary insomnia  -     traZODone (DESYREL) 50 MG tablet; Take 1 tablet by mouth Every Night.  Recommend she try melatonin over-the-counter and if this is not efficacious to try trazodone-follow-up in 4 weeks  Hidradenitis axillaris  -     chlorhexidine (HIBICLENS) 4 % external liquid; Apply  topically to the appropriate area as directed Every Other Day.    Need for influenza vaccination  -     Flucelvax Quad=>4Years ([id number])             The patient was counseled regarding diagnostic results, impressions, prognosis, instructions for management, risk factor reductions, education, and importance of treatment compliance.  The patient verbalized understanding of and agreement with the plan of care.    Advised patient to call with any further questions and any new or worsening symptoms.     Return in about 4 weeks (around 12/19/2019) for Recheck.      Yasmin Hanna,  APRN    Please note that portions of this note were completed with a voice recognition program. Efforts were made to edit the dictations, but occasionally words are mistranscribed.

## 2019-11-22 ENCOUNTER — TELEPHONE (OUTPATIENT)
Dept: INTERNAL MEDICINE | Facility: CLINIC | Age: 34
End: 2019-11-22

## 2019-11-22 DIAGNOSIS — B02.23 SHINGLES (HERPES ZOSTER) POLYNEUROPATHY: ICD-10-CM

## 2019-11-22 NOTE — TELEPHONE ENCOUNTER
PT NEEDS BENZOCAINE SENT BACK IN,IT WAS NOT RECEIVED BY THE PHARMACY. ALSO HER HIBICLENS WAS NOT COVERED BY HER INSURANCE, THE PHARMACY IS SUPPOSED TO BE SENDING A PA REQUEST.

## 2019-12-02 ENCOUNTER — TELEPHONE (OUTPATIENT)
Dept: INTERNAL MEDICINE | Facility: CLINIC | Age: 34
End: 2019-12-02

## 2019-12-02 DIAGNOSIS — B02.23 SHINGLES (HERPES ZOSTER) POLYNEUROPATHY: ICD-10-CM

## 2019-12-02 DIAGNOSIS — L73.2 HIDRADENITIS AXILLARIS: ICD-10-CM

## 2019-12-02 DIAGNOSIS — R12 HEARTBURN: Primary | ICD-10-CM

## 2019-12-02 RX ORDER — FAMOTIDINE 20 MG/1
20 TABLET, FILM COATED ORAL 2 TIMES DAILY PRN
Qty: 60 TABLET | Refills: 2 | Status: SHIPPED | OUTPATIENT
Start: 2019-12-02 | End: 2020-03-09

## 2019-12-02 RX ORDER — CHLORHEXIDINE GLUCONATE 4 G/100ML
SOLUTION TOPICAL EVERY OTHER DAY
Qty: 473 ML | Refills: 5 | Status: SHIPPED | OUTPATIENT
Start: 2019-12-02 | End: 2021-02-16

## 2019-12-02 NOTE — TELEPHONE ENCOUNTER
Pt called stating she only took 4 tablets of the 7 of acyclovir because she lost them for over a week. should she finish it? Also she is wanting to know if you can send in a different gerd medication since Rantidine is recalled? I will resend in the othe medications .

## 2019-12-02 NOTE — TELEPHONE ENCOUNTER
No, does not need to take the antivirals now, it won't work now.  I will switch her to pepcid instead of zantac.

## 2019-12-02 NOTE — TELEPHONE ENCOUNTER
BLAIR, PLEASE CALL KARLA BACK.. SHE SAID THAT A PRESCRIPTION WASN'T CALLED IN AND SHE NEEDS TO TALK TO YOU. [phone]

## 2019-12-03 ENCOUNTER — TELEPHONE (OUTPATIENT)
Dept: INTERNAL MEDICINE | Facility: CLINIC | Age: 34
End: 2019-12-03

## 2019-12-03 NOTE — TELEPHONE ENCOUNTER
Laura is calling to advise they can't fill the Benzocaine lozenge. They have to be sent to a compound pharmacy.

## 2019-12-19 ENCOUNTER — OFFICE VISIT (OUTPATIENT)
Dept: INTERNAL MEDICINE | Facility: CLINIC | Age: 34
End: 2019-12-19

## 2019-12-19 VITALS
OXYGEN SATURATION: 99 % | HEIGHT: 63 IN | RESPIRATION RATE: 16 BRPM | SYSTOLIC BLOOD PRESSURE: 124 MMHG | HEART RATE: 84 BPM | TEMPERATURE: 98.2 F | BODY MASS INDEX: 35.97 KG/M2 | DIASTOLIC BLOOD PRESSURE: 80 MMHG | WEIGHT: 203 LBS

## 2019-12-19 DIAGNOSIS — E11.9 TYPE 2 DIABETES MELLITUS WITHOUT COMPLICATION, WITHOUT LONG-TERM CURRENT USE OF INSULIN (HCC): Primary | ICD-10-CM

## 2019-12-19 DIAGNOSIS — F51.01 PRIMARY INSOMNIA: ICD-10-CM

## 2019-12-19 DIAGNOSIS — I10 ESSENTIAL HYPERTENSION: ICD-10-CM

## 2019-12-19 DIAGNOSIS — H01.004 BLEPHARITIS OF LEFT UPPER EYELID, UNSPECIFIED TYPE: ICD-10-CM

## 2019-12-19 PROCEDURE — 99214 OFFICE O/P EST MOD 30 MIN: CPT | Performed by: NURSE PRACTITIONER

## 2019-12-19 RX ORDER — ERYTHROMYCIN 5 MG/G
OINTMENT OPHTHALMIC 3 TIMES DAILY
Qty: 3.5 G | Refills: 0 | Status: SHIPPED | OUTPATIENT
Start: 2019-12-19 | End: 2020-03-02

## 2019-12-19 RX ORDER — TRAZODONE HYDROCHLORIDE 150 MG/1
150 TABLET ORAL NIGHTLY
Qty: 30 TABLET | Refills: 2 | Status: SHIPPED | OUTPATIENT
Start: 2019-12-19 | End: 2020-06-09

## 2019-12-19 NOTE — PROGRESS NOTES
Subjective   Yasmin Singleton is a 34 y.o. female here today for HTN/ insomnia.    Chief Complaint   Patient presents with   • Diabetes     glucose 147 after eating yesterday.   Last visit Yasmin was started on atenolol for HTN (trying to concieve) and trazodone for insomnia.  Her blood pressure has improved and she denies any adverse side effects with atenolol.  She has not seen any improvement in insomnia with the trazodone. Blood sugars improved with increasing ertugliflozin.    She also reports her left eye chronically itches and there is a bump on her eyelid.  This is been present for several years.  She is wondering if she could be allergic to something.  She does not wear eye make-up.    The Hibiclens may be helping somewhat with her HS.  Shingles has improved.        Review of Systems   Constitutional: Positive for fatigue. Negative for activity change, appetite change, unexpected weight gain and unexpected weight loss.   Eyes: Positive for itching. Negative for blurred vision, double vision, pain, discharge and redness.   Respiratory: Negative for chest tightness and shortness of breath.    Cardiovascular: Negative for chest pain and leg swelling.   Gastrointestinal: Negative for diarrhea, nausea and vomiting.   Endocrine: Negative for cold intolerance, heat intolerance, polydipsia, polyphagia and polyuria.   Skin: Negative for rash and skin lesions.   Neurological: Negative for dizziness, seizures, syncope, facial asymmetry, weakness, light-headedness, headache, memory problem and confusion.   Psychiatric/Behavioral: Positive for sleep disturbance. Negative for depressed mood. The patient is not nervous/anxious.        Past Medical History:   Diagnosis Date   • Diabetes (CMS/[facility])    • GERD (gastroesophageal reflux disease)    • History of recurrent UTIs    • Seasonal allergies      Family History   Problem Relation Age of Onset   • Cancer Father         Unsure what kind   • Hypertension Father    • Stroke  Father    • Kidney failure Mother      Past Surgical History:   Procedure Laterality Date   • WISDOM TOOTH EXTRACTION  2015     Social History     Socioeconomic History   • Marital status: Single     Spouse name: Not on file   • Number of children: Not on file   • Years of education: Not on file   • Highest education level: Not on file   Tobacco Use   • Smoking status: Current Every Day Smoker     Packs/day: 0.50     Years: 12.00     Pack years: 6.00     Types: Cigarettes   • Smokeless tobacco: Never Used   Substance and Sexual Activity   • Alcohol use: Yes     Comment: on occasion, at most 1-2 drinks a month   • Drug use: Yes     Frequency: 4.0 times per week     Types: Marijuana   • Sexual activity: Yes     Partners: Male     Birth control/protection: None         Current Outpatient Medications:   •  atenolol (TENORMIN) 25 MG tablet, Take 1 tablet by mouth Daily., Disp: 30 tablet, Rfl: 2  •  chlorhexidine (HIBICLENS) 4 % external liquid, Apply  topically to the appropriate area as directed Every Other Day., Disp: 473 mL, Rfl: 5  •  Ertugliflozin-metFORMIN HCl 7.5-1000 MG tablet, Take 1 tablet by mouth 2 (Two) Times a Day., Disp: 60 tablet, Rfl: 2  •  famotidine (PEPCID) 20 MG tablet, Take 1 tablet by mouth 2 (Two) Times a Day As Needed for Heartburn., Disp: 60 tablet, Rfl: 2  •  gabapentin (NEURONTIN) 100 MG capsule, Take 1 capsule by mouth 3 (Three) Times a Day As Needed (nerve pain)., Disp: 90 capsule, Rfl: 0  •  glucose blood (FREESTYLE LITE) test strip, 1 each by Other route 2 (Two) Times a Day. Use as instructed, Disp: 100 each, Rfl: 12  •  Insulin Pen Needle (PEN NEEDLES) 31G X 5 MM misc, 1.8 mL Daily., Disp: 100 each, Rfl: 2  •  Lancets (FREESTYLE) lancets, 1 each by Other route Daily. Use as instructed, Disp: 100 each, Rfl: 11  •  levothyroxine (SYNTHROID, LEVOTHROID) 75 MCG tablet, Take 1 tablet by mouth Daily., Disp: 90 tablet, Rfl: 1  •  Prenatal Vit-Fe Fumarate-FA (PRENATAL, CLASSIC, VITAMIN) 28-0.8 MG  "tablet tablet, Take  by mouth Daily., Disp: , Rfl:   •  traZODone (DESYREL) 150 MG tablet, Take 1 tablet by mouth Every Night., Disp: 30 tablet, Rfl: 2  •  vitamin D (ERGOCALCIFEROL) 17200 units capsule capsule, Take 1 capsule by mouth 1 (One) Time Per Week., Disp: 4 capsule, Rfl: 3  •  erythromycin (ROMYCIN) 5 MG/GM ophthalmic ointment, Administer  into the left eye 3 (Three) Times a Day. For 7 days, Disp: 3.5 g, Rfl: 0  •  Semaglutide, 1 MG/DOSE, (OZEMPIC, 1 MG/DOSE,) 2 MG/1.5ML solution pen-injector, Inject 1 mg under the skin into the appropriate area as directed 1 (One) Time Per Week., Disp: 3 mL, Rfl: 5    Objective   Vitals:    12/19/19 0912   BP: 124/80   Pulse: 84   Resp: 16   Temp: 98.2 °F (36.8 °C)   TempSrc: Temporal   SpO2: 99%   Weight: 92.1 kg (203 lb)   Height: 160 cm (63\")     Body mass index is 35.96 kg/m².  Physical Exam   Constitutional: She is oriented to person, place, and time. She appears well-developed and well-nourished. No distress.   Eyes:       Pulmonary/Chest: Effort normal. No accessory muscle usage. No respiratory distress.   Neurological: She is alert and oriented to person, place, and time.   Skin: No erythema. No pallor.   Psychiatric: She has a normal mood and affect. Her speech is normal and behavior is normal. Judgment and thought content normal.   Nursing note and vitals reviewed.      Assessment/Plan   Problem List Items Addressed This Visit        Cardiovascular and Mediastinum    Essential hypertension    Relevant Medications    atenolol (TENORMIN) 25 MG tablet       Endocrine    Type 2 diabetes mellitus without complication, without long-term current use of insulin (CMS/[facility]) - Primary    Relevant Medications    Lancets (FREESTYLE) lancets    glucose blood (FREESTYLE LITE) test strip    Insulin Pen Needle (PEN NEEDLES) 31G X 5 MM misc    Ertugliflozin-metFORMIN HCl 7.5-1000 MG tablet    Semaglutide, 1 MG/DOSE, (OZEMPIC, 1 MG/DOSE,) 2 MG/1.5ML solution pen-injector      Other " Visit Diagnoses     Primary insomnia        Relevant Medications    traZODone (DESYREL) 150 MG tablet    Blepharitis of left upper eyelid, unspecified type        Relevant Medications    erythromycin (ROMYCIN) 5 MG/GM ophthalmic ointment        Yasmin was seen today for diabetes.    Diagnoses and all orders for this visit:    Type 2 diabetes mellitus without complication, without long-term current use of insulin (CMS/[facility])  -     Semaglutide, 1 MG/DOSE, (OZEMPIC, 1 MG/DOSE,) 2 MG/1.5ML solution pen-injector; Inject 1 mg under the skin into the appropriate area as directed 1 (One) Time Per Week.  Will change Victoza to Ozempic per formulary  Primary insomnia  -     traZODone (DESYREL) 150 MG tablet; Take 1 tablet by mouth Every Night.  Increased dose today  Blepharitis of left upper eyelid, unspecified type  -     erythromycin (ROMYCIN) 5 MG/GM ophthalmic ointment; Administer  into the left eye 3 (Three) Times a Day. For 7 days.  Recommend warm compress and dispose of any eye make-up  HTN       -stable and improved           The patient was counseled regarding diagnostic results, impressions, prognosis, instructions for management, risk factor reductions, education, and importance of treatment compliance.  The patient verbalized understanding of and agreement with the plan of care.    Advised patient to call with any further questions and any new or worsening symptoms.     Return in about 2 months (around 2/19/2020).      JAIMIE Khan    Please note that portions of this note were completed with a voice recognition program. Efforts were made to edit the dictations, but occasionally words are mistranscribed.

## 2020-01-23 DIAGNOSIS — E11.9 TYPE 2 DIABETES MELLITUS WITHOUT COMPLICATION, WITHOUT LONG-TERM CURRENT USE OF INSULIN (HCC): ICD-10-CM

## 2020-01-23 RX ORDER — LANCETS 28 GAUGE
EACH MISCELLANEOUS
Qty: 100 EACH | Refills: 10 | Status: SHIPPED | OUTPATIENT
Start: 2020-01-23 | End: 2020-03-03 | Stop reason: SDUPTHER

## 2020-03-02 ENCOUNTER — OFFICE VISIT (OUTPATIENT)
Dept: INTERNAL MEDICINE | Facility: CLINIC | Age: 35
End: 2020-03-02

## 2020-03-02 VITALS
TEMPERATURE: 98.7 F | DIASTOLIC BLOOD PRESSURE: 74 MMHG | HEART RATE: 88 BPM | BODY MASS INDEX: 36.86 KG/M2 | OXYGEN SATURATION: 99 % | WEIGHT: 208 LBS | SYSTOLIC BLOOD PRESSURE: 118 MMHG | RESPIRATION RATE: 16 BRPM | HEIGHT: 63 IN

## 2020-03-02 DIAGNOSIS — I51.7 MILD CONCENTRIC LEFT VENTRICULAR HYPERTROPHY (LVH): ICD-10-CM

## 2020-03-02 DIAGNOSIS — E11.9 TYPE 2 DIABETES MELLITUS WITHOUT COMPLICATION, WITHOUT LONG-TERM CURRENT USE OF INSULIN (HCC): ICD-10-CM

## 2020-03-02 DIAGNOSIS — E03.8 OTHER SPECIFIED HYPOTHYROIDISM: ICD-10-CM

## 2020-03-02 DIAGNOSIS — R74.8 ELEVATED LIVER ENZYMES: Primary | ICD-10-CM

## 2020-03-02 DIAGNOSIS — E55.9 VITAMIN D DEFICIENCY: ICD-10-CM

## 2020-03-02 DIAGNOSIS — E28.2 PCOS (POLYCYSTIC OVARIAN SYNDROME): ICD-10-CM

## 2020-03-02 DIAGNOSIS — I10 ESSENTIAL HYPERTENSION: ICD-10-CM

## 2020-03-02 DIAGNOSIS — Z13.220 LIPID SCREENING: ICD-10-CM

## 2020-03-02 DIAGNOSIS — L67.8 ABNORMAL FACIAL HAIR: ICD-10-CM

## 2020-03-02 PROBLEM — L73.2 HIDRADENITIS AXILLARIS: Status: ACTIVE | Noted: 2020-03-02

## 2020-03-02 PROCEDURE — 99214 OFFICE O/P EST MOD 30 MIN: CPT | Performed by: NURSE PRACTITIONER

## 2020-03-02 NOTE — PROGRESS NOTES
Subjective   Yasmin Singleton is a 34 y.o. female here today for dm.    Chief Complaint   Patient presents with   • Diabetes   • Arm Pain     knot under arm pain /breast pain    • Vitamin D Deficiency   Yasmin is here today to follow up on DM, PCOS, and HS. Last Hgb A1C 7.7% Her postprandial BG readings run between . Denies having any side effects from her antidiabetic medications. UTD on diabetic foot exam. Urine microalbumin done in office today. Annual dilated eye exam scheduled for 3-6-2020. Attributes 5 lb weight gain to an increase in soda consumption, averaging 2 bottle of pepsi/day. Not on an ACEI or mod. Intensity statin 2/2 trying to conceive.    Today in office, concerned about nodular lesions under bilateral axillae, specifically left. Stated that two weeks ago she picked a scab off and her armpit had been sore to touch. No discharge, swelling, discoloration, or erythema. No fever. Reports that symptoms are gone today. Has avoided putting deodorant on or shaving armpits to help relieve pain.     Review of Systems   Constitutional: Negative for activity change, appetite change, fatigue, unexpected weight gain and unexpected weight loss.   Eyes: Negative for blurred vision and double vision.   Respiratory: Negative for chest tightness and shortness of breath.    Cardiovascular: Positive for chest pain. Negative for leg swelling.        States that she has chest pain occasionally 2/2 cardiomegaly.    Gastrointestinal: Negative for diarrhea, nausea and vomiting.   Endocrine: Negative for cold intolerance, heat intolerance, polydipsia, polyphagia and polyuria.   Skin: Negative for rash and skin lesions.   Neurological: Negative for dizziness, seizures, syncope, facial asymmetry, weakness, light-headedness, headache, memory problem and confusion.       Past Medical History:   Diagnosis Date   • Diabetes (CMS/[facility])    • GERD (gastroesophageal reflux disease)    • History of recurrent UTIs    • Seasonal  allergies      Family History   Problem Relation Age of Onset   • Cancer Father         Unsure what kind   • Hypertension Father    • Stroke Father    • Kidney failure Mother      Past Surgical History:   Procedure Laterality Date   • WISDOM TOOTH EXTRACTION  2015     Social History     Socioeconomic History   • Marital status: Single     Spouse name: Not on file   • Number of children: Not on file   • Years of education: Not on file   • Highest education level: Not on file   Tobacco Use   • Smoking status: Current Every Day Smoker     Packs/day: 0.50     Years: 12.00     Pack years: 6.00     Types: Cigarettes   • Smokeless tobacco: Never Used   Substance and Sexual Activity   • Alcohol use: Yes     Comment: on occasion, at most 1-2 drinks a month   • Drug use: Yes     Frequency: 4.0 times per week     Types: Marijuana   • Sexual activity: Yes     Partners: Male     Birth control/protection: None         Current Outpatient Medications:   •  atenolol (TENORMIN) 25 MG tablet, Take 1 tablet by mouth Daily., Disp: 30 tablet, Rfl: 2  •  chlorhexidine (HIBICLENS) 4 % external liquid, Apply  topically to the appropriate area as directed Every Other Day., Disp: 473 mL, Rfl: 5  •  Ertugliflozin-metFORMIN HCl 7.5-1000 MG tablet, Take 1 tablet by mouth 2 (Two) Times a Day., Disp: 60 tablet, Rfl: 2  •  famotidine (PEPCID) 20 MG tablet, Take 1 tablet by mouth 2 (Two) Times a Day As Needed for Heartburn., Disp: 60 tablet, Rfl: 2  •  Insulin Pen Needle (PEN NEEDLES) 31G X 5 MM misc, 1.8 mL Daily., Disp: 100 each, Rfl: 2  •  Lancets (FREESTYLE) lancets, USE ONE LANCET TO TEST DAILY AS INSTRUCTED, Disp: 100 each, Rfl: 10  •  levothyroxine (SYNTHROID, LEVOTHROID) 75 MCG tablet, Take 1 tablet by mouth Daily., Disp: 90 tablet, Rfl: 1  •  Prenatal Vit-Fe Fumarate-FA (PRENATAL, CLASSIC, VITAMIN) 28-0.8 MG tablet tablet, Take  by mouth Daily., Disp: , Rfl:   •  Semaglutide, 1 MG/DOSE, (OZEMPIC, 1 MG/DOSE,) 2 MG/1.5ML solution  "pen-injector, Inject 1 mg under the skin into the appropriate area as directed 1 (One) Time Per Week., Disp: 3 mL, Rfl: 5  •  traZODone (DESYREL) 150 MG tablet, Take 1 tablet by mouth Every Night., Disp: 30 tablet, Rfl: 2  •  glucose blood (FREESTYLE LITE) test strip, 1 each by Other route 2 (Two) Times a Day. Use as instructed, Disp: 100 each, Rfl: 12    Objective   Vitals:    03/02/20 1426   BP: 118/74   Pulse: 88   Resp: 16   Temp: 98.7 °F (37.1 °C)   TempSrc: Temporal   SpO2: 99%   Weight: 94.3 kg (208 lb)   Height: 160 cm (63\")     Body mass index is 36.85 kg/m².  Physical Exam   Constitutional: She is oriented to person, place, and time. She appears well-developed and well-nourished. No distress.   HENT:   Head: Normocephalic.   Eyes: Pupils are equal, round, and reactive to light.   Neck: Neck supple. No thyromegaly present.   Cardiovascular: Normal rate and regular rhythm.   Pulmonary/Chest: Effort normal and breath sounds normal.   Neurological: She is alert and oriented to person, place, and time.   Skin: Skin is warm and dry. Capillary refill takes 2 to 3 seconds. She is not diaphoretic.   Sinus tracts from HS under both axillae, bilateral. No abscess at this time.    Psychiatric: She has a normal mood and affect. Her behavior is normal. Judgment and thought content normal.   Nursing note and vitals reviewed.      Assessment/Plan   Problem List Items Addressed This Visit        Cardiovascular and Mediastinum    Mild concentric left ventricular hypertrophy (LVH)    Relevant Medications    atenolol (TENORMIN) 25 MG tablet    Other Relevant Orders    Comprehensive Metabolic Panel    CBC & Differential    Essential hypertension    Relevant Medications    atenolol (TENORMIN) 25 MG tablet    Other Relevant Orders    Comprehensive Metabolic Panel    CBC & Differential       Digestive    Vitamin D deficiency    Relevant Orders    Vitamin D 25 Hydroxy       Endocrine    Type 2 diabetes mellitus without " complication, without long-term current use of insulin (CMS/HCC)    Relevant Medications    Insulin Pen Needle (PEN NEEDLES) 31G X 5 MM misc    Ertugliflozin-metFORMIN HCl 7.5-1000 MG tablet    Semaglutide, 1 MG/DOSE, (OZEMPIC, 1 MG/DOSE,) 2 MG/1.5ML solution pen-injector    Lancets (FREESTYLE) lancets    glucose blood (FREESTYLE LITE) test strip    Other Relevant Orders    Hemoglobin A1c    Comprehensive Metabolic Panel    CBC & Differential    Microalbumin / Creatinine Urine Ratio - Urine, Clean Catch    PCOS (polycystic ovarian syndrome)    Relevant Orders    Hemoglobin A1c    Comprehensive Metabolic Panel    CBC & Differential    Hypothyroid    Relevant Medications    levothyroxine (SYNTHROID, LEVOTHROID) 75 MCG tablet    atenolol (TENORMIN) 25 MG tablet    Other Relevant Orders    TSH       Other    Elevated liver enzymes - Primary    Relevant Orders    Comprehensive Metabolic Panel    CBC & Differential      Other Visit Diagnoses     Lipid screening        Relevant Orders    Lipid Panel    Abnormal facial hair        Relevant Orders    Ambulatory Referral to Dermatology        Yasmin was seen today for diabetes, arm pain and vitamin d deficiency.    Diagnoses and all orders for this visit:    Elevated liver enzymes  -     Comprehensive Metabolic Panel  -     CBC & Differential    Type 2 diabetes mellitus without complication, without long-term current use of insulin (CMS/HCC)  -     Hemoglobin A1c  -     Comprehensive Metabolic Panel  -     CBC & Differential  -     Microalbumin / Creatinine Urine Ratio - Urine, Clean Catch  - Avoid concentrated sweets and pepsi. Try and increase water intake. Eat well balanced diet, low in carbohydrates.   Recheck today  PCOS (polycystic ovarian syndrome)  -     Hemoglobin A1c  -     Comprehensive Metabolic Panel  -     CBC & Differential  -  Keep tight control of blood glucose to help decrease insulin resistance seen with PCOS.     Other specified hypothyroidism  -      TSH    Vitamin D deficiency  -     Vitamin D 25 Hydroxy  Discussed importance of vitamin D for bone strength and possible link to heart disease  Essential hypertension  -     Comprehensive Metabolic Panel  -     CBC & Differential  HS      -   Recommend low-carb diet and continue with Hibiclens every other day  Mild concentric left ventricular hypertrophy (LVH)  -     Comprehensive Metabolic Panel  -     CBC & Differential    Lipid screening  -     Lipid Panel    Abnormal facial hair  -     Ambulatory Referral to Dermatology  - Can look into laser hair removal. Tends to be less expensive around big holidays.             The patient was counseled regarding diagnostic results, impressions, prognosis, instructions for management, risk factor reductions, education, and importance of treatment compliance.  The patient verbalized understanding of and agreement with the plan of care.    Advised patient to call with any further questions and any new or worsening symptoms.     Return in about 3 months (around 6/2/2020), or if symptoms worsen or fail to improve.      Yasmin Hanna, APRN    Please note that portions of this note were completed with a voice recognition program. Efforts were made to edit the dictations, but occasionally words are mistranscribed.

## 2020-03-03 DIAGNOSIS — E11.9 TYPE 2 DIABETES MELLITUS WITHOUT COMPLICATION, WITHOUT LONG-TERM CURRENT USE OF INSULIN (HCC): ICD-10-CM

## 2020-03-03 DIAGNOSIS — E55.9 VITAMIN D DEFICIENCY: Primary | ICD-10-CM

## 2020-03-03 LAB
25(OH)D3+25(OH)D2 SERPL-MCNC: 14.2 NG/ML (ref 30–100)
ALBUMIN SERPL-MCNC: 4.5 G/DL (ref 3.8–4.8)
ALBUMIN/CREAT UR: <4 MG/G CREAT (ref 0–29)
ALBUMIN/GLOB SERPL: 1.8 {RATIO} (ref 1.2–2.2)
ALP SERPL-CCNC: 67 IU/L (ref 39–117)
ALT SERPL-CCNC: 22 IU/L (ref 0–32)
AST SERPL-CCNC: 15 IU/L (ref 0–40)
BASOPHILS # BLD AUTO: 0 X10E3/UL (ref 0–0.2)
BASOPHILS NFR BLD AUTO: 1 %
BILIRUB SERPL-MCNC: <0.2 MG/DL (ref 0–1.2)
BUN SERPL-MCNC: 12 MG/DL (ref 6–20)
BUN/CREAT SERPL: 14 (ref 9–23)
CALCIUM SERPL-MCNC: 9.5 MG/DL (ref 8.7–10.2)
CHLORIDE SERPL-SCNC: 104 MMOL/L (ref 96–106)
CHOLEST SERPL-MCNC: 182 MG/DL (ref 100–199)
CO2 SERPL-SCNC: 22 MMOL/L (ref 20–29)
CREAT SERPL-MCNC: 0.86 MG/DL (ref 0.57–1)
CREAT UR-MCNC: 79.5 MG/DL
EOSINOPHIL # BLD AUTO: 0.2 X10E3/UL (ref 0–0.4)
EOSINOPHIL NFR BLD AUTO: 3 %
ERYTHROCYTE [DISTWIDTH] IN BLOOD BY AUTOMATED COUNT: 13.9 % (ref 11.7–15.4)
GLOBULIN SER CALC-MCNC: 2.5 G/DL (ref 1.5–4.5)
GLUCOSE SERPL-MCNC: 130 MG/DL (ref 65–99)
HBA1C MFR BLD: 7.7 % (ref 4.8–5.6)
HCT VFR BLD AUTO: 44.1 % (ref 34–46.6)
HDLC SERPL-MCNC: 44 MG/DL
HGB BLD-MCNC: 13.9 G/DL (ref 11.1–15.9)
IMM GRANULOCYTES # BLD AUTO: 0 X10E3/UL (ref 0–0.1)
IMM GRANULOCYTES NFR BLD AUTO: 0 %
LDLC SERPL CALC-MCNC: 111 MG/DL (ref 0–99)
LYMPHOCYTES # BLD AUTO: 3.6 X10E3/UL (ref 0.7–3.1)
LYMPHOCYTES NFR BLD AUTO: 44 %
MCH RBC QN AUTO: 27.7 PG (ref 26.6–33)
MCHC RBC AUTO-ENTMCNC: 31.5 G/DL (ref 31.5–35.7)
MCV RBC AUTO: 88 FL (ref 79–97)
MICROALBUMIN UR-MCNC: <3 UG/ML
MONOCYTES # BLD AUTO: 0.6 X10E3/UL (ref 0.1–0.9)
MONOCYTES NFR BLD AUTO: 7 %
NEUTROPHILS # BLD AUTO: 3.8 X10E3/UL (ref 1.4–7)
NEUTROPHILS NFR BLD AUTO: 45 %
PLATELET # BLD AUTO: 275 X10E3/UL (ref 150–450)
POTASSIUM SERPL-SCNC: 4.5 MMOL/L (ref 3.5–5.2)
PROT SERPL-MCNC: 7 G/DL (ref 6–8.5)
RBC # BLD AUTO: 5.02 X10E6/UL (ref 3.77–5.28)
SODIUM SERPL-SCNC: 141 MMOL/L (ref 134–144)
TRIGL SERPL-MCNC: 135 MG/DL (ref 0–149)
TSH SERPL DL<=0.005 MIU/L-ACNC: 1.2 UIU/ML (ref 0.45–4.5)
VLDLC SERPL CALC-MCNC: 27 MG/DL (ref 5–40)
WBC # BLD AUTO: 8.3 X10E3/UL (ref 3.4–10.8)

## 2020-03-03 RX ORDER — ERGOCALCIFEROL 1.25 MG/1
50000 CAPSULE ORAL WEEKLY
Qty: 12 CAPSULE | Refills: 0 | Status: SHIPPED | OUTPATIENT
Start: 2020-03-03 | End: 2020-12-18 | Stop reason: SDUPTHER

## 2020-03-03 RX ORDER — INSULIN GLARGINE 100 [IU]/ML
10 INJECTION, SOLUTION SUBCUTANEOUS NIGHTLY
Qty: 9 ML | Refills: 5 | Status: SHIPPED | OUTPATIENT
Start: 2020-03-03 | End: 2020-04-15

## 2020-03-03 RX ORDER — PEN NEEDLE, DIABETIC 30 GX3/16"
1.8 NEEDLE, DISPOSABLE MISCELLANEOUS DAILY
Qty: 100 EACH | Refills: 2 | Status: SHIPPED | OUTPATIENT
Start: 2020-03-03 | End: 2022-03-01

## 2020-03-03 RX ORDER — LANCETS 28 GAUGE
1 EACH MISCELLANEOUS DAILY
Qty: 100 EACH | Refills: 10 | Status: SHIPPED | OUTPATIENT
Start: 2020-03-03 | End: 2021-09-02 | Stop reason: SDUPTHER

## 2020-03-09 DIAGNOSIS — R12 HEARTBURN: ICD-10-CM

## 2020-03-09 RX ORDER — FAMOTIDINE 20 MG/1
TABLET, FILM COATED ORAL
Qty: 60 TABLET | Refills: 2 | Status: SHIPPED | OUTPATIENT
Start: 2020-03-09 | End: 2020-06-15

## 2020-03-10 DIAGNOSIS — E11.9 TYPE 2 DIABETES MELLITUS WITHOUT COMPLICATION, WITHOUT LONG-TERM CURRENT USE OF INSULIN (HCC): ICD-10-CM

## 2020-03-10 DIAGNOSIS — I10 ESSENTIAL HYPERTENSION: ICD-10-CM

## 2020-03-10 RX ORDER — ERTUGLIFLOZIN AND METFORMIN HYDROCHLORIDE 7.5; 1 MG/1; MG/1
TABLET, FILM COATED ORAL
Qty: 60 TABLET | Refills: 1 | OUTPATIENT
Start: 2020-03-10

## 2020-03-10 RX ORDER — ATENOLOL 25 MG/1
TABLET ORAL
Qty: 30 TABLET | Refills: 2 | Status: SHIPPED | OUTPATIENT
Start: 2020-03-10 | End: 2020-06-15

## 2020-03-12 ENCOUNTER — TELEPHONE (OUTPATIENT)
Dept: INTERNAL MEDICINE | Facility: CLINIC | Age: 35
End: 2020-03-12

## 2020-03-12 DIAGNOSIS — E11.9 TYPE 2 DIABETES MELLITUS WITHOUT COMPLICATION, WITHOUT LONG-TERM CURRENT USE OF INSULIN (HCC): ICD-10-CM

## 2020-03-12 NOTE — TELEPHONE ENCOUNTER
Please let her know that they did not request the Ozempic which is why we did not send it in.  I did just send it in now.

## 2020-03-12 NOTE — TELEPHONE ENCOUNTER
PATIENT CALLING REQUESTING DR CAROLE CRAIG' NURSE TO CALL BACK REGARDING HER RECENT MED REFILLS AND ASKING ABOUT WHY SHE DID NOT HAVE HER OTHER MEDICATION OF THE Semaglutide, 1 MG/DOSE, (OZEMPIC, 1 MG/DOSE,) 2 MG/1.5ML solution pen-injector CALLED IN BECAUSE SHE NEEDS IT TWICE DAILY AND IS OUT OF THIS MEDICATION       HER CONCERN IS THIS WAS NOT REFILLED WITH HER OTHER TWO MEDICATIONS AND PATIENT WANTS TO KNOW DID THEY TAKE HER OFF THIS ? SHE IS OUT THIS AND NEEDS IT.        PHARMACY VERIFIED AS [facility] [address] [place] [address] [address] [id number] [address] - [phone] PH - [phone] FX P      PLEASE CALL AND ADVISE AND SEND OVER QUICKLY BECAUSE SHE IS OUT.

## 2020-03-27 ENCOUNTER — TELEPHONE (OUTPATIENT)
Dept: INTERNAL MEDICINE | Facility: CLINIC | Age: 35
End: 2020-03-27

## 2020-03-27 DIAGNOSIS — E11.9 TYPE 2 DIABETES MELLITUS WITHOUT COMPLICATION, WITHOUT LONG-TERM CURRENT USE OF INSULIN (HCC): ICD-10-CM

## 2020-03-27 NOTE — TELEPHONE ENCOUNTER
Pt is requesting segluromet metformin. She stats she talked to a nurse a week ago and was informed this was sent to the pharmacy. Please advise    [facility]    Pt can be reached at [phone]

## 2020-03-31 ENCOUNTER — TELEPHONE (OUTPATIENT)
Dept: INTERNAL MEDICINE | Facility: CLINIC | Age: 35
End: 2020-03-31

## 2020-03-31 DIAGNOSIS — E11.9 TYPE 2 DIABETES MELLITUS WITHOUT COMPLICATION, WITHOUT LONG-TERM CURRENT USE OF INSULIN (HCC): ICD-10-CM

## 2020-03-31 RX ORDER — INSULIN GLARGINE 100 [IU]/ML
10 INJECTION, SOLUTION SUBCUTANEOUS NIGHTLY
Qty: 9 ML | Refills: 5 | Status: CANCELLED | OUTPATIENT
Start: 2020-03-31

## 2020-04-09 DIAGNOSIS — E03.8 OTHER SPECIFIED HYPOTHYROIDISM: ICD-10-CM

## 2020-04-09 RX ORDER — LEVOTHYROXINE SODIUM 0.07 MG/1
TABLET ORAL
Qty: 30 TABLET | Refills: 2 | Status: SHIPPED | OUTPATIENT
Start: 2020-04-09 | End: 2020-07-17

## 2020-04-15 ENCOUNTER — TELEPHONE (OUTPATIENT)
Dept: INTERNAL MEDICINE | Facility: CLINIC | Age: 35
End: 2020-04-15

## 2020-04-15 DIAGNOSIS — E11.9 TYPE 2 DIABETES MELLITUS WITHOUT COMPLICATION, WITHOUT LONG-TERM CURRENT USE OF INSULIN (HCC): Primary | ICD-10-CM

## 2020-04-15 NOTE — TELEPHONE ENCOUNTER
PATIENT HAS CALLED TO INFORM HER PCP THAT SHE QUIT TAKING LANTUS SOLO STAR AS OF Friday. IT WAS CAUSING HER TO HAVE BLURRY VISION, CHILLS, AND VOMITING.   SHE STATES THAT SHE HAS BEEN FEELING A LITTLE BETTER SINCE Friday. SHE WOULD ALSO LIKE TO ADD THAT HER INSURANCE COMPANY HAS SENT HER LETTER STATING THAT HER INSURANCE COMPANY WAS ONLY GOING TO COVER A 3 MONTH SUPPLY OF LANTUS SOLO STAR. SHE STATES HER PRESCRIPITION CAME AS A 5 MONTH SUPPLY. SHE WAS A BIT CONFUSED ON THAT AND WOULD LIKE SOME CLARITY ON WHAT THE LETTER WAS STATING.   PLEASE HUMBERTO PATIENT FOR ANY OTHER QUESTIONS OR CONCERNS AT   [phone]

## 2020-04-15 NOTE — TELEPHONE ENCOUNTER
Okay, I am going to try for a different type of insulin to see if she has less side effects.  The same way.

## 2020-06-09 ENCOUNTER — OFFICE VISIT (OUTPATIENT)
Dept: INTERNAL MEDICINE | Facility: CLINIC | Age: 35
End: 2020-06-09

## 2020-06-09 VITALS
HEART RATE: 76 BPM | OXYGEN SATURATION: 98 % | HEIGHT: 63 IN | DIASTOLIC BLOOD PRESSURE: 74 MMHG | TEMPERATURE: 98.4 F | BODY MASS INDEX: 33.66 KG/M2 | SYSTOLIC BLOOD PRESSURE: 122 MMHG | RESPIRATION RATE: 20 BRPM | WEIGHT: 190 LBS

## 2020-06-09 DIAGNOSIS — E66.09 CLASS 1 OBESITY DUE TO EXCESS CALORIES WITH SERIOUS COMORBIDITY AND BODY MASS INDEX (BMI) OF 33.0 TO 33.9 IN ADULT: ICD-10-CM

## 2020-06-09 DIAGNOSIS — E11.9 TYPE 2 DIABETES MELLITUS WITHOUT COMPLICATION, WITHOUT LONG-TERM CURRENT USE OF INSULIN (HCC): Primary | ICD-10-CM

## 2020-06-09 DIAGNOSIS — I10 ESSENTIAL HYPERTENSION: ICD-10-CM

## 2020-06-09 DIAGNOSIS — F51.01 PRIMARY INSOMNIA: ICD-10-CM

## 2020-06-09 LAB — HBA1C MFR BLD: 6.4 %

## 2020-06-09 PROCEDURE — 83036 HEMOGLOBIN GLYCOSYLATED A1C: CPT | Performed by: NURSE PRACTITIONER

## 2020-06-09 PROCEDURE — 99214 OFFICE O/P EST MOD 30 MIN: CPT | Performed by: NURSE PRACTITIONER

## 2020-06-09 RX ORDER — INSULIN GLARGINE 100 [IU]/ML
INJECTION, SOLUTION SUBCUTANEOUS
COMMUNITY
Start: 2020-03-03 | End: 2020-06-09

## 2020-06-09 NOTE — PROGRESS NOTES
Subjective   Yasmin Singleton is a 34 y.o. female here today for DM    Chief Complaint   Patient presents with   • Diabetes   Yasmin is here today for three-month follow-up on hypertension, obesity, and diabetes.  She was most recently started on Tresiba due to her A1c being 7.7.  She was unable to get this until approximately 1 month ago.  She has also lost 19 pounds since her last check.  She has been watching her portions and also believes that Ozempic has helped.  She is no longer requiring the trazodone for insomnia would like to reduce her medication burden.  She is up-to-date on her eye and foot exam.  She has had her pneumococcal vaccine.  Statin and ACE contraindicated as she is interested in having children but has had difficulty with conceiving due to PCOS.    Review of Systems   Constitutional: Negative for activity change, appetite change, diaphoresis, fatigue, unexpected weight gain and unexpected weight loss.   HENT: Negative for nosebleeds and trouble swallowing.    Eyes: Negative for blurred vision, double vision and visual disturbance.   Respiratory: Negative for chest tightness and shortness of breath.    Cardiovascular: Negative for chest pain, palpitations and leg swelling.   Gastrointestinal: Negative for blood in stool, diarrhea, nausea and vomiting.   Endocrine: Negative for cold intolerance, heat intolerance, polydipsia, polyphagia and polyuria.   Skin: Negative for rash and skin lesions.   Neurological: Negative for dizziness, seizures, syncope, facial asymmetry, weakness, light-headedness, headache, memory problem and confusion.   Psychiatric/Behavioral: Negative for sleep disturbance and suicidal ideas.       Past Medical History:   Diagnosis Date   • Diabetes (CMS/[facility])    • GERD (gastroesophageal reflux disease)    • History of recurrent UTIs    • Seasonal allergies      Family History   Problem Relation Age of Onset   • Cancer Father         Unsure what kind   • Hypertension Father    •  Stroke Father    • Kidney failure Mother      Past Surgical History:   Procedure Laterality Date   • WISDOM TOOTH EXTRACTION  2015     Social History     Socioeconomic History   • Marital status: Single     Spouse name: Not on file   • Number of children: Not on file   • Years of education: Not on file   • Highest education level: Not on file   Tobacco Use   • Smoking status: Current Every Day Smoker     Packs/day: 0.50     Years: 12.00     Pack years: 6.00     Types: Cigarettes   • Smokeless tobacco: Never Used   Substance and Sexual Activity   • Alcohol use: Yes     Comment: on occasion, at most 1-2 drinks a month   • Drug use: Yes     Frequency: 4.0 times per week     Types: Marijuana   • Sexual activity: Yes     Partners: Male     Birth control/protection: None         Current Outpatient Medications:   •  atenolol (TENORMIN) 25 MG tablet, TAKE ONE TABLET BY MOUTH DAILY, Disp: 30 tablet, Rfl: 2  •  chlorhexidine (HIBICLENS) 4 % external liquid, Apply  topically to the appropriate area as directed Every Other Day., Disp: 473 mL, Rfl: 5  •  Ertugliflozin-metFORMIN HCl 7.5-1000 MG tablet, Take 1 tablet by mouth 2 (Two) Times a Day., Disp: 60 tablet, Rfl: 2  •  famotidine (PEPCID) 20 MG tablet, TAKE ONE TABLET BY MOUTH TWICE A DAY AS NEEDED FOR HEARTBURN, Disp: 60 tablet, Rfl: 2  •  glucose blood (FREESTYLE LITE) test strip, 1 each by Other route 2 (Two) Times a Day. Use as instructed, Disp: 100 each, Rfl: 12  •  insulin degludec (TRESIBA FLEXTOUCH) 100 UNIT/ML solution pen-injector injection, Inject 10 Units under the skin into the appropriate area as directed Every Night., Disp: 3 pen, Rfl: 5  •  Insulin Pen Needle (PEN NEEDLES) 31G X 5 MM misc, 1.8 mL Daily., Disp: 100 each, Rfl: 2  •  Lancets (FREESTYLE) lancets, 1 each by Other route Daily. Use as instructed, Disp: 100 each, Rfl: 10  •  levothyroxine (SYNTHROID, LEVOTHROID) 75 MCG tablet, TAKE ONE TABLET BY MOUTH DAILY, Disp: 30 tablet, Rfl: 2  •  Prenatal  "Vit-Fe Fumarate-FA (PRENATAL, CLASSIC, VITAMIN) 28-0.8 MG tablet tablet, Take  by mouth Daily., Disp: , Rfl:   •  Semaglutide, 1 MG/DOSE, (OZEMPIC, 1 MG/DOSE,) 2 MG/1.5ML solution pen-injector, Inject 1 mg under the skin into the appropriate area as directed 1 (One) Time Per Week., Disp: 3 mL, Rfl: 5  •  vitamin D (ERGOCALCIFEROL) 1.25 MG (86666 UT) capsule capsule, Take 1 capsule by mouth 1 (One) Time Per Week., Disp: 12 capsule, Rfl: 0    Objective   Vitals:    06/09/20 1240   BP: 122/74   Pulse: 76   Resp: 20   Temp: 98.4 °F (36.9 °C)   TempSrc: Temporal   SpO2: 98%   Weight: 86.2 kg (190 lb)   Height: 160 cm (63\")     Body mass index is 33.66 kg/m².  Physical Exam   Constitutional: She is oriented to person, place, and time. She appears well-developed and well-nourished. No distress.   Eyes: Pupils are equal, round, and reactive to light.   Neck: Neck supple. No thyromegaly present.   Cardiovascular: Normal rate and regular rhythm.   Pulmonary/Chest: Effort normal and breath sounds normal.   Neurological: She is alert and oriented to person, place, and time.   Skin: Skin is warm and dry. Capillary refill takes 2 to 3 seconds. She is not diaphoretic.   Psychiatric: She has a normal mood and affect. Her behavior is normal. Judgment and thought content normal.   Nursing note and vitals reviewed.      Assessment/Plan   Problem List Items Addressed This Visit        Cardiovascular and Mediastinum    Essential hypertension    Relevant Medications    atenolol (TENORMIN) 25 MG tablet       Endocrine    Type 2 diabetes mellitus without complication, without long-term current use of insulin (CMS/[facility]) - Primary    Relevant Medications    glucose blood (FREESTYLE LITE) test strip    Insulin Pen Needle (PEN NEEDLES) 31G X 5 MM misc    Lancets (FREESTYLE) lancets    Semaglutide, 1 MG/DOSE, (OZEMPIC, 1 MG/DOSE,) 2 MG/1.5ML solution pen-injector    Ertugliflozin-metFORMIN HCl 7.5-1000 MG tablet    insulin degludec (TRESIBA " FLEXTOUCH) 100 UNIT/ML solution pen-injector injection    Other Relevant Orders    POC Glycosylated Hemoglobin (Hb A1C) (Completed)      Other Visit Diagnoses     Primary insomnia        Class 1 obesity due to excess calories with serious comorbidity and body mass index (BMI) of 33.0 to 33.9 in adult            Yasmin was seen today for diabetes.    Diagnoses and all orders for this visit:    Type 2 diabetes mellitus without complication, without long-term current use of insulin (CMS/[facility])  -     POC Glycosylated Hemoglobin (Hb A1C)  A1c at 6.4 today.  Congratulated on weight loss and A1c.  Continue current treatment plan and follow-up in 3 months  Essential hypertension  Stable, continue on beta-blocker  Primary insomnia  Improving- no longer requiring medications  Class 1 obesity due to excess calories with serious comorbidity and body mass index (BMI) of 33.0 to 33.9 in adult  Congratulated on weight loss           The patient was counseled regarding diagnostic results, impressions, prognosis, instructions for management, risk factor reductions, education, and importance of treatment compliance.  The patient verbalized understanding of and agreement with the plan of care.    Advised patient to call with any further questions and any new or worsening symptoms.     Return in about 3 months (around 9/9/2020).      JAIMIE Khan    Please note that portions of this note were completed with a voice recognition program. Efforts were made to edit the dictations, but occasionally words are mistranscribed.

## 2020-06-12 DIAGNOSIS — R12 HEARTBURN: ICD-10-CM

## 2020-06-12 DIAGNOSIS — I10 ESSENTIAL HYPERTENSION: ICD-10-CM

## 2020-06-15 RX ORDER — ATENOLOL 25 MG/1
TABLET ORAL
Qty: 30 TABLET | Refills: 3 | Status: SHIPPED | OUTPATIENT
Start: 2020-06-15 | End: 2020-09-10 | Stop reason: SDUPTHER

## 2020-06-15 RX ORDER — FAMOTIDINE 20 MG/1
TABLET, FILM COATED ORAL
Qty: 60 TABLET | Refills: 3 | Status: SHIPPED | OUTPATIENT
Start: 2020-06-15 | End: 2020-10-16

## 2020-07-01 ENCOUNTER — OFFICE VISIT (OUTPATIENT)
Dept: INTERNAL MEDICINE | Facility: CLINIC | Age: 35
End: 2020-07-01

## 2020-07-01 VITALS
DIASTOLIC BLOOD PRESSURE: 74 MMHG | SYSTOLIC BLOOD PRESSURE: 116 MMHG | RESPIRATION RATE: 20 BRPM | HEART RATE: 88 BPM | BODY MASS INDEX: 33.31 KG/M2 | TEMPERATURE: 98.2 F | OXYGEN SATURATION: 100 % | WEIGHT: 188 LBS | HEIGHT: 63 IN

## 2020-07-01 DIAGNOSIS — M77.8 EXTENSOR TENDONITIS OF FOOT: Primary | ICD-10-CM

## 2020-07-01 PROCEDURE — 99213 OFFICE O/P EST LOW 20 MIN: CPT | Performed by: NURSE PRACTITIONER

## 2020-07-01 NOTE — PROGRESS NOTES
Subjective   Yasmin Singleton is a 34 y.o. female here today for foot pain.    Chief Complaint   Patient presents with   • Foot Problem     pain in vein    Monique is here today with a one-month history of left foot pain.  The pain is when she is walking but sometimes also when she is seated.  It hurts along the bridge of her foot.  It is a sharp shooting pain.  She has been exercising more.  There is no swelling or tenderness to touch.    Review of Systems   Constitutional: Negative for activity change, appetite change, diaphoresis, fatigue, unexpected weight gain and unexpected weight loss.   HENT: Negative for nosebleeds and trouble swallowing.    Eyes: Negative for blurred vision, double vision and visual disturbance.   Respiratory: Negative for chest tightness and shortness of breath.    Cardiovascular: Negative for chest pain, palpitations and leg swelling.   Gastrointestinal: Negative for blood in stool, diarrhea, nausea and vomiting.   Endocrine: Negative for cold intolerance, heat intolerance, polydipsia, polyphagia and polyuria.   Musculoskeletal: Positive for arthralgias.   Skin: Negative for rash and skin lesions.   Neurological: Negative for dizziness, seizures, syncope, facial asymmetry, weakness, light-headedness, headache, memory problem and confusion.   Psychiatric/Behavioral: Negative for sleep disturbance and suicidal ideas.       Past Medical History:   Diagnosis Date   • Diabetes (CMS/[facility])    • GERD (gastroesophageal reflux disease)    • History of recurrent UTIs    • Seasonal allergies      Family History   Problem Relation Age of Onset   • Cancer Father         Unsure what kind   • Hypertension Father    • Stroke Father    • Kidney failure Mother      Past Surgical History:   Procedure Laterality Date   • WISDOM TOOTH EXTRACTION  2015     Social History     Socioeconomic History   • Marital status: Single     Spouse name: Not on file   • Number of children: Not on file   • Years of education: Not on  file   • Highest education level: Not on file   Tobacco Use   • Smoking status: Current Every Day Smoker     Packs/day: 0.50     Years: 12.00     Pack years: 6.00     Types: Cigarettes   • Smokeless tobacco: Never Used   Substance and Sexual Activity   • Alcohol use: Yes     Comment: on occasion, at most 1-2 drinks a month   • Drug use: Yes     Frequency: 4.0 times per week     Types: Marijuana   • Sexual activity: Yes     Partners: Male     Birth control/protection: None         Current Outpatient Medications:   •  atenolol (TENORMIN) 25 MG tablet, TAKE ONE TABLET BY MOUTH DAILY, Disp: 30 tablet, Rfl: 3  •  chlorhexidine (HIBICLENS) 4 % external liquid, Apply  topically to the appropriate area as directed Every Other Day., Disp: 473 mL, Rfl: 5  •  Ertugliflozin-metFORMIN HCl 7.5-1000 MG tablet, Take 1 tablet by mouth 2 (Two) Times a Day., Disp: 60 tablet, Rfl: 2  •  famotidine (PEPCID) 20 MG tablet, TAKE ONE TABLET BY MOUTH TWICE A DAY AS NEEDED FOR HEARTBURN, Disp: 60 tablet, Rfl: 3  •  glucose blood (FREESTYLE LITE) test strip, 1 each by Other route 2 (Two) Times a Day. Use as instructed, Disp: 100 each, Rfl: 12  •  insulin degludec (TRESIBA FLEXTOUCH) 100 UNIT/ML solution pen-injector injection, Inject 10 Units under the skin into the appropriate area as directed Every Night., Disp: 3 pen, Rfl: 5  •  Insulin Pen Needle (PEN NEEDLES) 31G X 5 MM misc, 1.8 mL Daily., Disp: 100 each, Rfl: 2  •  Lancets (FREESTYLE) lancets, 1 each by Other route Daily. Use as instructed, Disp: 100 each, Rfl: 10  •  levothyroxine (SYNTHROID, LEVOTHROID) 75 MCG tablet, TAKE ONE TABLET BY MOUTH DAILY, Disp: 30 tablet, Rfl: 2  •  Prenatal Vit-Fe Fumarate-FA (PRENATAL, CLASSIC, VITAMIN) 28-0.8 MG tablet tablet, Take  by mouth Daily., Disp: , Rfl:   •  Semaglutide, 1 MG/DOSE, (OZEMPIC, 1 MG/DOSE,) 2 MG/1.5ML solution pen-injector, Inject 1 mg under the skin into the appropriate area as directed 1 (One) Time Per Week., Disp: 3 mL, Rfl: 5  •  " vitamin D (ERGOCALCIFEROL) 1.25 MG (06583 UT) capsule capsule, Take 1 capsule by mouth 1 (One) Time Per Week., Disp: 12 capsule, Rfl: 0    Objective   Vitals:    07/01/20 1125   BP: 116/74   Pulse: 88   Resp: 20   Temp: 98.2 °F (36.8 °C)   TempSrc: Temporal   SpO2: 100%   Weight: 85.3 kg (188 lb)   Height: 160 cm (63\")     Body mass index is 33.3 kg/m².  Physical Exam   Constitutional: She is oriented to person, place, and time. She appears well-developed and well-nourished. No distress.   Pulmonary/Chest: Effort normal. No accessory muscle usage. No respiratory distress.   Musculoskeletal:        Left ankle: She exhibits normal range of motion, no swelling, no ecchymosis, no deformity, no laceration and normal pulse.     Vascular Status -  Her left foot exhibits normal foot vasculature  and no edema.  Skin Integrity  - Her left foot skin is intact..     Neurological: She is alert and oriented to person, place, and time.   Skin: No erythema. No pallor.   Psychiatric: She has a normal mood and affect. Her speech is normal and behavior is normal. Judgment and thought content normal.   Nursing note and vitals reviewed.      Assessment/Plan   Problem List Items Addressed This Visit     None      Visit Diagnoses     Extensor tendonitis of foot    -  Primary        Yasmin was seen today for foot problem.    Diagnoses and all orders for this visit:    Extensor tendonitis of foot    Recommend supportive brace.  She will ice each evening.  She will perform exercises attached.  She will call return to clinic with no improvement or new/concerning symptoms         The patient was counseled regarding diagnostic results, impressions, prognosis, instructions for management, risk factor reductions, education, and importance of treatment compliance.  The patient verbalized understanding of and agreement with the plan of care.    Advised patient to call with any further questions and any new or worsening symptoms.     Return if " symptoms worsen or fail to improve.      Yasmin Hanna, APRN    Please note that portions of this note were completed with a voice recognition program. Efforts were made to edit the dictations, but occasionally words are mistranscribed.

## 2020-07-01 NOTE — PATIENT INSTRUCTIONS
Anterior Ankle Impingement Rehab  Ask your health care provider which exercises are safe for you. Do exercises exactly as told by your health care provider and adjust them as directed. It is normal to feel mild stretching, pulling, tightness, or discomfort as you do these exercises. Stop right away if you feel sudden pain or your pain gets worse. Do not begin these exercises until told by your health care provider.  Stretching and range-of-motion exercise  Ankle alphabet    1. Sit with your left / right leg supported at the lower leg.  ? Do not rest your foot on anything.  ? Make sure your foot has room to move freely.  2. Think of your left / right foot as a paintbrush.  ? Move your foot to trace each letter of the alphabet in the air. Keep your hip and knee still while you trace.  ? Make the letters as large as you can without feeling discomfort.  3. Trace every letter from A to Z.  Repeat __________ times. Complete this exercise __________ times a day.  Strengthening exercises  Eccentric plantar flexion  Eccentric exercises cause a lengthening of a muscle against an external resistance.  1. Stand on the balls of your feet on the edge of a step. The ball of your foot is on the walking surface, right under your toes.  ? Do not put your heels on the step.  ? Rest your hand on a railing for balance.  2. If told by your health care provider, put on a backpack to add weight.  3. Rise up onto your toes (plantar flexion).  4. Keep your heels up while you slowly shift your body weight to your left / right foot.  5.  your other foot.  6. Slowly lower your weight through your left / right foot so your heel drops below the level of the step (eccentric). If this causes any pain at the front of your ankle, stop the motion prior to feeling any pain.  7. Put your other foot back on the step.  Repeat __________ times. Complete this exercise __________ times a day.  Ankle eversion  1. Sit on the floor with your legs  straight out in front of you.  2. Loop a rubber exercise band around the ball of your left / right foot. The ball of your foot is on the walking surface, right under your toes.  ? Hold the ends of the band in your hands, or secure the band to a stable object.  ? The band should be slightly tense when your foot is relaxed.  3. Slowly push your foot outward, away from your other leg (eversion).  4. Hold this position for __________ seconds.  5. Slowly return your foot to the starting position.  Repeat __________ times. Complete this exercise __________ times a day.  Balance exercises  Single leg stand  If this exercise is too easy, you can try it with your eyes closed or while standing on a pillow.  1. Without shoes, stand near a railing or in a doorway. You may hold on to the railing or door frame if you need to. Let go of the railing or door frame if you are able to.  2. Stand on your left / right foot. Keep your big toe down on the floor and try to keep your arch lifted.  3. Hold this position for ____ seconds.  Repeat __________ times. Complete this exercise __________ times a day.  Inversion and eversion  This exercise is also called foot rotation with a balance board. It uses a balance board to rotate the foot and ankle inward (inversion) and outward (eversion). Ask your health care provider where you can get a balance board or how you can make one.  1. Stand on a non-carpeted surface near a countertop or wall.  2. Step onto the balance board so your feet are hip width apart.  3. Keep your feet in place, and keep your upper body and hips steady.  4. Using only your feet and ankles to move the board, do the following exercises as told by your health care provider:  ? Tip the board side to side as far as you can, alternating between tipping to the left and tipping to the right.  ? Tip the board so it silently taps the floor. Do not let the board forcefully hit the floor.  ? From time to time, pause to hold a  steady midway position, with neither the right nor the left sides touching the ground.  ? Tip the board side to side so the board does not hit the floor at all. From time to time, pause to hold a steady midway position.  Repeat __________ times. Complete this exercise __________ times a day.  Plantar flexion and dorsiflexion  This exercise is also called foot flexion with a balance board. It uses a balance board to push the foot downward and away from the leg (plantar flexion) or upward and toward the leg (dorsiflexion). Ask your health care provider where you can get a balance board or how you can make one.  1. Stand on a non-carpeted surface near a countertop or wall.  2. Step onto the balance board so your feet are hip width apart.  3. Keep your feet in place, and keep your upper body and hips steady.  4. Using only your feet and ankles to move the board, do the following exercises as told by your health care provider:  ? Tip the board forward and backward so the board silently taps the floor. Do not let the board forcefully hit the floor.  ? From time to time, pause to hold a steady position midway between touching the floor in front and touching the floor in back.  ? Tip the board forward and backward so the board does not hit the floor at all. From time to time, pause to hold a steady position.  Repeat __________ times. Complete this exercise __________ times a day.  This information is not intended to replace advice given to you by your health care provider. Make sure you discuss any questions you have with your health care provider.  Document Released: 07/19/2006 Document Revised: 04/07/2020 Document Reviewed: 10/02/2019  Elsevier Patient Education © 2020 [employer]  Tarsal Tunnel Syndrome    Tarsal tunnel syndrome is a condition that happens when a nerve called the tibial nerve is irritated or squeezed (compressed) as it passes through an area on the inside of your ankle (tarsal tunnel). The tarsal  tunnel is a narrow passage through the connective tissue and bones in your feet (tarsals). The tibial nerve passes behind the large bony bump at your inner ankle (medial malleolus) and sends branches to your foot and toes. This nerve enables feeling by passing signals to your heel, the bottom of your foot, and some of your toe muscles.  Tarsal tunnel syndrome usually causes ankle and foot pain that gets worse with activity.  What are the causes?  Tarsal tunnel syndrome can be caused by any condition that narrows the space in the tarsal tunnel. Athletes may get tarsal tunnel syndrome from a fractured ankle or from an outward (eversion) ankle sprain that results in scarring or swelling. Other common causes include:  · Overpronation. This is when your feet roll inward and flatten too much when you stand, walk, or run.  · Extra pressure on the tarsal tunnel area from tight or stiff shoes or boots.  · Decreased room in the tarsal tunnel due to small, fluid-filled sacs (cysts) or growths on the bones near the tunnel (exostosis).  What increases the risk?  This condition is more likely to develop in people who:  · Play sports where they wear high, stiff boots, such as downhill skiing.  · Play sports with repetitive motion, such as running.  · Play sports on uneven surfaces that can lead to a sprained ankle, such as soccer or football.  · Have had an inner ankle injury.  · Have flat feet.  · Have other conditions, such as diabetes, hypothyroidism, or rheumatoid arthritis.  What are the signs or symptoms?  Symptoms of this condition include:  · Burning pain behind the ankle, in the heel, or in the foot. This pain gets worse if you are standing, walking, or running.  · Numbness or a prickling and tingling sensation in your heel, foot, or toes.  · Swelling in your ankle or heel.  At first, your symptoms may get worse with activity and be relieved with rest. Over time, your symptoms may become constant or come on sooner with  less activity.  How is this diagnosed?  This condition is diagnosed based on:  · Your symptoms.  · Your medical history.  · A physical exam. Your health care provider may tap on the area below your ankle to check for tingling in your foot or toes.  · You may also have other tests, including:  ? X-rays to check bone structure.  ? An MRI or ultrasound to examine nerve and tendon structures and find where your nerve is getting compressed.  ? A study of nerve function (electromyography or EMG).  How is this treated?  Treatment may include:  · Wearing a removable splint or boot for ankle support.  · Using a shoe insert (orthotic) to help support your arch.  · Taking NSAIDs to reduce pain.  · Using ice to reduce swelling.  · Having medicine injected into your ankle joint to reduce pain and swelling.  · Starting range-of-motion exercises and strengthening exercises.  · Gradually returning to full activity. The timing will depend on the severity of your condition and your response to treatment.  · Surgery.  Follow these instructions at home:  If you have a splint or boot:  · Wear the splint or boot as told by your health care provider. Remove it only as told by your health care provider.  · Loosen the splint or boot if your toes tingle, become numb, or turn cold and blue.  · Keep the splint or boot clean.  · If your splint or boot is not waterproof:  ? Do not let it get wet.  ? Cover it with a watertight covering when you take a bath or shower.  · Ask your health care provider when it is safe to drive if your splint or boot is on a foot that you use for driving.  Managing pain, stiffness, and swelling    · If directed, put ice on the injured area.  ? If you have a removable splint or boot, remove it as told by your health care provider.  ? Put ice in a plastic bag.  ? Place a towel between your skin and the bag.  ? Leave the ice on for 20 minutes, 2-3 times a day.  · Move your toes often to reduce stiffness and  swelling.  · Raise (elevate) your foot above the level of your heart while you are sitting or lying down.  Activity  · Return to your normal activities as told by your health care provider. Ask your health care provider what activities are safe for you.  · Do exercises as told by your health care provider.  General instructions  · Take over-the-counter and prescription medicines only as told by your health care provider.  · Do not use any products that contain nicotine or tobacco, such as cigarettes, e-cigarettes, and chewing tobacco. These can delay healing. If you need help quitting, ask your health care provider.  · Keep all follow-up visits as told by your health care provider. This is important.  How is this prevented?  · Give your body time to rest between periods of activity.  · Make sure to wear supportive and comfortable shoes during athletic activity.  · Do not overtighten ski boots or the laces on high-top shoes.  · Be safe and responsible while being active to avoid falls.  Contact a health care provider if:  · Your ankle pain is not getting better.  · You are unable to support (bear) body weight on your foot without pain.  Summary  · Tarsal tunnel syndrome is a condition that happens when a nerve called the tibial nerve is irritated or squeezed (compressed) as it passes through an area on the inside of your ankle (tarsal tunnel).  · Tarsal tunnel syndrome usually causes ankle and foot pain that gets worse with activity.  · This condition may be treated with a splint or boot, shoe inserts, ice, exercises, medicines, and surgery if needed.  · Follow your health care provider's instructions for caring for your splint or boot.  · Contact your health care provider if your ankle pain is not getting better, or if you are unable to bear your body weight without pain.  This information is not intended to replace advice given to you by your health care provider. Make sure you discuss any questions you have with  your health care provider.  Document Released: 12/18/2006 Document Revised: 11/07/2019 Document Reviewed: 11/07/2019  Elsevier Patient Education © 2020 [employer] Inc[employer]

## 2020-07-17 DIAGNOSIS — E03.8 OTHER SPECIFIED HYPOTHYROIDISM: ICD-10-CM

## 2020-07-17 RX ORDER — LEVOTHYROXINE SODIUM 0.07 MG/1
TABLET ORAL
Qty: 30 TABLET | Refills: 1 | Status: SHIPPED | OUTPATIENT
Start: 2020-07-17 | End: 2020-09-11 | Stop reason: SDUPTHER

## 2020-07-20 ENCOUNTER — OFFICE VISIT (OUTPATIENT)
Dept: INTERNAL MEDICINE | Facility: CLINIC | Age: 35
End: 2020-07-20

## 2020-07-20 ENCOUNTER — TELEPHONE (OUTPATIENT)
Dept: INTERNAL MEDICINE | Facility: CLINIC | Age: 35
End: 2020-07-20

## 2020-07-20 VITALS
RESPIRATION RATE: 16 BRPM | WEIGHT: 187 LBS | HEIGHT: 63 IN | HEART RATE: 86 BPM | BODY MASS INDEX: 33.13 KG/M2 | SYSTOLIC BLOOD PRESSURE: 116 MMHG | TEMPERATURE: 100 F | OXYGEN SATURATION: 98 % | DIASTOLIC BLOOD PRESSURE: 84 MMHG

## 2020-07-20 DIAGNOSIS — Z20.2 STD EXPOSURE: ICD-10-CM

## 2020-07-20 DIAGNOSIS — E11.9 TYPE 2 DIABETES MELLITUS WITHOUT COMPLICATION, WITHOUT LONG-TERM CURRENT USE OF INSULIN (HCC): ICD-10-CM

## 2020-07-20 DIAGNOSIS — E03.9 HYPOTHYROIDISM, UNSPECIFIED TYPE: ICD-10-CM

## 2020-07-20 DIAGNOSIS — E55.9 VITAMIN D DEFICIENCY: ICD-10-CM

## 2020-07-20 DIAGNOSIS — Z20.2 STD EXPOSURE: Primary | ICD-10-CM

## 2020-07-20 PROCEDURE — 96372 THER/PROPH/DIAG INJ SC/IM: CPT | Performed by: NURSE PRACTITIONER

## 2020-07-20 PROCEDURE — 99214 OFFICE O/P EST MOD 30 MIN: CPT | Performed by: NURSE PRACTITIONER

## 2020-07-20 RX ORDER — AZITHROMYCIN 500 MG/1
500 TABLET, FILM COATED ORAL ONCE
Qty: 2 TABLET | Refills: 0 | Status: SHIPPED | OUTPATIENT
Start: 2020-07-20 | End: 2020-07-20 | Stop reason: SDUPTHER

## 2020-07-20 RX ORDER — AZITHROMYCIN 500 MG/1
1000 TABLET, FILM COATED ORAL ONCE
Qty: 2 TABLET | Refills: 0 | Status: SHIPPED | OUTPATIENT
Start: 2020-07-20 | End: 2020-07-20

## 2020-07-20 NOTE — PROGRESS NOTES
Subjective   Yasmin Singleton is a 34 y.o. female here today for STD exposure    Chief Complaint   Patient presents with   • Exposure to STD   Yasmin is here today after discovering this weekend that her  cheated on her in January.  He notified her that his partner tested positive for gonorrhea.  She denies having any symptoms and presents for an STD screen.    Review of Systems   Constitutional: Negative for activity change, appetite change, fatigue, fever, unexpected weight gain and unexpected weight loss.   Cardiovascular: Negative for chest pain, palpitations and leg swelling.   Genitourinary: Negative.    Neurological: Negative for memory problem and confusion.   Psychiatric/Behavioral: Positive for dysphoric mood and stress. Negative for self-injury, sleep disturbance, suicidal ideas, negative for hyperactivity and depressed mood. The patient is not nervous/anxious.        Past Medical History:   Diagnosis Date   • Diabetes (CMS/HCC)    • GERD (gastroesophageal reflux disease)    • History of recurrent UTIs    • Seasonal allergies      Family History   Problem Relation Age of Onset   • Cancer Father         Unsure what kind   • Hypertension Father    • Stroke Father    • Kidney failure Mother      Past Surgical History:   Procedure Laterality Date   • WISDOM TOOTH EXTRACTION  2015     Social History     Socioeconomic History   • Marital status: Single     Spouse name: Not on file   • Number of children: Not on file   • Years of education: Not on file   • Highest education level: Not on file   Tobacco Use   • Smoking status: Current Every Day Smoker     Packs/day: 0.50     Years: 12.00     Pack years: 6.00     Types: Cigarettes   • Smokeless tobacco: Never Used   Substance and Sexual Activity   • Alcohol use: Yes     Comment: on occasion, at most 1-2 drinks a month   • Drug use: Yes     Frequency: 4.0 times per week     Types: Marijuana   • Sexual activity: Yes     Partners: Male     Birth  control/protection: None         Current Outpatient Medications:   •  atenolol (TENORMIN) 25 MG tablet, TAKE ONE TABLET BY MOUTH DAILY, Disp: 30 tablet, Rfl: 3  •  chlorhexidine (HIBICLENS) 4 % external liquid, Apply  topically to the appropriate area as directed Every Other Day., Disp: 473 mL, Rfl: 5  •  Ertugliflozin-metFORMIN HCl 7.5-1000 MG tablet, Take 1 tablet by mouth 2 (Two) Times a Day., Disp: 60 tablet, Rfl: 2  •  famotidine (PEPCID) 20 MG tablet, TAKE ONE TABLET BY MOUTH TWICE A DAY AS NEEDED FOR HEARTBURN, Disp: 60 tablet, Rfl: 3  •  glucose blood (FREESTYLE LITE) test strip, 1 each by Other route 2 (Two) Times a Day. Use as instructed, Disp: 100 each, Rfl: 12  •  insulin degludec (TRESIBA FLEXTOUCH) 100 UNIT/ML solution pen-injector injection, Inject 10 Units under the skin into the appropriate area as directed Every Night., Disp: 3 pen, Rfl: 5  •  Insulin Pen Needle (PEN NEEDLES) 31G X 5 MM misc, 1.8 mL Daily., Disp: 100 each, Rfl: 2  •  Lancets (FREESTYLE) lancets, 1 each by Other route Daily. Use as instructed, Disp: 100 each, Rfl: 10  •  levothyroxine (SYNTHROID, LEVOTHROID) 75 MCG tablet, TAKE ONE TABLET BY MOUTH DAILY, Disp: 30 tablet, Rfl: 1  •  Prenatal Vit-Fe Fumarate-FA (PRENATAL, CLASSIC, VITAMIN) 28-0.8 MG tablet tablet, Take  by mouth Daily., Disp: , Rfl:   •  Semaglutide, 1 MG/DOSE, (Ozempic, 1 MG/DOSE,) 2 MG/1.5ML solution pen-injector, Inject 1 mg under the skin into the appropriate area as directed 1 (One) Time Per Week., Disp: 3 mL, Rfl: 5  •  vitamin D (ERGOCALCIFEROL) 1.25 MG (74773 UT) capsule capsule, Take 1 capsule by mouth 1 (One) Time Per Week., Disp: 12 capsule, Rfl: 0  •  azithromycin (ZITHROMAX) 500 MG tablet, Take 1 tablet by mouth 1 (One) Time for 1 dose., Disp: 2 tablet, Rfl: 0    Current Facility-Administered Medications:   •  cefTRIAXone (ROCEPHIN) 250 mg/ml in lidocaine 1% IM syringe (250 mg vial), 250 mg, Intramuscular, Once, Yasmin Hanna APRN    Objective  "  Vitals:    07/20/20 0902   BP: 116/84   Pulse: 86   Resp: 16   Temp: 100 °F (37.8 °C)   TempSrc: Temporal   SpO2: 98%   Weight: 84.8 kg (187 lb)   Height: 160 cm (63\")     Body mass index is 33.13 kg/m².  Physical Exam   Constitutional: She is oriented to person, place, and time. She appears well-developed and well-nourished. No distress.   Pulmonary/Chest: Effort normal. No accessory muscle usage. No respiratory distress.   Neurological: She is alert and oriented to person, place, and time.   Skin: No erythema. No pallor.   Psychiatric: Her speech is normal and behavior is normal. Judgment and thought content normal. She exhibits a depressed mood.   Nursing note and vitals reviewed.      Assessment/Plan   Problem List Items Addressed This Visit        Digestive    Vitamin D deficiency    Relevant Medications    vitamin D (ERGOCALCIFEROL) 1.25 MG (50710 UT) capsule capsule    Other Relevant Orders    Vitamin D 25 Hydroxy       Endocrine    Type 2 diabetes mellitus without complication, without long-term current use of insulin (CMS/[facility])    Relevant Medications    glucose blood (FREESTYLE LITE) test strip    Insulin Pen Needle (PEN NEEDLES) 31G X 5 MM misc    Lancets (FREESTYLE) lancets    Ertugliflozin-metFORMIN HCl 7.5-1000 MG tablet    insulin degludec (TRESIBA FLEXTOUCH) 100 UNIT/ML solution pen-injector injection    Semaglutide, 1 MG/DOSE, (Ozempic, 1 MG/DOSE,) 2 MG/1.5ML solution pen-injector    Other Relevant Orders    CBC & Differential    Comprehensive Metabolic Panel    Hypothyroid    Relevant Medications    atenolol (TENORMIN) 25 MG tablet    levothyroxine (SYNTHROID, LEVOTHROID) 75 MCG tablet    Other Relevant Orders    TSH Rfx On Abnormal To Free T4      Other Visit Diagnoses     STD exposure    -  Primary    Relevant Medications    azithromycin (ZITHROMAX) 500 MG tablet    cefTRIAXone (ROCEPHIN) 250 mg/ml in lidocaine 1% IM syringe (250 mg vial)    Other Relevant Orders    HIV-1 / O / 2 Ag / Antibody " 4th Generation    Hepatitis C Antibody        Yasmin was seen today for exposure to std.    Diagnoses and all orders for this visit:    STD exposure  -     HIV-1 / O / 2 Ag / Antibody 4th Generation  -     Hepatitis C Antibody  -     azithromycin (ZITHROMAX) 500 MG tablet; Take 1 tablet by mouth 1 (One) Time for 1 dose.  -     cefTRIAXone (ROCEPHIN) 250 mg/ml in lidocaine 1% IM syringe (250 mg vial)  One swab sent for STD and will do blood today for HIV and hep C.  We will treat today for gonorrhea.  Recommend Marriage counseling or individual counseling that may be covered by her insurance  Type 2 diabetes mellitus without complication, without long-term current use of insulin (CMS/[facility])  -     Semaglutide, 1 MG/DOSE, (Ozempic, 1 MG/DOSE,) 2 MG/1.5ML solution pen-injector; Inject 1 mg under the skin into the appropriate area as directed 1 (One) Time Per Week.  -     CBC & Differential  -     Comprehensive Metabolic Panel    Vitamin D deficiency  -     Vitamin D 25 Hydroxy    Hypothyroidism, unspecified type  -     TSH Rfx On Abnormal To Free T4             The patient was counseled regarding diagnostic results, impressions, prognosis, instructions for management, risk factor reductions, education, and importance of treatment compliance.  The patient verbalized understanding of and agreement with the plan of care.    Advised patient to call with any further questions and any new or worsening symptoms.     Return if symptoms worsen or fail to improve, for Next scheduled follow up.      JAIMIE Khan    Please note that portions of this note were completed with a voice recognition program. Efforts were made to edit the dictations, but occasionally words are mistranscribed.

## 2020-07-20 NOTE — TELEPHONE ENCOUNTER
PHARMACY CALLED REQUESTING A CLARIFICATION ON PTS MEDICATION  azithromycin (ZITHROMAX) 500 MG tablet    PHARMACY STATED IT WAS SENT OVER FOR TWO  BUT THE INSTRUCTIONS STATED TO TAKE ONE TABLET PER MOUTH    PLEASE ADVISE  MARAL FARRIS [address] [place] [address] [address] AT [facility] & [employer] [address] - [phone]  - [phone] FX

## 2020-08-17 DIAGNOSIS — E11.9 TYPE 2 DIABETES MELLITUS WITHOUT COMPLICATION, WITHOUT LONG-TERM CURRENT USE OF INSULIN (HCC): ICD-10-CM

## 2020-08-17 RX ORDER — ERTUGLIFLOZIN AND METFORMIN HYDROCHLORIDE 7.5; 1 MG/1; MG/1
TABLET, FILM COATED ORAL
Qty: 60 TABLET | Refills: 1 | Status: SHIPPED | OUTPATIENT
Start: 2020-08-17 | End: 2020-12-17 | Stop reason: CLARIF

## 2020-09-10 ENCOUNTER — OFFICE VISIT (OUTPATIENT)
Dept: INTERNAL MEDICINE | Facility: CLINIC | Age: 35
End: 2020-09-10

## 2020-09-10 ENCOUNTER — LAB REQUISITION (OUTPATIENT)
Dept: LAB | Facility: HOSPITAL | Age: 35
End: 2020-09-10

## 2020-09-10 VITALS
OXYGEN SATURATION: 97 % | WEIGHT: 179 LBS | TEMPERATURE: 97.5 F | SYSTOLIC BLOOD PRESSURE: 114 MMHG | DIASTOLIC BLOOD PRESSURE: 70 MMHG | HEIGHT: 63 IN | RESPIRATION RATE: 16 BRPM | HEART RATE: 75 BPM | BODY MASS INDEX: 31.71 KG/M2

## 2020-09-10 DIAGNOSIS — E11.9 TYPE 2 DIABETES MELLITUS WITHOUT COMPLICATION, WITHOUT LONG-TERM CURRENT USE OF INSULIN (HCC): Primary | ICD-10-CM

## 2020-09-10 DIAGNOSIS — Z13.220 LIPID SCREENING: ICD-10-CM

## 2020-09-10 DIAGNOSIS — F51.01 PRIMARY INSOMNIA: ICD-10-CM

## 2020-09-10 DIAGNOSIS — E11.9 TYPE 2 DIABETES MELLITUS WITHOUT COMPLICATIONS (HCC): ICD-10-CM

## 2020-09-10 DIAGNOSIS — E55.9 VITAMIN D DEFICIENCY: ICD-10-CM

## 2020-09-10 DIAGNOSIS — Z23 NEED FOR INFLUENZA VACCINATION: ICD-10-CM

## 2020-09-10 DIAGNOSIS — Z12.4 ENCOUNTER FOR PAPANICOLAOU SMEAR FOR CERVICAL CANCER SCREENING: ICD-10-CM

## 2020-09-10 DIAGNOSIS — I51.7 MILD CONCENTRIC LEFT VENTRICULAR HYPERTROPHY (LVH): ICD-10-CM

## 2020-09-10 DIAGNOSIS — I10 ESSENTIAL HYPERTENSION: ICD-10-CM

## 2020-09-10 DIAGNOSIS — E03.8 OTHER SPECIFIED HYPOTHYROIDISM: ICD-10-CM

## 2020-09-10 PROCEDURE — 90686 IIV4 VACC NO PRSV 0.5 ML IM: CPT | Performed by: NURSE PRACTITIONER

## 2020-09-10 PROCEDURE — 90471 IMMUNIZATION ADMIN: CPT | Performed by: NURSE PRACTITIONER

## 2020-09-10 PROCEDURE — 36415 COLL VENOUS BLD VENIPUNCTURE: CPT | Performed by: NURSE PRACTITIONER

## 2020-09-10 PROCEDURE — 99214 OFFICE O/P EST MOD 30 MIN: CPT | Performed by: NURSE PRACTITIONER

## 2020-09-10 RX ORDER — ATENOLOL 25 MG/1
12.5 TABLET ORAL DAILY
Qty: 30 TABLET | Refills: 3 | Status: SHIPPED | OUTPATIENT
Start: 2020-09-10 | End: 2020-12-17 | Stop reason: SDUPTHER

## 2020-09-10 RX ORDER — TRAZODONE HYDROCHLORIDE 50 MG/1
50 TABLET ORAL NIGHTLY PRN
Qty: 30 TABLET | Refills: 2 | Status: SHIPPED | OUTPATIENT
Start: 2020-09-10 | End: 2022-09-20

## 2020-09-10 RX ORDER — MEDICAL SUPPLY, MISCELLANEOUS
1 EACH MISCELLANEOUS DAILY
Qty: 1 EACH | Refills: 0 | Status: SHIPPED | OUTPATIENT
Start: 2020-09-10 | End: 2021-06-02

## 2020-09-10 NOTE — PROGRESS NOTES
Subjective   Yasmin Singleton is a 34 y.o. female here today for DM/HTN.    Chief Complaint   Patient presents with   • Diabetes   Yasmin is here today to follow-up on diabetes, hypertension, PCOS, hypothyroidism.  Continues to lose weight.  Feels this is in large part due to lack of appetite.  On Ozempic.  Denies any chest pain, shortness of breath, lower extremity edema.  Due for repeat Pap smear.  Does continue to have intermittent insomnia.  Previously on trazodone but stopped as she had improvement of symptoms.    Review of Systems   Constitutional: Positive for fatigue and unexpected weight loss. Negative for activity change, appetite change, fever and unexpected weight gain.   Respiratory: Negative.    Cardiovascular: Negative for chest pain, palpitations and leg swelling.   Genitourinary: Negative.    Neurological: Negative for memory problem and confusion.   Psychiatric/Behavioral: Positive for dysphoric mood, sleep disturbance and stress. Negative for self-injury, suicidal ideas, negative for hyperactivity and depressed mood. The patient is not nervous/anxious.        Past Medical History:   Diagnosis Date   • Diabetes (CMS/HCC)    • GERD (gastroesophageal reflux disease)    • History of recurrent UTIs    • Seasonal allergies      Family History   Problem Relation Age of Onset   • Cancer Father         Unsure what kind   • Hypertension Father    • Stroke Father    • Kidney failure Mother      Past Surgical History:   Procedure Laterality Date   • WISDOM TOOTH EXTRACTION  2015     Social History     Socioeconomic History   • Marital status: Single     Spouse name: Not on file   • Number of children: Not on file   • Years of education: Not on file   • Highest education level: Not on file   Tobacco Use   • Smoking status: Current Every Day Smoker     Packs/day: 0.50     Years: 12.00     Pack years: 6.00     Types: Cigarettes   • Smokeless tobacco: Never Used   Substance and Sexual Activity   • Alcohol use: Yes      Comment: on occasion, at most 1-2 drinks a month   • Drug use: Yes     Frequency: 4.0 times per week     Types: Marijuana   • Sexual activity: Yes     Partners: Male     Birth control/protection: None         Current Outpatient Medications:   •  atenolol (TENORMIN) 25 MG tablet, Take 0.5 tablets by mouth Daily., Disp: 30 tablet, Rfl: 3  •  chlorhexidine (HIBICLENS) 4 % external liquid, Apply  topically to the appropriate area as directed Every Other Day., Disp: 473 mL, Rfl: 5  •  famotidine (PEPCID) 20 MG tablet, TAKE ONE TABLET BY MOUTH TWICE A DAY AS NEEDED FOR HEARTBURN, Disp: 60 tablet, Rfl: 3  •  glucose blood (FREESTYLE LITE) test strip, 1 each by Other route 2 (Two) Times a Day. Use as instructed, Disp: 100 each, Rfl: 12  •  insulin degludec (TRESIBA FLEXTOUCH) 100 UNIT/ML solution pen-injector injection, Inject 10 Units under the skin into the appropriate area as directed Every Night., Disp: 3 pen, Rfl: 5  •  Insulin Pen Needle (PEN NEEDLES) 31G X 5 MM misc, 1.8 mL Daily., Disp: 100 each, Rfl: 2  •  Lancets (FREESTYLE) lancets, 1 each by Other route Daily. Use as instructed, Disp: 100 each, Rfl: 10  •  levothyroxine (SYNTHROID, LEVOTHROID) 75 MCG tablet, TAKE ONE TABLET BY MOUTH DAILY, Disp: 30 tablet, Rfl: 1  •  Prenatal Vit-Fe Fumarate-FA (PRENATAL, CLASSIC, VITAMIN) 28-0.8 MG tablet tablet, Take  by mouth Daily., Disp: , Rfl:   •  SEGLUROMET 7.5-1000 MG tablet, TAKE ONE TABLET BY MOUTH TWICE A DAY, Disp: 60 tablet, Rfl: 1  •  Semaglutide, 1 MG/DOSE, (Ozempic, 1 MG/DOSE,) 2 MG/1.5ML solution pen-injector, Inject 1 mg under the skin into the appropriate area as directed 1 (One) Time Per Week., Disp: 3 mL, Rfl: 5  •  vitamin D (ERGOCALCIFEROL) 1.25 MG (40796 UT) capsule capsule, Take 1 capsule by mouth 1 (One) Time Per Week., Disp: 12 capsule, Rfl: 0  •  Blood Pressure Monitoring (B-D ASSURE BPM/DELUXE ARM CUFF) misc, 1 each Daily., Disp: 1 each, Rfl: 0  •  traZODone (DESYREL) 50 MG tablet, Take 1 tablet by  "mouth At Night As Needed for Sleep., Disp: 30 tablet, Rfl: 2    Objective   Vitals:    09/10/20 1012   BP: 114/70   Pulse: 75   Resp: 16   Temp: 97.5 °F (36.4 °C)   TempSrc: Temporal   SpO2: 97%   Weight: 81.2 kg (179 lb)   Height: 160 cm (63\")     Body mass index is 31.71 kg/m².  Physical Exam   Constitutional: She is oriented to person, place, and time. She appears well-developed and well-nourished. No distress.   Eyes: Pupils are equal, round, and reactive to light.   Neck: Neck supple. No thyromegaly present.   Cardiovascular: Normal rate and regular rhythm.   Pulmonary/Chest: Effort normal and breath sounds normal.   Abdominal: Hernia confirmed negative in the right inguinal area and confirmed negative in the left inguinal area.   Genitourinary: Cervix exhibits no motion tenderness, no discharge and no friability.   Musculoskeletal: She exhibits no edema.   Lymphadenopathy: No inguinal adenopathy noted on the right or left side.   Neurological: She is alert and oriented to person, place, and time.   Skin: Skin is warm and dry. Capillary refill takes 2 to 3 seconds. She is not diaphoretic.   Psychiatric: She has a normal mood and affect. Her behavior is normal. Judgment and thought content normal.   Nursing note and vitals reviewed.      Assessment/Plan   Problem List Items Addressed This Visit        Cardiovascular and Mediastinum    Mild concentric left ventricular hypertrophy (LVH)    Relevant Medications    atenolol (TENORMIN) 25 MG tablet    Essential hypertension    Relevant Medications    atenolol (TENORMIN) 25 MG tablet    Blood Pressure Monitoring (B-D ASSURE BPM/DELUXE ARM CUFF) misc       Digestive    Vitamin D deficiency    Relevant Medications    vitamin D (ERGOCALCIFEROL) 1.25 MG (15323 UT) capsule capsule       Endocrine    Type 2 diabetes mellitus without complication, without long-term current use of insulin (CMS/[facility]) - Primary    Relevant Medications    glucose blood (FREESTYLE LITE) test strip "    Insulin Pen Needle (PEN NEEDLES) 31G X 5 MM misc    Lancets (FREESTYLE) lancets    insulin degludec (TRESIBA FLEXTOUCH) 100 UNIT/ML solution pen-injector injection    Semaglutide, 1 MG/DOSE, (Ozempic, 1 MG/DOSE,) 2 MG/1.5ML solution pen-injector    SEGLUROMET 7.5-1000 MG tablet    Other Relevant Orders    CBC & Differential    Comprehensive Metabolic Panel    Hemoglobin A1c    Hypothyroid    Relevant Medications    levothyroxine (SYNTHROID, LEVOTHROID) 75 MCG tablet    atenolol (TENORMIN) 25 MG tablet    Other Relevant Orders    TSH Rfx On Abnormal To Free T4       Other    Primary insomnia    Relevant Medications    traZODone (DESYREL) 50 MG tablet      Other Visit Diagnoses     Lipid screening        Relevant Orders    Lipid Panel    Need for influenza vaccination        Relevant Orders    Fluarix Quad >6 Months ([id number]) (Completed)    Encounter for Papanicolaou smear for cervical cancer screening            Yasmin was seen today for diabetes.    Diagnoses and all orders for this visit:    Type 2 diabetes mellitus without complication, without long-term current use of insulin (CMS/[facility])  -     CBC & Differential  -     Comprehensive Metabolic Panel  -     Hemoglobin A1c  Congratulated on weight loss, lack of appetite could be due to Ozempic or overcorrected thyroid, labs today.  Childbearing age-avoid ACE and statin  Other specified hypothyroidism  -     TSH Rfx On Abnormal To Free T4  See above    Essential hypertension  -     atenolol (TENORMIN) 25 MG tablet; Take 0.5 tablets by mouth Daily.  -     Blood Pressure Monitoring (B-D ASSURE BPM/DELUXE ARM CUFF) misc; 1 each Daily.  BP stable for quite some time.  Will reduce to half tablet to reduce medication burden as she has lost weight.  Blood pressure cuff sent to [id number] in 3 months  Mild concentric left ventricular hypertrophy (LVH)  Childbearing age, avoid ACE  Lipid screening  -     Lipid Panel    Primary insomnia  -     traZODone (DESYREL) 50  MG tablet; Take 1 tablet by mouth At Night As Needed for Sleep.    Need for influenza vaccination  -     Fluarix Quad >6 Months ([id number])    Encounter for Papanicolaou smear for cervical cancer screening             The patient was counseled regarding diagnostic results, impressions, prognosis, instructions for management, risk factor reductions, education, and importance of treatment compliance.  The patient verbalized understanding of and agreement with the plan of care.    Advised patient to call with any further questions and any new or worsening symptoms.     Return in about 3 months (around 12/10/2020) for Recheck, Physical w/Next Visit.      JAIMIE Khan    Please note that portions of this note were completed with a voice recognition program. Efforts were made to edit the dictations, but occasionally words are mistranscribed.

## 2020-09-11 DIAGNOSIS — E03.8 OTHER SPECIFIED HYPOTHYROIDISM: ICD-10-CM

## 2020-09-11 LAB
ALBUMIN SERPL-MCNC: 4.6 G/DL (ref 3.5–5.2)
ALBUMIN/GLOB SERPL: 2.2 G/DL
ALP SERPL-CCNC: 67 U/L (ref 39–117)
ALT SERPL-CCNC: 21 U/L (ref 1–33)
AST SERPL-CCNC: 18 U/L (ref 1–32)
BASOPHILS # BLD AUTO: NORMAL 10*3/UL
BILIRUB SERPL-MCNC: 0.3 MG/DL (ref 0–1.2)
BUN SERPL-MCNC: 6 MG/DL (ref 6–20)
BUN/CREAT SERPL: 10 (ref 7–25)
CALCIUM SERPL-MCNC: 9 MG/DL (ref 8.6–10.5)
CHLORIDE SERPL-SCNC: 103 MMOL/L (ref 98–107)
CHOLEST SERPL-MCNC: 163 MG/DL (ref 0–200)
CO2 SERPL-SCNC: 24.4 MMOL/L (ref 22–29)
CREAT SERPL-MCNC: 0.6 MG/DL (ref 0.57–1)
DIFFERENTIAL COMMENT: ABNORMAL
EOSINOPHIL # BLD AUTO: NORMAL 10*3/UL
EOSINOPHIL # BLD MANUAL: 0.15 10*3/MM3 (ref 0–0.4)
EOSINOPHIL NFR BLD AUTO: NORMAL %
EOSINOPHIL NFR BLD MANUAL: 2 % (ref 0.3–6.2)
ERYTHROCYTE [DISTWIDTH] IN BLOOD BY AUTOMATED COUNT: 14.2 % (ref 12.3–15.4)
GLOBULIN SER CALC-MCNC: 2.1 GM/DL
GLUCOSE SERPL-MCNC: 102 MG/DL (ref 65–99)
HBA1C MFR BLD: 5.8 % (ref 4.8–5.6)
HCT VFR BLD AUTO: 43.7 % (ref 34–46.6)
HDLC SERPL-MCNC: 40 MG/DL (ref 40–60)
HGB BLD-MCNC: 14 G/DL (ref 12–15.9)
LDLC SERPL CALC-MCNC: 104 MG/DL (ref 0–100)
LYMPHOCYTES # BLD AUTO: NORMAL 10*3/UL
LYMPHOCYTES # BLD MANUAL: 4.23 10*3/MM3 (ref 0.7–3.1)
LYMPHOCYTES NFR BLD AUTO: NORMAL %
LYMPHOCYTES NFR BLD MANUAL: 56 % (ref 19.6–45.3)
MCH RBC QN AUTO: 28.9 PG (ref 26.6–33)
MCHC RBC AUTO-ENTMCNC: 32 G/DL (ref 31.5–35.7)
MCV RBC AUTO: 90.1 FL (ref 79–97)
MONOCYTES # BLD MANUAL: 0.3 10*3/MM3 (ref 0.1–0.9)
MONOCYTES NFR BLD AUTO: NORMAL %
MONOCYTES NFR BLD MANUAL: 4 % (ref 5–12)
NEUTROPHILS # BLD MANUAL: 2.87 10*3/MM3 (ref 1.7–7)
NEUTROPHILS NFR BLD AUTO: NORMAL %
NEUTROPHILS NFR BLD MANUAL: 38 % (ref 42.7–76)
PLATELET # BLD AUTO: 254 10*3/MM3 (ref 140–450)
PLATELET BLD QL SMEAR: ABNORMAL
POTASSIUM SERPL-SCNC: 3.9 MMOL/L (ref 3.5–5.2)
PROT SERPL-MCNC: 6.7 G/DL (ref 6–8.5)
RBC # BLD AUTO: 4.85 10*6/MM3 (ref 3.77–5.28)
RBC MORPH BLD: ABNORMAL
SODIUM SERPL-SCNC: 138 MMOL/L (ref 136–145)
TRIGL SERPL-MCNC: 96 MG/DL (ref 0–150)
TSH SERPL DL<=0.005 MIU/L-ACNC: 1.54 UIU/ML (ref 0.27–4.2)
VLDLC SERPL CALC-MCNC: 19.2 MG/DL
WBC # BLD AUTO: 7.55 10*3/MM3 (ref 3.4–10.8)

## 2020-09-11 RX ORDER — LEVOTHYROXINE SODIUM 0.07 MG/1
75 TABLET ORAL DAILY
Qty: 90 TABLET | Refills: 1 | Status: SHIPPED | OUTPATIENT
Start: 2020-09-11 | End: 2020-12-17 | Stop reason: SDUPTHER

## 2020-10-16 DIAGNOSIS — R12 HEARTBURN: ICD-10-CM

## 2020-10-16 RX ORDER — FAMOTIDINE 20 MG/1
TABLET, FILM COATED ORAL
Qty: 180 TABLET | Refills: 2 | Status: SHIPPED | OUTPATIENT
Start: 2020-10-16 | End: 2020-12-17 | Stop reason: SDUPTHER

## 2020-12-17 ENCOUNTER — LAB REQUISITION (OUTPATIENT)
Dept: LAB | Facility: HOSPITAL | Age: 35
End: 2020-12-17

## 2020-12-17 ENCOUNTER — OFFICE VISIT (OUTPATIENT)
Dept: INTERNAL MEDICINE | Facility: CLINIC | Age: 35
End: 2020-12-17

## 2020-12-17 VITALS
HEIGHT: 63 IN | OXYGEN SATURATION: 98 % | TEMPERATURE: 97.1 F | BODY MASS INDEX: 31.36 KG/M2 | DIASTOLIC BLOOD PRESSURE: 78 MMHG | SYSTOLIC BLOOD PRESSURE: 118 MMHG | HEART RATE: 100 BPM | WEIGHT: 177 LBS

## 2020-12-17 DIAGNOSIS — Z00.00 ANNUAL PHYSICAL EXAM: Primary | ICD-10-CM

## 2020-12-17 DIAGNOSIS — E03.8 OTHER SPECIFIED HYPOTHYROIDISM: ICD-10-CM

## 2020-12-17 DIAGNOSIS — M25.511 CHRONIC RIGHT SHOULDER PAIN: ICD-10-CM

## 2020-12-17 DIAGNOSIS — R12 HEARTBURN: ICD-10-CM

## 2020-12-17 DIAGNOSIS — E11.9 TYPE 2 DIABETES MELLITUS WITHOUT COMPLICATION, WITHOUT LONG-TERM CURRENT USE OF INSULIN (HCC): ICD-10-CM

## 2020-12-17 DIAGNOSIS — G89.29 CHRONIC RIGHT SHOULDER PAIN: ICD-10-CM

## 2020-12-17 DIAGNOSIS — Z00.00 ROUTINE GENERAL MEDICAL EXAMINATION AT A HEALTH CARE FACILITY: ICD-10-CM

## 2020-12-17 DIAGNOSIS — I10 ESSENTIAL HYPERTENSION: ICD-10-CM

## 2020-12-17 PROCEDURE — 36415 COLL VENOUS BLD VENIPUNCTURE: CPT | Performed by: NURSE PRACTITIONER

## 2020-12-17 PROCEDURE — 99395 PREV VISIT EST AGE 18-39: CPT | Performed by: NURSE PRACTITIONER

## 2020-12-17 RX ORDER — LEVOTHYROXINE SODIUM 0.07 MG/1
75 TABLET ORAL DAILY
Qty: 90 TABLET | Refills: 1 | Status: SHIPPED | OUTPATIENT
Start: 2020-12-17 | End: 2021-09-24

## 2020-12-17 RX ORDER — PRENATAL VIT NO.126/IRON/FOLIC 28MG-0.8MG
1 TABLET ORAL DAILY
Qty: 30 TABLET | Refills: 11 | Status: SHIPPED | OUTPATIENT
Start: 2020-12-17 | End: 2022-09-20

## 2020-12-17 RX ORDER — ATENOLOL 25 MG/1
12.5 TABLET ORAL DAILY
Qty: 30 TABLET | Refills: 3 | Status: SHIPPED | OUTPATIENT
Start: 2020-12-17 | End: 2021-06-02

## 2020-12-17 RX ORDER — FAMOTIDINE 20 MG/1
20 TABLET, FILM COATED ORAL 2 TIMES DAILY PRN
Qty: 180 TABLET | Refills: 2 | Status: SHIPPED | OUTPATIENT
Start: 2020-12-17 | End: 2022-03-21

## 2020-12-17 NOTE — PROGRESS NOTES
"Subjective   Yasmin Singleton is a 35 y.o. female here today for annual exam.    Chief Complaint   Patient presents with   • Annual Exam     Her overall health is: improving  She does get regular exercise at work  Immunizations are:needs tdap- advised to report to health dept  LMP:11/25  PAP: 2020  She is sexually active. No concern for STDs  Prenatal/ Calcium/ Vit D: counseled   She does see a dentist   Her diet is described as: \"not great\" but reducing carbs  She describes her alcohol intake as: none  She does use tobacco- about 1ppd  Her cardiovascular risk is: elevated  She does wear a seatbelt regularly.  She does wear sunscreen regularly when outdoors.    Continues to have shoulder pain, limited range of motion.  Did see urgent care for this and had x-ray that was normal.  Review of Systems   Constitutional: Negative for activity change, appetite change, chills, fever, unexpected weight gain and unexpected weight loss.   HENT: Negative for congestion, ear pain, nosebleeds, postnasal drip, sore throat, trouble swallowing and voice change.    Eyes: Negative for blurred vision, pain, itching and visual disturbance.   Respiratory: Negative for cough and shortness of breath.    Cardiovascular: Negative for chest pain and palpitations.   Gastrointestinal: Positive for nausea, GERD and indigestion. Negative for blood in stool, diarrhea and vomiting.   Endocrine: Negative for polydipsia, polyphagia and polyuria.   Genitourinary: Negative for dysuria, flank pain, frequency, genital sores, hematuria and urgency.   Musculoskeletal: Positive for arthralgias. Negative for myalgias.   Skin: Negative for rash and skin lesions.   Allergic/Immunologic: Negative for environmental allergies, food allergies and immunocompromised state.   Neurological: Negative for dizziness, seizures, weakness, headache, memory problem and confusion.   Psychiatric/Behavioral: Negative for self-injury, sleep disturbance, suicidal ideas and depressed " mood. The patient is not nervous/anxious.        Past Medical History:   Diagnosis Date   • Diabetes (CMS/HCC)    • GERD (gastroesophageal reflux disease)    • History of recurrent UTIs    • Seasonal allergies      Family History   Problem Relation Age of Onset   • Cancer Father         Unsure what kind   • Hypertension Father    • Stroke Father    • Kidney failure Mother      Past Surgical History:   Procedure Laterality Date   • WISDOM TOOTH EXTRACTION  2015     Social History     Socioeconomic History   • Marital status: Single     Spouse name: Not on file   • Number of children: Not on file   • Years of education: Not on file   • Highest education level: Not on file   Tobacco Use   • Smoking status: Current Every Day Smoker     Packs/day: 0.50     Years: 12.00     Pack years: 6.00     Types: Cigarettes   • Smokeless tobacco: Never Used   Substance and Sexual Activity   • Alcohol use: Yes     Comment: on occasion, at most 1-2 drinks a month   • Drug use: Yes     Frequency: 4.0 times per week     Types: Marijuana   • Sexual activity: Yes     Partners: Male     Birth control/protection: None         Current Outpatient Medications:   •  atenolol (TENORMIN) 25 MG tablet, Take 0.5 tablets by mouth Daily., Disp: 30 tablet, Rfl: 3  •  Blood Pressure Monitoring (B-D ASSURE BPM/DELUXE ARM CUFF) misc, 1 each Daily., Disp: 1 each, Rfl: 0  •  chlorhexidine (HIBICLENS) 4 % external liquid, Apply  topically to the appropriate area as directed Every Other Day., Disp: 473 mL, Rfl: 5  •  famotidine (PEPCID) 20 MG tablet, Take 1 tablet by mouth 2 (Two) Times a Day As Needed for Heartburn., Disp: 180 tablet, Rfl: 2  •  glucose blood (FREESTYLE LITE) test strip, 1 each by Other route 2 (Two) Times a Day. Use as instructed, Disp: 100 each, Rfl: 12  •  Insulin Pen Needle (PEN NEEDLES) 31G X 5 MM misc, 1.8 mL Daily., Disp: 100 each, Rfl: 2  •  Lancets (FREESTYLE) lancets, 1 each by Other route Daily. Use as instructed, Disp: 100 each,  "Rfl: 10  •  levothyroxine (SYNTHROID, LEVOTHROID) 75 MCG tablet, Take 1 tablet by mouth Daily., Disp: 90 tablet, Rfl: 1  •  prenatal vitamin (prenatal, CLASSIC, vitamin) tablet, Take 1 tablet by mouth Daily., Disp: 30 tablet, Rfl: 11  •  traZODone (DESYREL) 50 MG tablet, Take 1 tablet by mouth At Night As Needed for Sleep., Disp: 30 tablet, Rfl: 2  •  vitamin D (ERGOCALCIFEROL) 1.25 MG (25981 UT) capsule capsule, Take 1 capsule by mouth 1 (One) Time Per Week., Disp: 12 capsule, Rfl: 0  •  Dulaglutide 0.75 MG/0.5ML solution pen-injector, Inject 0.75 mg under the skin into the appropriate area as directed 1 (One) Time Per Week., Disp: 1 mL, Rfl: 5  •  Empagliflozin-metFORMIN HCl 5-1000 MG tablet, Take 1 tablet by mouth 2 (Two) Times a Day., Disp: 60 tablet, Rfl: 5    Objective   Vitals:    12/17/20 1353   BP: 118/78   BP Location: Left arm   Patient Position: Sitting   Cuff Size: Large Adult   Pulse: 100   Temp: 97.1 °F (36.2 °C)   TempSrc: Temporal   SpO2: 98%   Weight: 80.3 kg (177 lb)   Height: 160 cm (63\")     Body mass index is 31.35 kg/m².  Physical Exam  Vitals signs and nursing note reviewed.   Constitutional:       General: She is not in acute distress.     Appearance: Normal appearance. She is well-developed. She is not diaphoretic.   HENT:      Head: Normocephalic.      Right Ear: Tympanic membrane and external ear normal.      Left Ear: Tympanic membrane and external ear normal.      Nose: Nose normal.      Right Sinus: No maxillary sinus tenderness or frontal sinus tenderness.      Left Sinus: No maxillary sinus tenderness or frontal sinus tenderness.      Mouth/Throat:      Pharynx: No oropharyngeal exudate.   Eyes:      General: No scleral icterus.     Conjunctiva/sclera: Conjunctivae normal.      Pupils: Pupils are equal, round, and reactive to light.   Neck:      Musculoskeletal: Normal range of motion and neck supple. No neck rigidity.      Thyroid: No thyroid mass or thyromegaly.      Vascular: No " carotid bruit.      Trachea: No tracheal deviation.   Cardiovascular:      Rate and Rhythm: Normal rate and regular rhythm.      Heart sounds: Normal heart sounds. No murmur. No friction rub. No gallop.    Pulmonary:      Effort: Pulmonary effort is normal. No respiratory distress.      Breath sounds: Normal breath sounds. No decreased breath sounds, wheezing or rales.   Abdominal:      General: Bowel sounds are normal. There is no distension.      Palpations: Abdomen is soft. There is no mass.      Tenderness: There is no abdominal tenderness. There is no guarding or rebound.      Hernia: No hernia is present.   Musculoskeletal:         General: No tenderness or deformity.      Right shoulder: She exhibits decreased range of motion and pain. She exhibits no swelling and no effusion.      Right lower leg: No edema.      Left lower leg: No edema.      Comments: ROM normal with all major joints   Lymphadenopathy:      Head:      Right side of head: No submental, submandibular, tonsillar, preauricular, posterior auricular or occipital adenopathy.      Left side of head: No submental, submandibular, tonsillar, preauricular, posterior auricular or occipital adenopathy.      Cervical: No cervical adenopathy.      Right cervical: No superficial, deep or posterior cervical adenopathy.     Left cervical: No superficial, deep or posterior cervical adenopathy.   Skin:     General: Skin is warm and dry.      Capillary Refill: Capillary refill takes 2 to 3 seconds.      Findings: No rash.      Nails: There is no clubbing.     Neurological:      Mental Status: She is alert and oriented to person, place, and time.      GCS: GCS eye subscore is 4. GCS verbal subscore is 5. GCS motor subscore is 6.      Cranial Nerves: No cranial nerve deficit.      Sensory: No sensory deficit.      Motor: No tremor, atrophy or abnormal muscle tone.      Coordination: Coordination normal.      Gait: Gait normal.   Psychiatric:         Attention and  Perception: Attention normal.         Mood and Affect: Mood normal.         Speech: Speech normal.         Behavior: Behavior normal. Behavior is cooperative.         Thought Content: Thought content normal.         Cognition and Memory: Cognition normal.         Judgment: Judgment normal.         Assessment/Plan   Problem List Items Addressed This Visit        Cardiovascular and Mediastinum    Essential hypertension    Relevant Medications    Blood Pressure Monitoring (B-D ASSURE BPM/DELUXE ARM CUFF) misc    atenolol (TENORMIN) 25 MG tablet       Digestive    Heartburn    Relevant Medications    famotidine (PEPCID) 20 MG tablet       Endocrine    Type 2 diabetes mellitus without complication, without long-term current use of insulin (CMS/[facility])    Relevant Medications    glucose blood (FREESTYLE LITE) test strip    Insulin Pen Needle (PEN NEEDLES) 31G X 5 MM misc    Lancets (FREESTYLE) lancets    Dulaglutide 0.75 MG/0.5ML solution pen-injector    Empagliflozin-metFORMIN HCl 5-1000 MG tablet    Hypothyroid    Relevant Medications    levothyroxine (SYNTHROID, LEVOTHROID) 75 MCG tablet    atenolol (TENORMIN) 25 MG tablet      Other Visit Diagnoses     Annual physical exam    -  Primary    Relevant Medications    prenatal vitamin (prenatal, CLASSIC, vitamin) tablet    Other Relevant Orders    CBC & Differential    Comprehensive Metabolic Panel    Hemoglobin A1c    TSH Rfx On Abnormal To Free T4    Lipid Panel    Vitamin D 25 Hydroxy    Chronic right shoulder pain        Relevant Orders    Ambulatory Referral to Orthopedic Surgery        Diagnoses and all orders for this visit:    1. Annual physical exam (Primary)  -     prenatal vitamin (prenatal, CLASSIC, vitamin) tablet; Take 1 tablet by mouth Daily.  Dispense: 30 tablet; Refill: 11  -     CBC & Differential  -     Comprehensive Metabolic Panel  -     Hemoglobin A1c  -     TSH Rfx On Abnormal To Free T4  -     Lipid Panel  -     Vitamin D 25 Hydroxy  Counseled  regarding: age-appropriate screening labs and tests, wearing seatbelt and sunscreen regularly  Discussed: regular exercise and diet changes to promote healthy weight, checking skin regularly for abnormal moles and lesions    2. Type 2 diabetes mellitus without complication, without long-term current use of insulin (CMS/[facility])  -     Dulaglutide 0.75 MG/0.5ML solution pen-injector; Inject 0.75 mg under the skin into the appropriate area as directed 1 (One) Time Per Week.  Dispense: 1 mL; Refill: 5  -     Empagliflozin-metFORMIN HCl 5-1000 MG tablet; Take 1 tablet by mouth 2 (Two) Times a Day.  Dispense: 60 tablet; Refill: 5  - change meds per insurance   3. Other specified hypothyroidism  -     levothyroxine (SYNTHROID, LEVOTHROID) 75 MCG tablet; Take 1 tablet by mouth Daily.  Dispense: 90 tablet; Refill: 1    4. Heartburn  -     famotidine (PEPCID) 20 MG tablet; Take 1 tablet by mouth 2 (Two) Times a Day As Needed for Heartburn.  Dispense: 180 tablet; Refill: 2    5. Essential hypertension  -     atenolol (TENORMIN) 25 MG tablet; Take 0.5 tablets by mouth Daily.  Dispense: 30 tablet; Refill: 3    6. Chronic right shoulder pain  -     Ambulatory Referral to Orthopedic Surgery             The patient was counseled regarding diagnostic results, impressions, prognosis, instructions for management, risk factor reductions, education, and importance of treatment compliance.  The patient verbalized understanding of and agreement with the plan of care.    Advised patient to call with any further questions and any new or worsening symptoms.     Return in about 3 months (around 3/17/2021), or if symptoms worsen or fail to improve.      JAIMIE Khan    Please note that portions of this note were completed with a voice recognition program. Efforts were made to edit the dictations, but occasionally words are mistranscribed.

## 2020-12-18 DIAGNOSIS — E55.9 VITAMIN D DEFICIENCY: ICD-10-CM

## 2020-12-18 LAB
25(OH)D3+25(OH)D2 SERPL-MCNC: 12.3 NG/ML (ref 30–100)
ALBUMIN SERPL-MCNC: 4.5 G/DL (ref 3.8–4.8)
ALBUMIN/GLOB SERPL: 1.7 {RATIO} (ref 1.2–2.2)
ALP SERPL-CCNC: 66 IU/L (ref 39–117)
ALT SERPL-CCNC: 20 IU/L (ref 0–32)
AST SERPL-CCNC: 14 IU/L (ref 0–40)
BASOPHILS # BLD AUTO: 0 X10E3/UL (ref 0–0.2)
BASOPHILS NFR BLD AUTO: 1 %
BILIRUB SERPL-MCNC: 0.4 MG/DL (ref 0–1.2)
BUN SERPL-MCNC: 8 MG/DL (ref 6–20)
BUN/CREAT SERPL: 12 (ref 9–23)
CALCIUM SERPL-MCNC: 9.4 MG/DL (ref 8.7–10.2)
CHLORIDE SERPL-SCNC: 105 MMOL/L (ref 96–106)
CHOLEST SERPL-MCNC: 173 MG/DL (ref 100–199)
CO2 SERPL-SCNC: 19 MMOL/L (ref 20–29)
CREAT SERPL-MCNC: 0.65 MG/DL (ref 0.57–1)
EOSINOPHIL # BLD AUTO: 0.2 X10E3/UL (ref 0–0.4)
EOSINOPHIL NFR BLD AUTO: 3 %
ERYTHROCYTE [DISTWIDTH] IN BLOOD BY AUTOMATED COUNT: 14.8 % (ref 11.7–15.4)
GLOBULIN SER CALC-MCNC: 2.7 G/DL (ref 1.5–4.5)
GLUCOSE SERPL-MCNC: 127 MG/DL (ref 65–99)
HBA1C MFR BLD: 6.2 % (ref 4.8–5.6)
HCT VFR BLD AUTO: 42 % (ref 34–46.6)
HDLC SERPL-MCNC: 44 MG/DL
HGB BLD-MCNC: 14.4 G/DL (ref 11.1–15.9)
IMM GRANULOCYTES # BLD AUTO: 0 X10E3/UL (ref 0–0.1)
IMM GRANULOCYTES NFR BLD AUTO: 0 %
LDLC SERPL CALC-MCNC: 116 MG/DL (ref 0–99)
LYMPHOCYTES # BLD AUTO: 3.7 X10E3/UL (ref 0.7–3.1)
LYMPHOCYTES NFR BLD AUTO: 48 %
MCH RBC QN AUTO: 30.1 PG (ref 26.6–33)
MCHC RBC AUTO-ENTMCNC: 34.3 G/DL (ref 31.5–35.7)
MCV RBC AUTO: 88 FL (ref 79–97)
MONOCYTES # BLD AUTO: 0.5 X10E3/UL (ref 0.1–0.9)
MONOCYTES NFR BLD AUTO: 6 %
NEUTROPHILS # BLD AUTO: 3.3 X10E3/UL (ref 1.4–7)
NEUTROPHILS NFR BLD AUTO: 42 %
PLATELET # BLD AUTO: 250 X10E3/UL (ref 150–450)
POTASSIUM SERPL-SCNC: 4.2 MMOL/L (ref 3.5–5.2)
PROT SERPL-MCNC: 7.2 G/DL (ref 6–8.5)
RBC # BLD AUTO: 4.78 X10E6/UL (ref 3.77–5.28)
SODIUM SERPL-SCNC: 139 MMOL/L (ref 134–144)
TRIGL SERPL-MCNC: 68 MG/DL (ref 0–149)
TSH SERPL DL<=0.005 MIU/L-ACNC: 0.79 UIU/ML (ref 0.45–4.5)
VLDLC SERPL CALC-MCNC: 13 MG/DL (ref 5–40)
WBC # BLD AUTO: 7.8 X10E3/UL (ref 3.4–10.8)

## 2020-12-18 RX ORDER — ERGOCALCIFEROL 1.25 MG/1
50000 CAPSULE ORAL WEEKLY
Qty: 12 CAPSULE | Refills: 0 | Status: SHIPPED | OUTPATIENT
Start: 2020-12-18 | End: 2021-06-02

## 2020-12-22 ENCOUNTER — OFFICE VISIT (OUTPATIENT)
Dept: ORTHOPEDIC SURGERY | Facility: CLINIC | Age: 35
End: 2020-12-22

## 2020-12-22 VITALS — HEIGHT: 63 IN | WEIGHT: 177 LBS | BODY MASS INDEX: 31.36 KG/M2 | OXYGEN SATURATION: 98 % | HEART RATE: 90 BPM

## 2020-12-22 DIAGNOSIS — M75.41 IMPINGEMENT SYNDROME OF RIGHT SHOULDER: ICD-10-CM

## 2020-12-22 DIAGNOSIS — M75.51 BURSITIS OF RIGHT SHOULDER: ICD-10-CM

## 2020-12-22 DIAGNOSIS — M25.511 RIGHT SHOULDER PAIN, UNSPECIFIED CHRONICITY: ICD-10-CM

## 2020-12-22 DIAGNOSIS — M75.00 DIABETIC FROZEN SHOULDER ASSOCIATED WITH TYPE 2 DIABETES MELLITUS (HCC): ICD-10-CM

## 2020-12-22 DIAGNOSIS — M75.01 ADHESIVE CAPSULITIS OF RIGHT SHOULDER: Primary | ICD-10-CM

## 2020-12-22 DIAGNOSIS — E11.618 DIABETIC FROZEN SHOULDER ASSOCIATED WITH TYPE 2 DIABETES MELLITUS (HCC): ICD-10-CM

## 2020-12-22 PROCEDURE — 20610 DRAIN/INJ JOINT/BURSA W/O US: CPT | Performed by: ORTHOPAEDIC SURGERY

## 2020-12-22 PROCEDURE — 99203 OFFICE O/P NEW LOW 30 MIN: CPT | Performed by: ORTHOPAEDIC SURGERY

## 2020-12-22 RX ORDER — LIDOCAINE HYDROCHLORIDE 10 MG/ML
5 INJECTION, SOLUTION EPIDURAL; INFILTRATION; INTRACAUDAL; PERINEURAL
Status: COMPLETED | OUTPATIENT
Start: 2020-12-22 | End: 2020-12-22

## 2020-12-22 RX ORDER — TRIAMCINOLONE ACETONIDE 40 MG/ML
40 INJECTION, SUSPENSION INTRA-ARTICULAR; INTRAMUSCULAR
Status: COMPLETED | OUTPATIENT
Start: 2020-12-22 | End: 2020-12-22

## 2020-12-22 RX ADMIN — LIDOCAINE HYDROCHLORIDE 5 ML: 10 INJECTION, SOLUTION EPIDURAL; INFILTRATION; INTRACAUDAL; PERINEURAL at 16:15

## 2020-12-22 RX ADMIN — TRIAMCINOLONE ACETONIDE 40 MG: 40 INJECTION, SUSPENSION INTRA-ARTICULAR; INTRAMUSCULAR at 16:15

## 2020-12-22 NOTE — PROGRESS NOTES
[facility] [place] - Forest He MD    Office Visit       Patient Name: Yasmin Singleton    Chief Complaint:   Chief Complaint   Patient presents with   • Right Shoulder - Pain       Referring Physician: No ref. provider found    History of Present Illness:   Yasmin Singleton is a 35 y.o. female who presents with right body part: shoulder Reason: pain.  Onset:Onset: atraumatic and gradual in nature. The issue has been ongoing for 4 month(s). Pain is a 5/10 on the pain scale. Pain is described as Pain Characterization: aching. Associated symptoms include Symptoms: pain and stiffness. The pain is worse with sleeping, working and trying to hold something in her hands ; pain medication and/or NSAID improve the pain. Previous treatments have included: NSAIDS and oral steroids. I have reviewed the patient's history of present illness as noted/entered above.    I have reviewed the patient's past medical history, surgical history, social history, family history, medications, and allergies as noted in the electronic medical record and as noted/entered.  I have reviewed the patient's review of systems as noted/enter and updated as noted in the patient's HPI.      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    Right shoulder pain and stiffness      Subjective   Subjective      Review of Systems   Constitutional: Positive for chills and fatigue. Negative for fever.   HENT: Negative.  Negative for congestion and dental problem.    Eyes: Negative.  Negative for blurred vision.   Respiratory: Negative.  Negative for shortness of  breath.    Cardiovascular: Negative.  Negative for leg swelling.   Gastrointestinal: Negative.  Negative for abdominal pain.   Endocrine: Negative.  Negative for polyuria.   Genitourinary: Negative.  Negative for difficulty urinating.   Musculoskeletal: Positive for arthralgias and neck pain.   Skin: Negative.    Allergic/Immunologic: Negative.    Neurological: Positive for weakness.   Hematological: Negative.  Negative for adenopathy.   Psychiatric/Behavioral: Positive for sleep disturbance. Negative for behavioral problems.        Past Medical History:   Past Medical History:   Diagnosis Date   • Diabetes (CMS/HCC)    • GERD (gastroesophageal reflux disease)    • History of recurrent UTIs    • Seasonal allergies        Past Surgical History:   Past Surgical History:   Procedure Laterality Date   • WISDOM TOOTH EXTRACTION  2015       Family History:   Family History   Problem Relation Age of Onset   • Cancer Father         Unsure what kind   • Hypertension Father    • Stroke Father    • Kidney failure Mother        Social History:   Social History     Socioeconomic History   • Marital status: Single     Spouse name: Not on file   • Number of children: Not on file   • Years of education: Not on file   • Highest education level: Not on file   Tobacco Use   • Smoking status: Current Every Day Smoker     Packs/day: 0.50     Years: 12.00     Pack years: 6.00     Types: Cigarettes   • Smokeless tobacco: Never Used   Substance and Sexual Activity   • Alcohol use: Yes     Comment: on occasion, at most 1-2 drinks a month   • Drug use: Yes     Frequency: 4.0 times per week     Types: Marijuana   • Sexual activity: Yes     Partners: Male     Birth control/protection: None       Medications:   Current Outpatient Medications:   •  atenolol (TENORMIN) 25 MG tablet, Take 0.5 tablets by mouth Daily., Disp: 30 tablet, Rfl: 3  •  Blood Pressure Monitoring (B-D ASSURE BPM/DELUXE ARM CUFF) misc, 1 each Daily., Disp: 1 each, Rfl: 0  •   "chlorhexidine (HIBICLENS) 4 % external liquid, Apply  topically to the appropriate area as directed Every Other Day., Disp: 473 mL, Rfl: 5  •  Dulaglutide 0.75 MG/0.5ML solution pen-injector, Inject 0.75 mg under the skin into the appropriate area as directed 1 (One) Time Per Week., Disp: 1 mL, Rfl: 5  •  Empagliflozin-metFORMIN HCl 5-1000 MG tablet, Take 1 tablet by mouth 2 (Two) Times a Day., Disp: 60 tablet, Rfl: 5  •  famotidine (PEPCID) 20 MG tablet, Take 1 tablet by mouth 2 (Two) Times a Day As Needed for Heartburn., Disp: 180 tablet, Rfl: 2  •  glucose blood (FREESTYLE LITE) test strip, 1 each by Other route 2 (Two) Times a Day. Use as instructed, Disp: 100 each, Rfl: 12  •  Insulin Pen Needle (PEN NEEDLES) 31G X 5 MM misc, 1.8 mL Daily., Disp: 100 each, Rfl: 2  •  Lancets (FREESTYLE) lancets, 1 each by Other route Daily. Use as instructed, Disp: 100 each, Rfl: 10  •  levothyroxine (SYNTHROID, LEVOTHROID) 75 MCG tablet, Take 1 tablet by mouth Daily., Disp: 90 tablet, Rfl: 1  •  prenatal vitamin (prenatal, CLASSIC, vitamin) tablet, Take 1 tablet by mouth Daily., Disp: 30 tablet, Rfl: 11  •  traZODone (DESYREL) 50 MG tablet, Take 1 tablet by mouth At Night As Needed for Sleep., Disp: 30 tablet, Rfl: 2  •  vitamin D (ERGOCALCIFEROL) 1.25 MG (87340 UT) capsule capsule, Take 1 capsule by mouth 1 (One) Time Per Week., Disp: 12 capsule, Rfl: 0    Allergies: No Known Allergies    The following portions of the patient's history were reviewed and updated as appropriate: allergies, current medications, past family history, past medical history, past social history, past surgical history and problem list.        Objective    Objective      Vital Signs:   Vitals:    12/22/20 1552   Pulse: 90   SpO2: 98%   Weight: 80.3 kg (177 lb)   Height: 160 cm (62.99\")       Ortho Exam:  General: no acute distress, comfortable  Vitals reviewed in chart  Head: normocephalic, atraumatic  Ears: no external drainage noted  Eyes: " extraocular muscles appear intact with good tracking  Psych: alert and oriented, normal appearing mood  Vascular: 2+ pulses symmetric, well perfused  Neurologic:  Sensation to light touch intact distally  Spine/Cervical exam: motion preserved  Dermatologic: skin not hot/red/swollen  Respiratory: breathing comfortably on room air, no intercostal retractions  Cardiovascular: regular rate and rhythm, well perfused      Musculoskeletal Exam    SIDE: RIGHT  Shoulder Exam:  Range of motion measurements (degrees)  Forward flexion/Abduction/External rotation at side/ER at 90/IR at 90/IR position  Active: 130/130/30/70/40/buttock  Passive: 150/150/40/70/40/buttock    Diminished internal rotation and external rotation  Painful arc of motion  No evidence of septic joint  Pain with forward flexion and abduction greater than 120  Impingement testing Neer's test - positive/painful  Impingement testing Hawkin's test - positive/painful  Rotator cuff testing Margi's test - mild pain but no obvious weakness, pain limited  Rotator cuff testing External rotation - no weakness  Rotator cuff testing Lag signs - absent  Rotator cuff testing Belly press - negative  Scapular dyskinesis - present, abnormal scapular motion      Results Review:   Imaging Results (Last 24 Hours)     ** No results found for the last 24 hours. **        I personally reviewed right shoulder 3 views from October 26, 2020 no acute bony findings noted    Procedures     RIGHT SHOULDER GLENOHUMERAL JOINT INJECTION:  Risks and benefits of a posterior shoulder glenohumeral joint injection were discussed and the patient desired to proceed. Verbal consent was obtained. The patient understood the risk of infection, potential skin changes, bump in blood glucose especially with diabetes, nerve injury, possibility of increased pain in the short term, and possible incomplete pain relief. Using sterile technique, the glenohumeral joint was injected with 1mL of 80mg/mL Depo Medrol  and 4cc of lidocaine with aspiration prior to injection. The patient tolerated the procedure without difficulty.  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 (CMS/[facility]) - Primary    Relevant Medications    Dulaglutide 0.75 MG/0.5ML solution pen-injector    Empagliflozin-metFORMIN HCl 5-1000 MG tablet    Other Relevant Orders    Ambulatory Referral to Physical Therapy Ortho, Evaluate and treat       Musculoskeletal and Integument    Bursitis of right shoulder    Relevant Orders    Ambulatory Referral to Physical Therapy Ortho, Evaluate and treat       Other    Impingement syndrome of right shoulder    Relevant Orders    Ambulatory Referral to Physical Therapy Ortho, Evaluate and treat      Other Visit Diagnoses     Right shoulder pain, unspecified chronicity        Relevant Orders    Large Joint Arthrocentesis: R glenohumeral    Ambulatory Referral to Physical Therapy Ortho, Evaluate and treat          RIGHT SHOULDER    Selective rest/activity modifications recommended while the shoulder recovers, although stretching and physical therapy are encouraged.    Physical therapy prescription provided and stretching program discussed    Steroid injection - a steroid injection can be beneficial and was offered.  Risks and benefits were discussed including a bump in blood glucose in the case of Diabetes.    Follow-up - the patient can schedule an appointment for 2 months pending progress with the nonoperative treatment program    Patient counseling was performed with a discussion of the following:  What is a frozen shoulder?  Frozen Shoulder or Idiopathic Adhesive Capsulitis - the patient has a diagnosis of idiopathic adhesive capsulitis or “frozen shoulder.”  We discussed that this condition can have variable “freezing” and “thawing” phases  that can be very painful.  Fortunately, this condition rarely requires operative intervention and responds to conservative measures gradually over time.  Unfortunately, I did  that the symptoms can last up to 6-12 months in some patients and frozen shoulder can return to the same shoulder or impact the other shoulder in the future.    What is our plan to help nonoperatively treat frozen shoulder?  We discussed a plan for selective rest/activity modification, NSAIDs - risks and benefits of these medications discussed, frozen shoulder exercises demonstrated with emphasis on range of motion and physical therapy prescription given, and the option of a corticosteroid injection.  The patient may be a candidate for a 6-day Medrol dose pack and then start an NSAID after the Medrol dose pack is finished.      When will I see the patient back?  I will see the patient back in 2 months to follow-up their progress pending progress or sooner if needed.  If the patient is improved then they can call to cancel the appointment.  If the patient has improved range of motion, but continued pain, then we will look for other potential causes of shoulder pain at the follow-up appointment.  The zamudio now is to improve the range of motion and gradually decrease the pain they are experiencing.    Is an MRI needed at this time?   I counseled the patient that an MRI will not diagnose a frozen shoulder, but an MRI is indicated in the patient is refractory to our nonoperative treatment program for frozen shoulder.  An MRI can help to look for other potential causes of shoulder pain pending response to nonoperative treatment.      Follow Up:   No follow-ups on file.        Forest He MD, FAAOS  Orthopedic Surgeon  Fellowship Trained Shoulder and Elbow Surgeon  [facility]  Orthopedics and Sports Medicine  [address] [place] [address], [address]  [place] [address]    12/22/20  16:49 EST    Please note that portions of this  note may have been completed with a voice recognition program. Efforts were made to edit the dictations, but occasionally words are mistranscribed.

## 2020-12-22 NOTE — PROGRESS NOTES
Procedure   Large Joint Arthrocentesis: R glenohumeral  Date/Time: 12/22/2020 4:15 PM  Consent given by: patient  Site marked: site marked  Timeout: Immediately prior to procedure a time out was called to verify the correct patient, procedure, equipment, support staff and site/side marked as required   Supporting Documentation  Indications: pain   Procedure Details  Location: shoulder - R glenohumeral  Preparation: Patient was prepped and draped in the usual sterile fashion  Needle size: 22 G  Approach: posterior  Medications administered: 5 mL lidocaine PF 1% 1 %; 40 mg triamcinolone acetonide 40 MG/ML  Patient tolerance: patient tolerated the procedure well with no immediate complications

## 2021-01-14 ENCOUNTER — TREATMENT (OUTPATIENT)
Dept: PHYSICAL THERAPY | Facility: CLINIC | Age: 36
End: 2021-01-14

## 2021-01-14 DIAGNOSIS — G89.29 CHRONIC RIGHT SHOULDER PAIN: Primary | ICD-10-CM

## 2021-01-14 DIAGNOSIS — M25.511 CHRONIC RIGHT SHOULDER PAIN: Primary | ICD-10-CM

## 2021-01-14 PROCEDURE — 97161 PT EVAL LOW COMPLEX 20 MIN: CPT | Performed by: PHYSICAL THERAPIST

## 2021-01-14 NOTE — PROGRESS NOTES
Physical Therapy Initial Evaluation and Plan of Care      Patient: Yasmin Singleton   : 1985  Diagnosis/ICD-10 Code:  The encounter diagnosis was Chronic right shoulder pain.   Referring practitioner: Forest He MD  Date of Initial Visit: Type: THERAPY  Noted: 2021  Today's Date: 2021  Patient seen for 1 sessions         Yasmin Singleton reports:  R shoulder pain and limited mobility of 4-5 months duration  Subjective Questionnaire: QuickDASH: 54.55    Subjective Evaluation    History of Present Illness  Onset date: chronic for 4-5 months.  Mechanism of injury: No history of injury or trauma.     Subjective comment: Gradual onset of symptoms for the past 4-5 months  Patient Occupation: Commercial cleaning.  Enjoys fishing, swimming. Pain  Current pain ratin  At best pain rating: 3  At worst pain rating: 10  Location: R shoulder  Quality: dull ache, pulling and squeezing  Relieving factors: heat  Aggravating factors: overhead activity, lifting, sleeping, outstretched reach, prolonged positioning and repetitive movement (sudden movements)    Hand dominance: left    Diagnostic Tests  X-ray: normal    Treatments  Previous treatment: injection treatment  Patient Goals  Patient goals for therapy: decreased pain and increased motion           Treatment                Objective          Postural Observations  Seated posture: fair        Neurological Testing     Sensation     Shoulder   Left Shoulder   Intact: light touch    Right Shoulder   Intact: light touch    Active Range of Motion   Left Shoulder   Flexion: 178 degrees   Abduction: 178 degrees   External rotation 90°: 78 degrees   Internal rotation 90°: 89 degrees     Right Shoulder   Flexion: 134 degrees with pain  Abduction: 140 degrees   External rotation 90°: 53 degrees  Internal rotation 90°: 50 degrees     Strength/Myotome Testing     Left Shoulder   Normal muscle strength    Right Shoulder   Normal muscle strength          Assessment  & Plan     Assessment  Impairments: abnormal or restricted ROM, activity intolerance, lacks appropriate home exercise program and pain with function  Assessment details: Clinical presentation is consistent with adhesive capsulitis which is either in the thawing stage or has responded well to injection.  Pt notices that her motion is improving compared to what it had been prior to seeking treatment.  Prognosis: good  Functional Limitations: carrying objects, lifting, uncomfortable because of pain, reaching behind back and reaching overhead  Goals  Plan Goals: Pt. demonstrates independence and compliance with initial HEP.  Pt. reports reduction in pain intensity to no worse than 6/10 on NPRS.  AROM of R shoulder shows improvement over baseline measures.  Functional outcome measure (Quick DASH) is improved by 10 points.    Pt. demonstrates independence in advanced HEP for ongoing improvement.  AROM of R shoulder is sufficient for performance of daily activities without increase in pain.  R shoulder strength is sufficient to allow participation in desired activities.  Functional outcome measure is improved to </=30 points.          Plan  Therapy options: will be seen for skilled physical therapy services  Planned modality interventions: thermotherapy (hydrocollator packs) and cryotherapy  Planned therapy interventions: manual therapy, postural training, soft tissue mobilization, strengthening, stretching, therapeutic activities, joint mobilization, home exercise program, functional ROM exercises and flexibility  Frequency: 1x week  Duration in visits: 6  Treatment plan discussed with: patient  Plan details: PT weekly per POC, addressing R shoulder pain, ROM deficits, and functional limitations.        Timed:  Manual Therapy:         mins  26219;  Therapeutic Exercise:         mins  05928;     Neuromuscular Cintia:        mins  91651;    Therapeutic Activity:          mins  55888;     Gait Training:           mins  10942;      Ultrasound:          mins  48951;    Electrical Stimulation:         mins  08423 ( );    Untimed:  Electrical Stimulation:         mins  35228 ( );  Mechanical Traction:         mins  48268;     Timed Treatment:      mins   Total Treatment:     40   mins    PT SIGNATURE: Nieves Santo, PT   DATE TREATMENT INITIATED: 1/14/2021    Initial Certification  Certification Period: 4/14/2021  I certify that the therapy services are furnished while this patient is under my care.  The services outlined above are required by this patient, and will be reviewed every 90 days.     PHYSICIAN: Forest He MD      DATE:     Please sign and return via fax to [phone].. Thank you, [facility].

## 2021-01-21 ENCOUNTER — TREATMENT (OUTPATIENT)
Dept: PHYSICAL THERAPY | Facility: CLINIC | Age: 36
End: 2021-01-21

## 2021-01-21 DIAGNOSIS — M25.511 CHRONIC RIGHT SHOULDER PAIN: Primary | ICD-10-CM

## 2021-01-21 DIAGNOSIS — G89.29 CHRONIC RIGHT SHOULDER PAIN: Primary | ICD-10-CM

## 2021-01-21 PROCEDURE — 97110 THERAPEUTIC EXERCISES: CPT | Performed by: PHYSICAL THERAPIST

## 2021-01-21 NOTE — PROGRESS NOTES
"   Physical Therapy Daily Progress Note  VISIT: 2      Yasmin Singleton reports: no pain upon arrival today.  She primarily notices discomfort at end range or with sudden movements of R arm.    Subjective     Treatment  Pre-treatment pain:  0  Post-treatment pain:  0  Exercise 1  Exercise Name 1: UBE warm up  Equipment/Resistance 1: L3  Time: 5'  Exercise 2  Exercise Name 2: Rope pulley warm up and stretch  Time 2: 2'  Exercise 3  Exercise Name 3: IR behind back with towel-cues to stop at point of tolerable stretch  Exercise 4  Exercise Name 4: supine diagonal flexion-added resistive band  Exercise 5  Exercise Name 5: B ER with band  Exercise 6  Exercise Name 6: \"robbery\"-active    Manual Rx 1  Manual Rx 1 Location: R shoulder  Manual Rx 1 Type: Inferior and posterior humeral glide        Objective     Assessment & Plan     Assessment  Assessment details: Pt demonstrates improving R shoulder mobility and tolerated addition of resistive band well in clinic today.  Functional IR behind back is improving, but still uncomfortable.    Plan  Plan details: Continue PT.  Progress exercises as tolerated and address remaining ROM deficits               Timed:  Manual Therapy:    5     mins  71017;  Therapeutic Exercise:    23     mins  09455;     Neuromuscular Cintia:        mins  83284;    Therapeutic Activity:          mins  31488;     Gait Training:           mins  24371;     Ultrasound:          mins  37883;    Electrical Stimulation:         mins  05949 ( );    Untimed:  Electrical Stimulation:         mins  74122 ( );  Mechanical Traction:         mins  32023;     Timed Treatment:   28   mins   Total Treatment:     28   mins      Nieves Santo, PT  Physical Therapist                    "

## 2021-01-28 ENCOUNTER — TREATMENT (OUTPATIENT)
Dept: PHYSICAL THERAPY | Facility: CLINIC | Age: 36
End: 2021-01-28

## 2021-01-28 DIAGNOSIS — G89.29 CHRONIC RIGHT SHOULDER PAIN: Primary | ICD-10-CM

## 2021-01-28 DIAGNOSIS — M25.511 CHRONIC RIGHT SHOULDER PAIN: Primary | ICD-10-CM

## 2021-01-28 PROCEDURE — 97110 THERAPEUTIC EXERCISES: CPT | Performed by: PHYSICAL THERAPIST

## 2021-01-28 PROCEDURE — 97530 THERAPEUTIC ACTIVITIES: CPT | Performed by: PHYSICAL THERAPIST

## 2021-01-28 PROCEDURE — 97140 MANUAL THERAPY 1/> REGIONS: CPT | Performed by: PHYSICAL THERAPIST

## 2021-01-28 NOTE — PROGRESS NOTES
Physical Therapy Daily Progress Note    Patient: Yasmin Singleton   : 1985  Diagnosis/ICD-10 Code:  No primary diagnosis found.  Referring practitioner: Forest He MD  Date of Initial Visit: No linked episodes  Today's Date: 2021  Patient seen for Visit count could not be calculated. Make sure you are using a visit which is associated with an episode. sessions         Yasmin Singleton reports that her shoulder is more stiff and sore when reaching behind her back compared to last week.  Feels like her mobility is definitely better overall.  Still has difficulty sleeping in right and left side lying due to pressure and pulling in shoulder and upper arm region.    Subjective     Objective   See Exercise, Manual, and Modality Logs for complete treatment.       Assessment/Plan  Has good AROM in all planes.  Focused visit on desensitization of lateral upper arm soft tissue, and improving mobility in all planes.  Expect her pain to decrease over the next 4 weeks.           Manual Therapy:    10     mins  51927;  Therapeutic Exercise:    15     mins  85362;     Neuromuscular Cintia:        mins  51955;    Therapeutic Activity:     15     mins  45544;     Gait Training:           mins  07954;     Ultrasound:          mins  49217;    Electrical Stimulation:         mins  01820 ( );  Dry Needling          mins self-pay    Timed Treatment:   40   mins   Total Treatment:     40   mins    Real Bautista PT  Physical Therapist

## 2021-02-04 ENCOUNTER — TREATMENT (OUTPATIENT)
Dept: PHYSICAL THERAPY | Facility: CLINIC | Age: 36
End: 2021-02-04

## 2021-02-04 DIAGNOSIS — M25.511 CHRONIC RIGHT SHOULDER PAIN: Primary | ICD-10-CM

## 2021-02-04 DIAGNOSIS — G89.29 CHRONIC RIGHT SHOULDER PAIN: Primary | ICD-10-CM

## 2021-02-04 PROCEDURE — 97140 MANUAL THERAPY 1/> REGIONS: CPT | Performed by: PHYSICAL THERAPIST

## 2021-02-04 PROCEDURE — 97110 THERAPEUTIC EXERCISES: CPT | Performed by: PHYSICAL THERAPIST

## 2021-02-04 NOTE — PROGRESS NOTES
Physical Therapy Daily Progress Note  VISIT: 4          Subjective    Yasmin Singleton reports: mild R shoulder discomfort, but improving mobility.  Placing arm behind back remains the most difficult motion, but it is also improving.  She notices pressure in her upper arm as she begins warm up exercises.      Pre-treatment pain:  2  Post-treatment pain:  1      Objective    Treatment    Exercise 1  Exercise Name 1: UBE-alternating  Equipment/Resistance 1: L3  Time: 5'  Exercise 2  Exercise Name 2: rope pulleys for shoulder flex/abd  Exercise 3  Exercise Name 3: self-assisted behind back IR    Manual Rx 1  Manual Rx 1 Location: R shoulder  Manual Rx 1 Type: Astym and STM to R shoulder and upper arm, grade II-III GH inferior/posterior glide            Assessment & Plan     Assessment  Assessment details: R shoulder felt looser per pt report after session today.  AROM continues to improve.    Plan  Plan details: Continue PT, emphasizing restoring AROM to R shoulder.               Timed:  Manual Therapy:    10     mins  40212;  Therapeutic Exercise:    20     mins  64216;     Neuromuscular Cintia:        mins  46092;    Therapeutic Activity:          mins  35878;     Gait Training:           mins  78143;     Ultrasound:          mins  70178;    Electrical Stimulation:         mins  95089 ( );    Untimed:  Electrical Stimulation:         mins  28390 ( );  Mechanical Traction:         mins  74273;     Timed Treatment:   30   mins   Total Treatment:     30   mins      Nieves Santo, PT  Physical Therapist

## 2021-02-16 ENCOUNTER — OFFICE VISIT (OUTPATIENT)
Dept: INTERNAL MEDICINE | Facility: CLINIC | Age: 36
End: 2021-02-16

## 2021-02-16 DIAGNOSIS — E11.9 TYPE 2 DIABETES MELLITUS WITHOUT COMPLICATION, WITHOUT LONG-TERM CURRENT USE OF INSULIN (HCC): Primary | ICD-10-CM

## 2021-02-16 PROCEDURE — 99213 OFFICE O/P EST LOW 20 MIN: CPT | Performed by: NURSE PRACTITIONER

## 2021-02-16 RX ORDER — METFORMIN HYDROCHLORIDE 750 MG/1
750 TABLET, EXTENDED RELEASE ORAL
Qty: 30 TABLET | Refills: 5 | Status: SHIPPED | OUTPATIENT
Start: 2021-02-16 | End: 2021-09-24

## 2021-02-16 NOTE — PROGRESS NOTES
Subjective   Chief Complaint   Patient presents with   • Diabetes      This is Telephone visit.  You have chosen to receive care through a telephone visit today. Do you consent to use a telephone visit for your medical care today? Yes    Yasmin Singleton is a 35 y.o. female here today to follow up on diabetes.  She is Type II.  She states the diagnosis was made in 2007.  Her last A1c (12/2020) was <7.  She doesn't check her sugars regularly, but she denies having any episodes of hypoglycemia.  She reports that she does not take the Empaglifloxin-Metformin on a daily basis, usually every other day due to side effects of diarrhea.  She does use her Trulicity as prescribed tough.    I have reviewed the following portions of the patient's history and confirmed they are accurate: allergies, current medications, past family history, past medical history, past social history, past surgical history and problem list    I have personally completed the patient's review of systems.    Review of Systems   Constitutional: Negative for activity change, appetite change and fatigue.   HENT: Negative for congestion.    Respiratory: Negative for cough and shortness of breath.    Cardiovascular: Negative for chest pain and leg swelling.   Gastrointestinal: Positive for diarrhea. Negative for abdominal pain.   Endocrine: Negative for polydipsia and polyuria.   Neurological: Negative for dizziness, weakness and confusion.   Psychiatric/Behavioral: Negative for behavioral problems and decreased concentration.       Current Outpatient Medications on File Prior to Visit   Medication Sig   • atenolol (TENORMIN) 25 MG tablet Take 0.5 tablets by mouth Daily.   • Dulaglutide 0.75 MG/0.5ML solution pen-injector Inject 0.75 mg under the skin into the appropriate area as directed 1 (One) Time Per Week.   • famotidine (PEPCID) 20 MG tablet Take 1 tablet by mouth 2 (Two) Times a Day As Needed for Heartburn.   • glucose blood (FREESTYLE LITE) test strip  1 each by Other route 2 (Two) Times a Day. Use as instructed   • Insulin Pen Needle (PEN NEEDLES) 31G X 5 MM misc 1.8 mL Daily.   • Lancets (FREESTYLE) lancets 1 each by Other route Daily. Use as instructed   • levothyroxine (SYNTHROID, LEVOTHROID) 75 MCG tablet Take 1 tablet by mouth Daily.   • prenatal vitamin (prenatal, CLASSIC, vitamin) tablet Take 1 tablet by mouth Daily.   • traZODone (DESYREL) 50 MG tablet Take 1 tablet by mouth At Night As Needed for Sleep.   • vitamin D (ERGOCALCIFEROL) 1.25 MG (94851 UT) capsule capsule Take 1 capsule by mouth 1 (One) Time Per Week.   • [DISCONTINUED] Empagliflozin-metFORMIN HCl 5-1000 MG tablet Take 1 tablet by mouth 2 (Two) Times a Day. (Patient taking differently: Take 1 tablet by mouth. Every other day)   • Blood Pressure Monitoring (B-D ASSURE BPM/DELUXE ARM CUFF) misc 1 each Daily.   • [DISCONTINUED] chlorhexidine (HIBICLENS) 4 % external liquid Apply  topically to the appropriate area as directed Every Other Day.     No current facility-administered medications on file prior to visit.        Objective   There were no vitals filed for this visit.  There is no height or weight on file to calculate BMI.    Physical Exam  Pulmonary:      Comments: Speaking full sentences without difficulty  Neurological:      Mental Status: She is alert and oriented to person, place, and time.   Psychiatric:         Mood and Affect: Mood normal.         Assessment/Plan   Problem List Items Addressed This Visit        Endocrine and Metabolic    Type 2 diabetes mellitus without complication, without long-term current use of insulin (CMS/[facility]) - Primary    Relevant Medications    metFORMIN ER (GLUCOPHAGE-XR) 750 MG 24 hr tablet      Change to Glucophage to see if will help with diarrhea  A1c in December <7 - well controlled  Monitor sugars regularly  Evaluated via telephone encounter with prior verbal consent      Current Outpatient Medications:   •  atenolol (TENORMIN) 25 MG tablet, Take  0.5 tablets by mouth Daily., Disp: 30 tablet, Rfl: 3  •  Dulaglutide 0.75 MG/0.5ML solution pen-injector, Inject 0.75 mg under the skin into the appropriate area as directed 1 (One) Time Per Week., Disp: 1 mL, Rfl: 5  •  famotidine (PEPCID) 20 MG tablet, Take 1 tablet by mouth 2 (Two) Times a Day As Needed for Heartburn., Disp: 180 tablet, Rfl: 2  •  glucose blood (FREESTYLE LITE) test strip, 1 each by Other route 2 (Two) Times a Day. Use as instructed, Disp: 100 each, Rfl: 12  •  Insulin Pen Needle (PEN NEEDLES) 31G X 5 MM misc, 1.8 mL Daily., Disp: 100 each, Rfl: 2  •  Lancets (FREESTYLE) lancets, 1 each by Other route Daily. Use as instructed, Disp: 100 each, Rfl: 10  •  levothyroxine (SYNTHROID, LEVOTHROID) 75 MCG tablet, Take 1 tablet by mouth Daily., Disp: 90 tablet, Rfl: 1  •  prenatal vitamin (prenatal, CLASSIC, vitamin) tablet, Take 1 tablet by mouth Daily., Disp: 30 tablet, Rfl: 11  •  traZODone (DESYREL) 50 MG tablet, Take 1 tablet by mouth At Night As Needed for Sleep., Disp: 30 tablet, Rfl: 2  •  vitamin D (ERGOCALCIFEROL) 1.25 MG (79072 UT) capsule capsule, Take 1 capsule by mouth 1 (One) Time Per Week., Disp: 12 capsule, Rfl: 0  •  Blood Pressure Monitoring (B-D ASSURE BPM/DELUXE ARM CUFF) misc, 1 each Daily., Disp: 1 each, Rfl: 0  •  metFORMIN ER (GLUCOPHAGE-XR) 750 MG 24 hr tablet, Take 1 tablet by mouth Daily With Breakfast for 30 days., Disp: 30 tablet, Rfl: 5       Plan of care reviewed with the patient at the conclusion of today's visit.  Education was provided regarding diagnosis, management, and any prescribed or recommended OTC medications.  Patient verbalized understanding of and agreement with management plan.     Return in about 3 months (around 5/16/2021) for Diabetes.     I spent 22 minutes in medical discussion with patient during this visit.     Caleb Parks, JAIMIE    Please note that portions of this note were completed with a voice recognition program. Efforts were made to edit  the dictations, but occasionally words are mistranscribed.

## 2021-02-18 ENCOUNTER — TELEPHONE (OUTPATIENT)
Dept: ORTHOPEDICS | Facility: OTHER | Age: 36
End: 2021-02-18

## 2021-02-23 ENCOUNTER — OFFICE VISIT (OUTPATIENT)
Dept: ORTHOPEDIC SURGERY | Facility: CLINIC | Age: 36
End: 2021-02-23

## 2021-02-23 VITALS — WEIGHT: 174 LBS | BODY MASS INDEX: 30.83 KG/M2 | HEIGHT: 63 IN | OXYGEN SATURATION: 100 % | HEART RATE: 103 BPM

## 2021-02-23 DIAGNOSIS — M75.51 BURSITIS OF RIGHT SHOULDER: ICD-10-CM

## 2021-02-23 DIAGNOSIS — M75.01 ADHESIVE CAPSULITIS OF RIGHT SHOULDER: Primary | ICD-10-CM

## 2021-02-23 DIAGNOSIS — M75.41 IMPINGEMENT SYNDROME OF RIGHT SHOULDER: ICD-10-CM

## 2021-02-23 DIAGNOSIS — M75.00 DIABETIC FROZEN SHOULDER ASSOCIATED WITH TYPE 2 DIABETES MELLITUS (HCC): ICD-10-CM

## 2021-02-23 DIAGNOSIS — E11.618 DIABETIC FROZEN SHOULDER ASSOCIATED WITH TYPE 2 DIABETES MELLITUS (HCC): ICD-10-CM

## 2021-02-23 PROCEDURE — 99212 OFFICE O/P EST SF 10 MIN: CPT | Performed by: ORTHOPAEDIC SURGERY

## 2021-02-23 NOTE — PROGRESS NOTES
[facility] Office Follow Up       Office Follow Up Visit       Patient Name: Yasmin Singleton    Chief Complaint:   Chief Complaint   Patient presents with   • Follow-up     2 month recheck right shoulder pain       Referring Physician: No ref. provider found    History of Present Illness:   It has been 2  month(s) since Yasmin Singleton's last visit. Yasmin Singleton returns to clinic today for F/U: follow-up of rightBody Part: shoulderReason: pain. The issue has been ongoing for 6 month(s). Yasmin Singleton rates HIS/HER: herpain at 2/10 on the pain scale. Previous/current treatments: physical therapy. Current symptoms:Symptoms: pain. The pain is worse with sleeping; heat improves the pain. Overall, he/she: sheis doing better. I have reviewed the patient's history of present illness as noted/entered above.    I have reviewed the patient's past medical history, surgical history, social history, family history, medications, and allergies as noted in the electronic medical record and as noted/entered.  I have reviewed the patient's review of systems as noted/enter and updated as noted in the patient's HPI.      RIGHT frozen shoulder  Responded to PT and injection      PRIOR:  Right shoulder pain  Diabetic frozen shoulder, first occurrence     Pain dating back to October 26, 2020 and beyond over the last 4+ months no acute bony findings on her images     Originally from [place]     History of carpal tunnel syndrome and tendinitis     Has lived in [facility] for the last 7 to 8 years     Cleaning commercial for [employer]     Right shoulder pain and stiffness        Subjective   Subjective      Review of Systems   Constitutional: Negative for chills, fatigue and fever.   HENT: Negative.  Negative for congestion and dental problem.    Eyes: Negative.  Negative for blurred vision.   Respiratory: Negative.  Negative for shortness of breath.     Cardiovascular: Negative.  Negative for leg swelling.   Gastrointestinal: Negative.  Negative for abdominal pain.   Endocrine: Negative.  Negative for polyuria.   Genitourinary: Negative.  Negative for difficulty urinating.   Musculoskeletal: Positive for arthralgias and back pain.   Skin: Negative.    Allergic/Immunologic: Negative.    Neurological: Positive for weakness.   Hematological: Negative.  Negative for adenopathy.   Psychiatric/Behavioral: Negative.  Negative for behavioral problems.        Past Medical History:   Past Medical History:   Diagnosis Date   • Diabetes (CMS/HCC)    • GERD (gastroesophageal reflux disease)    • History of recurrent UTIs    • Seasonal allergies        Past Surgical History:   Past Surgical History:   Procedure Laterality Date   • WISDOM TOOTH EXTRACTION  2015       Family History:   Family History   Problem Relation Age of Onset   • Cancer Father         Unsure what kind   • Hypertension Father    • Stroke Father    • Kidney failure Mother        Social History:   Social History     Socioeconomic History   • Marital status: Single     Spouse name: Not on file   • Number of children: Not on file   • Years of education: Not on file   • Highest education level: Not on file   Tobacco Use   • Smoking status: Current Every Day Smoker     Packs/day: 0.50     Years: 12.00     Pack years: 6.00     Types: Cigarettes   • Smokeless tobacco: Never Used   Substance and Sexual Activity   • Alcohol use: Yes     Comment: on occasion, at most 1-2 drinks a month   • Drug use: Yes     Frequency: 4.0 times per week     Types: Marijuana   • Sexual activity: Yes     Partners: Male     Birth control/protection: None       Medications:   Current Outpatient Medications:   •  atenolol (TENORMIN) 25 MG tablet, Take 0.5 tablets by mouth Daily., Disp: 30 tablet, Rfl: 3  •  Blood Pressure Monitoring (B-D ASSURE BPM/DELUXE ARM CUFF) misc, 1 each Daily., Disp: 1 each, Rfl: 0  •  Dulaglutide 0.75 MG/0.5ML  "solution pen-injector, Inject 0.75 mg under the skin into the appropriate area as directed 1 (One) Time Per Week., Disp: 1 mL, Rfl: 5  •  famotidine (PEPCID) 20 MG tablet, Take 1 tablet by mouth 2 (Two) Times a Day As Needed for Heartburn., Disp: 180 tablet, Rfl: 2  •  glucose blood (FREESTYLE LITE) test strip, 1 each by Other route 2 (Two) Times a Day. Use as instructed, Disp: 100 each, Rfl: 12  •  Insulin Pen Needle (PEN NEEDLES) 31G X 5 MM misc, 1.8 mL Daily., Disp: 100 each, Rfl: 2  •  Lancets (FREESTYLE) lancets, 1 each by Other route Daily. Use as instructed, Disp: 100 each, Rfl: 10  •  levothyroxine (SYNTHROID, LEVOTHROID) 75 MCG tablet, Take 1 tablet by mouth Daily., Disp: 90 tablet, Rfl: 1  •  metFORMIN ER (GLUCOPHAGE-XR) 750 MG 24 hr tablet, Take 1 tablet by mouth Daily With Breakfast for 30 days., Disp: 30 tablet, Rfl: 5  •  prenatal vitamin (prenatal, CLASSIC, vitamin) tablet, Take 1 tablet by mouth Daily., Disp: 30 tablet, Rfl: 11  •  traZODone (DESYREL) 50 MG tablet, Take 1 tablet by mouth At Night As Needed for Sleep., Disp: 30 tablet, Rfl: 2  •  vitamin D (ERGOCALCIFEROL) 1.25 MG (42027 UT) capsule capsule, Take 1 capsule by mouth 1 (One) Time Per Week., Disp: 12 capsule, Rfl: 0    Allergies: No Known Allergies    The following portions of the patient's history were reviewed and updated as appropriate: allergies, current medications, past family history, past medical history, past social history, past surgical history and problem list.        Objective    Objective      Vital Signs:   Vitals:    02/23/21 1517   Pulse: 103   SpO2: 100%   Weight: 78.9 kg (174 lb)   Height: 160 cm (63\")       Ortho Exam:  Right frozen shoulder but excellent arc of motion at this point still some diminished internal rotation but overall improved, mild impingement syndrome, SLT intact, well-perfused, negative rotator cuff testing, negative biceps    Results Review:  Imaging Results (Last 24 Hours)     ** No results found " for the last 24 hours. 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 (CMS/[facility])    Relevant Medications    Dulaglutide 0.75 MG/0.5ML solution pen-injector    metFORMIN ER (GLUCOPHAGE-XR) 750 MG 24 hr tablet    Other Relevant Orders    Ambulatory Referral to Physical Therapy Ortho, Evaluate and treat    Impingement syndrome of right shoulder    Relevant Orders    Ambulatory Referral to Physical Therapy Ortho, Evaluate and treat    Bursitis of right shoulder    Relevant Orders    Ambulatory Referral to Physical Therapy Ortho, Evaluate and treat    Adhesive capsulitis of right shoulder - Primary    Relevant Orders    Ambulatory Referral to Physical Therapy Ortho, Evaluate and treat          Right frozen shoulder diabetic frozen shoulder    Improvements noted, PT prescription updated, continue with home program continue with stretching, will call if additional injection needed in the future, will call if any worsening arises may need to get an MRI in the future if not better    Follow Up:   As needed  Forest He MD, FAAOS  Orthopedic Surgeon  Fellowship Trained Shoulder and Elbow Surgeon  Psychiatric  Orthopedics and Sports Medicine  [address] [place] [address], [address]  [place] [address]    02/23/21  16:03 EST    Please note that portions of this note may have been completed with a voice recognition program. Efforts were made to edit the dictations, but occasionally words are mistranscribed.

## 2021-03-04 ENCOUNTER — TELEPHONE (OUTPATIENT)
Dept: INTERNAL MEDICINE | Facility: CLINIC | Age: 36
End: 2021-03-04

## 2021-03-04 NOTE — TELEPHONE ENCOUNTER
PATIENT CALLED STATING THAT SHE RECEIVED A LETTER FROM HER INSURANCE COMPANY STATING THAT THEY WILL NO LONGER BE COVERING TRULICITY INJECTION PENS    PATIENT NEEDS TO KNOW IF A PRIOR AUTHORIZATION WILL COVER IT OR IF SHE NEEDS TO BE CHANGED TO A DIFFERENT MEDICATION.    LAST TIME IT WAS FILLED WAS 02/08/21 SO PATIENT WILL BE OUT 03/07/21 AND WILL NEED SOMETHING TO REPLACE IT ASAP.    PLEASE ADVISE AND CALL PATIENT [phone]    [facility] [address] AT [facility] [address] - [phone]  - [phone] FX

## 2021-03-05 NOTE — TELEPHONE ENCOUNTER
Prior Authorization for Trulicity 0.75mg/0.5mL pen injectors completed and sent to plan via Covermymeds. Status pending;   KEY:[id number]   PA Case ID: [id number]

## 2021-03-09 NOTE — TELEPHONE ENCOUNTER
Received outcome that PA was approved by plan. Approval letter faxed to office, approved from 03/08/21-03/08/22. Called and informed pharmacy of above approval info. Attempted to contact pt; no answer LVM with office number given.

## 2021-03-11 ENCOUNTER — TREATMENT (OUTPATIENT)
Dept: PHYSICAL THERAPY | Facility: CLINIC | Age: 36
End: 2021-03-11

## 2021-03-11 DIAGNOSIS — M25.511 CHRONIC RIGHT SHOULDER PAIN: Primary | ICD-10-CM

## 2021-03-11 DIAGNOSIS — G89.29 CHRONIC RIGHT SHOULDER PAIN: Primary | ICD-10-CM

## 2021-03-11 PROCEDURE — 97110 THERAPEUTIC EXERCISES: CPT | Performed by: PHYSICAL THERAPIST

## 2021-03-11 NOTE — PROGRESS NOTES
Physical Therapy Daily Progress Note/Discharge Note    Patient: Yasmin Singleton   : 1985  Diagnosis/ICD-10 Code:  The encounter diagnosis was Chronic right shoulder pain.   Referring practitioner: Forest He MD  Date of Initial Visit: Type: THERAPY  Noted: 2021  Today's Date: 3/11/2021  Visit:  5     Yasmin Singleton reports: minimal pain, improving R shoulder motion. Behind back mobility is the only movement she still has difficulty with.    Subjective     Treatment  Pre-treatment pain:  1  Post-treatment pain:  1  Exercise 1  Exercise Name 1: DC assessment  Exercise 2  Exercise Name 2: HEP review      Goals  Plan Goals: Pt. demonstrates independence and compliance with initial HEP-met.  Pt. reports reduction in pain intensity to no worse than 6/10 on NPRS-met.  AROM of R shoulder shows improvement over baseline measures-met.  Functional outcome measure (Quick DASH) is improved by 10 points-met.    Pt. demonstrates independence in advanced HEP for ongoing improvement-met.  AROM of R shoulder is sufficient for performance of daily activities without increase in pain-met.  R shoulder strength is sufficient to allow participation in desired activities-met.  Functional outcome measure is improved to </=30 points.    -met        Objective          Active Range of Motion     Right Shoulder   Flexion: 163 degrees   Abduction: 155 degrees   External rotation 90°: 82 degrees  Internal rotation 90°: 71 (functional to T-10) degrees         Assessment & Plan     Assessment  Assessment details: Goals met.  Functional abilities significantly improved.  Pt is independent in HEP.    Plan  Plan details: DC to independent HEP.               Timed:  Manual Therapy:         mins  65059;  Therapeutic Exercise:    24     mins  17475;     Neuromuscular Cintia:        mins  27798;    Therapeutic Activity:          mins  03159;     Gait Training:           mins  46148;     Ultrasound:          mins  41737;    Electrical  Stimulation:         mins  85757 ( );    Untimed:  Electrical Stimulation:         mins  11598 ( );  Mechanical Traction:         mins  42833;     Timed Treatment:   24   mins   Total Treatment:     24   mins      Nieves Santo PT  Physical Therapist

## 2021-06-02 ENCOUNTER — OFFICE VISIT (OUTPATIENT)
Dept: INTERNAL MEDICINE | Facility: CLINIC | Age: 36
End: 2021-06-02

## 2021-06-02 VITALS
BODY MASS INDEX: 31.28 KG/M2 | HEART RATE: 106 BPM | SYSTOLIC BLOOD PRESSURE: 110 MMHG | HEIGHT: 62 IN | TEMPERATURE: 98 F | DIASTOLIC BLOOD PRESSURE: 60 MMHG | OXYGEN SATURATION: 98 % | RESPIRATION RATE: 18 BRPM | WEIGHT: 170 LBS

## 2021-06-02 DIAGNOSIS — R82.90 FOUL SMELLING URINE: ICD-10-CM

## 2021-06-02 DIAGNOSIS — Z11.59 ENCOUNTER FOR HEPATITIS C SCREENING TEST FOR LOW RISK PATIENT: Primary | ICD-10-CM

## 2021-06-02 DIAGNOSIS — L84 CALLUS OF FOOT: ICD-10-CM

## 2021-06-02 DIAGNOSIS — E11.9 TYPE 2 DIABETES MELLITUS WITHOUT COMPLICATION, WITHOUT LONG-TERM CURRENT USE OF INSULIN (HCC): ICD-10-CM

## 2021-06-02 DIAGNOSIS — I51.7 MILD CONCENTRIC LEFT VENTRICULAR HYPERTROPHY (LVH): ICD-10-CM

## 2021-06-02 DIAGNOSIS — E55.9 VITAMIN D DEFICIENCY: ICD-10-CM

## 2021-06-02 LAB
BILIRUB BLD-MCNC: ABNORMAL MG/DL
CLARITY, POC: ABNORMAL
COLOR UR: ABNORMAL
GLUCOSE UR STRIP-MCNC: NEGATIVE MG/DL
KETONES UR QL: ABNORMAL
LEUKOCYTE EST, POC: ABNORMAL
NITRITE UR-MCNC: NEGATIVE MG/ML
PH UR: 5 [PH] (ref 5–8)
PROT UR STRIP-MCNC: ABNORMAL MG/DL
RBC # UR STRIP: NEGATIVE /UL
SP GR UR: 1.01 (ref 1–1.03)
UROBILINOGEN UR QL: ABNORMAL

## 2021-06-02 PROCEDURE — 81003 URINALYSIS AUTO W/O SCOPE: CPT | Performed by: NURSE PRACTITIONER

## 2021-06-02 PROCEDURE — 99214 OFFICE O/P EST MOD 30 MIN: CPT | Performed by: NURSE PRACTITIONER

## 2021-06-02 NOTE — PROGRESS NOTES
"Chief Complaint   Patient presents with   • Diabetes     f/u   • Foot Pain     calluses  couple of months    • Callouses       HPI  Yasmin Singleton is a 35 y.o. female presents for follow-up on Type II diabetes.  She was diagnosed in 2007.  She has never been on insulin.  Her treatment includes Ozempic and Glucophage XR.  She has lost from 226 lbs to 170 lbs by diet.  Pt has a large callus on the bottom of her left foot.  Is getting painful. The area does not bust open or drain.  Lost her Atenolol.  Hasn't had in about a week and insurance won't cover for another week.        The following portions of the patient's history were reviewed and updated as appropriate: allergies, current medications, past family history, past medical history, past social history, past surgical history and problem list.    Subjective  Review of Systems   Constitutional: Negative for activity change, appetite change and fatigue.   HENT: Negative for congestion.    Respiratory: Negative for cough and shortness of breath.    Cardiovascular: Negative for chest pain and leg swelling.   Gastrointestinal: Negative for abdominal pain.   Neurological: Negative for dizziness, weakness and confusion.   Psychiatric/Behavioral: Negative for behavioral problems and decreased concentration.       Objective  Visit Vitals  /60   Pulse 106   Temp 98 °F (36.7 °C) (Temporal)   Resp 18   Ht 157.5 cm (62\")   Wt 77.1 kg (170 lb)   SpO2 98%   BMI 31.09 kg/m²        Physical Exam  Vitals and nursing note reviewed.   HENT:      Head: Normocephalic.   Eyes:      Pupils: Pupils are equal, round, and reactive to light.   Cardiovascular:      Rate and Rhythm: Normal rate and regular rhythm.      Pulses: Normal pulses.   Pulmonary:      Effort: Pulmonary effort is normal.      Breath sounds: Normal breath sounds.   Musculoskeletal:      Cervical back: Normal range of motion.        Feet:    Feet:      Right foot:      Protective Sensation: 6 sites tested. 5 sites " sensed.      Left foot:      Protective Sensation: 6 sites tested. 5 sites sensed.   Skin:     General: Skin is warm and dry.      Capillary Refill: Capillary refill takes less than 2 seconds.   Neurological:      General: No focal deficit present.      Mental Status: She is alert and oriented to person, place, and time.   Psychiatric:         Mood and Affect: Mood normal.         Behavior: Behavior normal.         Thought Content: Thought content normal.        Results for orders placed or performed in visit on 06/02/21   POC Urinalysis Dipstick, Automated    Specimen: Urine   Result Value Ref Range    Color Dark Yellow Yellow, Straw, Dark Yellow, Donna    Clarity, UA Cloudy (A) Clear    Specific Gravity  1.015 1.005 - 1.030    pH, Urine 5.0 5.0 - 8.0    Leukocytes 500 Elie/ul (A) Negative    Nitrite, UA Negative Negative    Protein, POC Trace (A) Negative mg/dL    Glucose, UA Negative Negative, 1000 mg/dL (3+) mg/dL    Ketones, UA 15 mg/dL (A) Negative    Urobilinogen, UA 1 E.U./dL  (A) Normal    Bilirubin 1 mg/dL (A) Negative    Blood, UA Negative Negative       Procedures     Assessment and Plan  Diagnoses and all orders for this visit:    1. Encounter for hepatitis C screening test for low risk patient (Primary)  -     Cancel: Hepatitis C Antibody  -     Hepatitis C Antibody    2. Type 2 diabetes mellitus without complication, without long-term current use of insulin (CMS/[facility])  -     Cancel: CBC Auto Differential; Future  -     Comprehensive Metabolic Panel  -     Hemoglobin A1c  -     MicroAlbumin, Urine, Random -  -     CBC & Differential    3. Foul smelling urine  -     POC Urinalysis Dipstick, Automated  -     Urine Culture - Urine, Urine, Clean Catch; Future    4. Vitamin D deficiency  -     Cancel: Vitamin D 25 hydroxy; Future  -     Vitamin D 25 Hydroxy    5. Callus of foot  -     Ambulatory Referral to Podiatry    6. Mild concentric left ventricular hypertrophy (LVH)  -     Ambulatory Referral to  Cardiology        Return in about 3 months (around 9/2/2021) for Diabetes.    Caleb Parks, APRN

## 2021-06-03 DIAGNOSIS — E55.9 VITAMIN D DEFICIENCY: Primary | ICD-10-CM

## 2021-06-03 LAB
25(OH)D3+25(OH)D2 SERPL-MCNC: 23.3 NG/ML (ref 30–100)
ALBUMIN SERPL-MCNC: 4.5 G/DL (ref 3.8–4.8)
ALBUMIN/GLOB SERPL: 1.9 {RATIO} (ref 1.2–2.2)
ALP SERPL-CCNC: 63 IU/L (ref 48–121)
ALT SERPL-CCNC: 22 IU/L (ref 0–32)
AST SERPL-CCNC: 16 IU/L (ref 0–40)
BASOPHILS # BLD AUTO: 0.1 X10E3/UL (ref 0–0.2)
BASOPHILS NFR BLD AUTO: 1 %
BILIRUB SERPL-MCNC: <0.2 MG/DL (ref 0–1.2)
BUN SERPL-MCNC: 7 MG/DL (ref 6–20)
BUN/CREAT SERPL: 11 (ref 9–23)
CALCIUM SERPL-MCNC: 9.3 MG/DL (ref 8.7–10.2)
CHLORIDE SERPL-SCNC: 103 MMOL/L (ref 96–106)
CO2 SERPL-SCNC: 24 MMOL/L (ref 20–29)
CREAT SERPL-MCNC: 0.63 MG/DL (ref 0.57–1)
EOSINOPHIL # BLD AUTO: 0.2 X10E3/UL (ref 0–0.4)
EOSINOPHIL NFR BLD AUTO: 3 %
ERYTHROCYTE [DISTWIDTH] IN BLOOD BY AUTOMATED COUNT: 14 % (ref 11.7–15.4)
GLOBULIN SER CALC-MCNC: 2.4 G/DL (ref 1.5–4.5)
GLUCOSE SERPL-MCNC: 96 MG/DL (ref 65–99)
HBA1C MFR BLD: 5.8 % (ref 4.8–5.6)
HCT VFR BLD AUTO: 45.2 % (ref 34–46.6)
HCV AB S/CO SERPL IA: <0.1 S/CO RATIO (ref 0–0.9)
HGB BLD-MCNC: 15.1 G/DL (ref 11.1–15.9)
IMM GRANULOCYTES # BLD AUTO: 0 X10E3/UL (ref 0–0.1)
IMM GRANULOCYTES NFR BLD AUTO: 0 %
LYMPHOCYTES # BLD AUTO: 2.7 X10E3/UL (ref 0.7–3.1)
LYMPHOCYTES NFR BLD AUTO: 44 %
MCH RBC QN AUTO: 29.6 PG (ref 26.6–33)
MCHC RBC AUTO-ENTMCNC: 33.4 G/DL (ref 31.5–35.7)
MCV RBC AUTO: 89 FL (ref 79–97)
MICROALBUMIN UR-MCNC: 14.6 UG/ML
MONOCYTES # BLD AUTO: 0.4 X10E3/UL (ref 0.1–0.9)
MONOCYTES NFR BLD AUTO: 7 %
MORPHOLOGY BLD-IMP: NORMAL
NEUTROPHILS # BLD AUTO: 2.7 X10E3/UL (ref 1.4–7)
NEUTROPHILS NFR BLD AUTO: 45 %
PLATELET # BLD AUTO: 254 X10E3/UL (ref 150–450)
POTASSIUM SERPL-SCNC: 4.1 MMOL/L (ref 3.5–5.2)
PROT SERPL-MCNC: 6.9 G/DL (ref 6–8.5)
RBC # BLD AUTO: 5.1 X10E6/UL (ref 3.77–5.28)
SODIUM SERPL-SCNC: 141 MMOL/L (ref 134–144)
WBC # BLD AUTO: 6.1 X10E3/UL (ref 3.4–10.8)

## 2021-06-03 RX ORDER — ERGOCALCIFEROL 1.25 MG/1
50000 CAPSULE ORAL WEEKLY
Qty: 4 CAPSULE | Refills: 11 | Status: SHIPPED | OUTPATIENT
Start: 2021-06-03 | End: 2021-06-04

## 2021-06-04 ENCOUNTER — OFFICE VISIT (OUTPATIENT)
Dept: CARDIOLOGY | Facility: CLINIC | Age: 36
End: 2021-06-04

## 2021-06-04 ENCOUNTER — TELEPHONE (OUTPATIENT)
Dept: INTERNAL MEDICINE | Facility: CLINIC | Age: 36
End: 2021-06-04

## 2021-06-04 VITALS
HEART RATE: 90 BPM | BODY MASS INDEX: 31.28 KG/M2 | WEIGHT: 170 LBS | SYSTOLIC BLOOD PRESSURE: 110 MMHG | DIASTOLIC BLOOD PRESSURE: 64 MMHG | OXYGEN SATURATION: 96 % | HEIGHT: 62 IN | TEMPERATURE: 96.6 F

## 2021-06-04 DIAGNOSIS — I51.7 LVH (LEFT VENTRICULAR HYPERTROPHY): Primary | ICD-10-CM

## 2021-06-04 DIAGNOSIS — I10 ESSENTIAL HYPERTENSION: ICD-10-CM

## 2021-06-04 LAB
BACTERIA UR CULT: NORMAL
BACTERIA UR CULT: NORMAL

## 2021-06-04 PROCEDURE — 93000 ELECTROCARDIOGRAM COMPLETE: CPT | Performed by: INTERNAL MEDICINE

## 2021-06-04 PROCEDURE — 99243 OFF/OP CNSLTJ NEW/EST LOW 30: CPT | Performed by: INTERNAL MEDICINE

## 2021-06-04 NOTE — PROGRESS NOTES
Patient still having a ammonia smell in her urine, and what she should do next, to fix the problem

## 2021-06-04 NOTE — PROGRESS NOTES
Subjective:     Encounter Date:06/04/2021    Primary Care Physician: Caleb Parks APRN      Patient ID: Yasmin Singleton is a 35 y.o. female.    Chief Complaint:LVH (CONSULT) and Hypertension    PROBLEM LIST:  1. LVH  a. Normal routine GXT 2/2019  b. 2/14/2019 echo EF 60%.  Mild LVH.  Normal valves.  2. Hypertension, history  3. Diabetes mellitus type II  a. Diagnosed at age 22.  4. Tobacco abuse  5. Hypothyroidism  6. GERD  7. Surgeries:  a. [place] teeth extraction      No Known Allergies      Current Outpatient Medications:   •  Dulaglutide 0.75 MG/0.5ML solution pen-injector, Inject 0.75 mg under the skin into the appropriate area as directed 1 (One) Time Per Week., Disp: 1 mL, Rfl: 5  •  famotidine (PEPCID) 20 MG tablet, Take 1 tablet by mouth 2 (Two) Times a Day As Needed for Heartburn., Disp: 180 tablet, Rfl: 2  •  glucose blood (FREESTYLE LITE) test strip, 1 each by Other route 2 (Two) Times a Day. Use as instructed, Disp: 100 each, Rfl: 12  •  Insulin Pen Needle (PEN NEEDLES) 31G X 5 MM misc, 1.8 mL Daily., Disp: 100 each, Rfl: 2  •  Lancets (FREESTYLE) lancets, 1 each by Other route Daily. Use as instructed, Disp: 100 each, Rfl: 10  •  levothyroxine (SYNTHROID, LEVOTHROID) 75 MCG tablet, Take 1 tablet by mouth Daily., Disp: 90 tablet, Rfl: 1  •  metFORMIN ER (GLUCOPHAGE-XR) 750 MG 24 hr tablet, Take 1 tablet by mouth Daily With Breakfast for 30 days., Disp: 30 tablet, Rfl: 5  •  prenatal vitamin (prenatal, CLASSIC, vitamin) tablet, Take 1 tablet by mouth Daily., Disp: 30 tablet, Rfl: 11  •  traZODone (DESYREL) 50 MG tablet, Take 1 tablet by mouth At Night As Needed for Sleep. (Patient taking differently: Take 50 mg by mouth As Needed for Sleep.), Disp: 30 tablet, Rfl: 2        History of Present Illness    Patient is a 35-year-old -American female who we are seeing today for further evaluation of LVH.  She has no previous history of coronary disease.  She has retractors including history of  hypertension, and diabetes.  Patient notes that she has intentionally lost roughly 50 to 60 pounds in the last year.  She had previous documentation of LVH by EKG and echocardiogram performed 2 years ago.  Since that time she has undergone weight loss and improved her blood pressure and her blood pressure medications have been discontinued.  She does continue to smoke.  She has occasional sharp stabbing short lasting chest pain that comes here and there.  No specific triggering factors noted.  Typically only lasts a split second.  Denies any increase shortness of breath.  Notes that she is very active with her job.  Denies any syncope, near-syncope, or edema.  Given her abnormal findings in the past she was referred here for further evaluation.    The following portions of the patient's history were reviewed and updated as appropriate: allergies, current medications, past family history, past medical history, past social history, past surgical history and problem list.    Family History   Problem Relation Age of Onset   • Cancer Father         Unsure what kind   • Hypertension Father    • Stroke Father    • Kidney failure Mother        Social History     Tobacco Use   • Smoking status: Current Every Day Smoker     Packs/day: 0.50     Years: 12.00     Pack years: 6.00     Types: Cigarettes   • Smokeless tobacco: Never Used   Substance Use Topics   • Alcohol use: Yes     Comment: on occasion, at most 1-2 drinks a month   • Drug use: Yes     Frequency: 4.0 times per week     Types: Marijuana         Review of Systems   Constitutional: Positive for malaise/fatigue. Negative for fever.   HENT: Negative for nosebleeds.    Eyes: Negative for redness and visual disturbance.   Cardiovascular: Positive for chest pain (sharp short lasting pain). Negative for orthopnea, palpitations and paroxysmal nocturnal dyspnea.   Respiratory: Negative for cough, snoring, sputum production and wheezing.    Hematologic/Lymphatic: Negative  "for bleeding problem.   Skin: Negative for flushing, itching and rash.   Musculoskeletal: Negative for falls, joint pain and muscle cramps.   Gastrointestinal: Negative for abdominal pain, diarrhea, heartburn, nausea and vomiting.   Genitourinary: Negative for hematuria.   Neurological: Negative for excessive daytime sleepiness, dizziness, headaches, tremors and weakness.   Psychiatric/Behavioral: Negative for substance abuse. The patient is not nervous/anxious.           Objective:   /64   Pulse 90   Temp 96.6 °F (35.9 °C)   Ht 157.5 cm (62.01\")   Wt 77.1 kg (170 lb)   SpO2 96%   BMI 31.09 kg/m²   Repeat check by Dr. Wesley 106/64      Vitals reviewed.   Constitutional:       Appearance: Healthy appearance. Well-developed and not in distress.   Neck:      Vascular: No JVD.      Trachea: No tracheal deviation.   Pulmonary:      Effort: Pulmonary effort is normal.      Breath sounds: Normal breath sounds.   Cardiovascular:      Normal rate. Regular rhythm.   Pulses:     Intact distal pulses.   Edema:     Peripheral edema absent.   Abdominal:      General: Bowel sounds are normal.      Palpations: Abdomen is soft.      Tenderness: There is no abdominal tenderness.   Musculoskeletal:         General: No deformity. Skin:     General: Skin is warm and dry.   Neurological:      Mental Status: Alert and oriented to person, place, and time.           ECG 12 Lead    Date/Time: 6/4/2021 12:45 PM  Performed by: Caroline Wesley MD  Authorized by: Caroline Wesley MD   Comparison: compared with previous ECG from 1/30/2019  Comparison to previous ECG: LVH not noted on today's EKG.  Rhythm: sinus rhythm  Conduction: conduction normal    Clinical impression: normal ECG                  Assessment:   Assessment/Plan      Diagnoses and all orders for this visit:    1. LVH (left ventricular hypertrophy) (Primary)    2. Essential hypertension    Other orders  -     ECG 12 Lead      Assessment:  1. Mild LVH, noted previously on " echocardiogram and previous EKG.  Status post weight loss with no evidence on EKG today.  Patient overall asymptomatic.  2. Hypertension, improved with weight loss.  No longer requiring medical therapy.    Plan:  1. Continue current medications.  2. Encouraged smoking cessation.  3. Encouraged patient to obtain blood pressure monitor to check her blood pressure at home to ensure that she is not having intermittent hypertension that would require medical therapy.  4. Encourage decrease salt/sodium intake.  5. Encouraged routine exercise and weight maintenance with routine weighing.  6. Follow-up in 1 year's time with an EKG or sooner if needed.       Shannan ETIENNE scribed this dictation for Dr. Wesley.     I, Caroline Wesley MD, personally performed the services described in this documentation as scribed by the above named individual in my presence, and it is both accurate and complete.  6/4/2021  12:44 EDT        Dictated utilizing Dragon dictation

## 2021-07-26 DIAGNOSIS — E11.9 TYPE 2 DIABETES MELLITUS WITHOUT COMPLICATION, WITHOUT LONG-TERM CURRENT USE OF INSULIN (HCC): ICD-10-CM

## 2021-07-26 RX ORDER — DULAGLUTIDE 0.75 MG/.5ML
INJECTION, SOLUTION SUBCUTANEOUS
Qty: 2 PEN | Refills: 4 | Status: SHIPPED | OUTPATIENT
Start: 2021-07-26 | End: 2022-03-01

## 2021-09-02 ENCOUNTER — OFFICE VISIT (OUTPATIENT)
Dept: INTERNAL MEDICINE | Facility: CLINIC | Age: 36
End: 2021-09-02

## 2021-09-02 VITALS
SYSTOLIC BLOOD PRESSURE: 134 MMHG | HEART RATE: 95 BPM | OXYGEN SATURATION: 99 % | WEIGHT: 176.6 LBS | HEIGHT: 62 IN | TEMPERATURE: 97.2 F | BODY MASS INDEX: 32.5 KG/M2 | DIASTOLIC BLOOD PRESSURE: 84 MMHG

## 2021-09-02 DIAGNOSIS — K21.9 GASTROESOPHAGEAL REFLUX DISEASE WITHOUT ESOPHAGITIS: ICD-10-CM

## 2021-09-02 DIAGNOSIS — E03.8 OTHER SPECIFIED HYPOTHYROIDISM: ICD-10-CM

## 2021-09-02 DIAGNOSIS — E11.9 TYPE 2 DIABETES MELLITUS WITHOUT COMPLICATION, WITHOUT LONG-TERM CURRENT USE OF INSULIN (HCC): Primary | ICD-10-CM

## 2021-09-02 DIAGNOSIS — F51.01 PRIMARY INSOMNIA: ICD-10-CM

## 2021-09-02 LAB — HBA1C MFR BLD: 5.7 %

## 2021-09-02 PROCEDURE — 99214 OFFICE O/P EST MOD 30 MIN: CPT | Performed by: NURSE PRACTITIONER

## 2021-09-02 PROCEDURE — 83036 HEMOGLOBIN GLYCOSYLATED A1C: CPT | Performed by: NURSE PRACTITIONER

## 2021-09-02 PROCEDURE — 3044F HG A1C LEVEL LT 7.0%: CPT | Performed by: NURSE PRACTITIONER

## 2021-09-02 RX ORDER — LANCETS 28 GAUGE
1 EACH MISCELLANEOUS DAILY
Qty: 100 EACH | Refills: 10 | Status: SHIPPED | OUTPATIENT
Start: 2021-09-02 | End: 2022-12-05 | Stop reason: SDUPTHER

## 2021-09-02 RX ORDER — BLOOD-GLUCOSE METER
1 KIT MISCELLANEOUS 2 TIMES DAILY
Qty: 100 EACH | Refills: 12 | Status: SHIPPED | OUTPATIENT
Start: 2021-09-02 | End: 2022-12-05

## 2021-09-02 NOTE — PROGRESS NOTES
SUBJECTIVE:  Diabetes Mellitus: Yasmin Singleton 35 y.o. female for follow up of diabetes. Symptoms: none. Symptoms have been well-controlled. Patient denies none.  Evaluation to date has been included: hemoglobin A1C.  Home sugars: patient does not check sugars. Treatment to date: Trulicity and Glucophage XR which which has been effective.      Current Outpatient Medications   Medication Sig Dispense Refill   • famotidine (PEPCID) 20 MG tablet Take 1 tablet by mouth 2 (Two) Times a Day As Needed for Heartburn. 180 tablet 2   • glucose blood (FREESTYLE LITE) test strip 1 each by Other route 2 (Two) Times a Day. Use as instructed 100 each 12   • Insulin Pen Needle (PEN NEEDLES) 31G X 5 MM misc 1.8 mL Daily. 100 each 2   • Lancets (freestyle) lancets 1 each by Other route Daily. Use as instructed 100 each 10   • levothyroxine (SYNTHROID, LEVOTHROID) 75 MCG tablet Take 1 tablet by mouth Daily. 90 tablet 1   • metFORMIN ER (GLUCOPHAGE-XR) 750 MG 24 hr tablet Take 1 tablet by mouth Daily With Breakfast for 30 days. 30 tablet 5   • prenatal vitamin (prenatal, CLASSIC, vitamin) tablet Take 1 tablet by mouth Daily. 30 tablet 11   • traZODone (DESYREL) 50 MG tablet Take 1 tablet by mouth At Night As Needed for Sleep. (Patient taking differently: Take 50 mg by mouth As Needed for Sleep.) 30 tablet 2   • Trulicity 0.75 MG/0.5ML solution pen-injector INJECT 0.75 MG UNDER THE SKIN ONCE WEEKLY 2 pen 4     No current facility-administered medications for this visit.        Review of Systems    OBJECTIVE:  Appearance: alert, well appearing, and in no distress.  Vitals:    09/02/21 1424   BP: 134/84   Pulse: 95   Temp: 97.2 °F (36.2 °C)   SpO2: 99%        Physical Exam    Results for orders placed or performed in visit on 09/02/21   POCT glycated hemoglobin, total    Specimen: Urine   Result Value Ref Range    Hemoglobin A1C 5.7 %       ASSESSMENT:  Diabetes Mellitus: well controlled, asymptomatic and no significant medication  side effects noted    PLAN:  See orders for this visit as documented in the electronic medical record.    Issues reviewed with her: home glucose monitoring emphasized and glycohemoglobin and other lab monitoring discussed.     I have recommended the following steps for improving diabetic care and outcome to her: home glucose monitoring emphasized and glycohemoglobin and other lab monitoring discussed. A followup visit will be scheduled in the near future to review and reinforce the importance of careful diabetic control to improve long term outcomes.     Diagnoses and all orders for this visit:    1. Type 2 diabetes mellitus without complication, without long-term current use of insulin (CMS/[facility]) (Primary)  -     POCT glycated hemoglobin, total  -     glucose blood (FREESTYLE LITE) test strip; 1 each by Other route 2 (Two) Times a Day. Use as instructed  Dispense: 100 each; Refill: 12  -     Lancets (freestyle) lancets; 1 each by Other route Daily. Use as instructed  Dispense: 100 each; Refill: 10    2. Other specified hypothyroidism    3. Primary insomnia    4. Gastroesophageal reflux disease without esophagitis     OK to stop Trulicity as long as monitors sugars closely since A1c 5.7  Schedule physical for Dec  DM well controlled - continue Glucophage XR  Thyroid well controlled with Levothyroxine - will cont   Gerd controlled with Pepcid - will cont    Return in about 3 months (around 12/2/2021) for Annual.    JAIMIE Roland

## 2021-09-08 ENCOUNTER — PRIOR AUTHORIZATION (OUTPATIENT)
Dept: INTERNAL MEDICINE | Facility: CLINIC | Age: 36
End: 2021-09-08

## 2021-09-13 NOTE — TELEPHONE ENCOUNTER
Test strips shouldn't require prior authorization. Called pt's pharmacy to inform and to change to glucose test strips that are comparable to the brand that was ordered. Pharmacy wasn't open at this time. LVM on provider line informing of the above request with office number given.

## 2021-09-24 DIAGNOSIS — E03.8 OTHER SPECIFIED HYPOTHYROIDISM: ICD-10-CM

## 2021-09-24 DIAGNOSIS — E11.9 TYPE 2 DIABETES MELLITUS WITHOUT COMPLICATION, WITHOUT LONG-TERM CURRENT USE OF INSULIN (HCC): ICD-10-CM

## 2021-09-24 RX ORDER — METFORMIN HYDROCHLORIDE 750 MG/1
TABLET, EXTENDED RELEASE ORAL
Qty: 90 TABLET | Refills: 1 | Status: SHIPPED | OUTPATIENT
Start: 2021-09-24 | End: 2022-03-24

## 2021-09-24 RX ORDER — LEVOTHYROXINE SODIUM 0.07 MG/1
TABLET ORAL
Qty: 90 TABLET | Refills: 1 | Status: SHIPPED | OUTPATIENT
Start: 2021-09-24 | End: 2022-03-03 | Stop reason: DRUGHIGH

## 2022-02-24 ENCOUNTER — TELEPHONE (OUTPATIENT)
Dept: INTERNAL MEDICINE | Facility: CLINIC | Age: 37
End: 2022-02-24

## 2022-02-24 NOTE — TELEPHONE ENCOUNTER
Caller: Yasmin Singleton    Relationship: Self    Best call back number: [phone]    What is the medical concern/diagnosis: ITCHY RASH IN SCALP AND POSSIBLE ALLERGIC REACTION    What specialty or service is being requested: DERMATOLOGIST    What is the provider, practice or medical service name: SHE WOULD LIKE TO RETURN TO THE DERMATOLOGIST SHE SAW LAST TIME    What is the office location: [place], [place]    What is the office phone number:     Any additional details: THERE ARE BUMPS AND A RASH ON HER SCALP. SHE IS THINKING IT MIGHT BE AN ALLERGIC REACTION TO SOMETHING.

## 2022-03-01 ENCOUNTER — OFFICE VISIT (OUTPATIENT)
Dept: INTERNAL MEDICINE | Facility: CLINIC | Age: 37
End: 2022-03-01

## 2022-03-01 VITALS
TEMPERATURE: 98.4 F | HEART RATE: 76 BPM | BODY MASS INDEX: 34.55 KG/M2 | SYSTOLIC BLOOD PRESSURE: 128 MMHG | HEIGHT: 63 IN | OXYGEN SATURATION: 99 % | WEIGHT: 195 LBS | DIASTOLIC BLOOD PRESSURE: 82 MMHG

## 2022-03-01 DIAGNOSIS — E11.9 TYPE 2 DIABETES MELLITUS WITHOUT COMPLICATION, WITHOUT LONG-TERM CURRENT USE OF INSULIN: ICD-10-CM

## 2022-03-01 DIAGNOSIS — E66.09 CLASS 1 OBESITY DUE TO EXCESS CALORIES WITH SERIOUS COMORBIDITY AND BODY MASS INDEX (BMI) OF 34.0 TO 34.9 IN ADULT: ICD-10-CM

## 2022-03-01 DIAGNOSIS — E55.9 VITAMIN D DEFICIENCY: ICD-10-CM

## 2022-03-01 DIAGNOSIS — L24.3 IRRITANT CONTACT DERMATITIS DUE TO COSMETICS: ICD-10-CM

## 2022-03-01 DIAGNOSIS — E78.5 DYSLIPIDEMIA: ICD-10-CM

## 2022-03-01 DIAGNOSIS — E03.8 OTHER SPECIFIED HYPOTHYROIDISM: ICD-10-CM

## 2022-03-01 DIAGNOSIS — Z00.00 ROUTINE PHYSICAL EXAMINATION: Primary | ICD-10-CM

## 2022-03-01 DIAGNOSIS — F17.210 CIGARETTE NICOTINE DEPENDENCE WITHOUT COMPLICATION: ICD-10-CM

## 2022-03-01 LAB
EXPIRATION DATE: NORMAL
HBA1C MFR BLD: 6.4 %
Lab: NORMAL

## 2022-03-01 PROCEDURE — 99395 PREV VISIT EST AGE 18-39: CPT | Performed by: NURSE PRACTITIONER

## 2022-03-01 PROCEDURE — 2014F MENTAL STATUS ASSESS: CPT | Performed by: NURSE PRACTITIONER

## 2022-03-01 PROCEDURE — 83036 HEMOGLOBIN GLYCOSYLATED A1C: CPT | Performed by: NURSE PRACTITIONER

## 2022-03-01 PROCEDURE — 3008F BODY MASS INDEX DOCD: CPT | Performed by: NURSE PRACTITIONER

## 2022-03-01 NOTE — PROGRESS NOTES
Subjective   Chief Complaint   Patient presents with   • Annual Exam       Yasmin Singleton is a 36 y.o. female here today for annual exam.    Overall healthy: Yes  Regular exercise:  No, walks a lot between her 2 jobs  Diet is well balanced:  No  Vitamin Supplement:  Yes  Alcohol intake:  rarely   Tobacco use:  Yes, smokes 1 pkpd    Cardiovascular risk is low:  Yes   Menstrual cycle regular:  Yes  PAP:  Last pap 2019  Concern for STDs:  No  Mammogram:  N/A  Last colon screening:  N/A  Regular dental exam:  Yes   Regular eye exam:  Yes  Immunizations up to date:  Yes  Wear a seatbelt regularly:  Yes  Wear sunscreen regularly when outdoors:  No    Review of Systems   Constitutional: Negative for activity change, appetite change and fatigue.   HENT: Negative for congestion.    Respiratory: Negative for cough and shortness of breath.    Cardiovascular: Negative for chest pain and leg swelling.   Gastrointestinal: Negative for abdominal pain.   Neurological: Negative for dizziness, weakness and confusion.   Psychiatric/Behavioral: Negative for behavioral problems and decreased concentration.       The following portions of the patient's history were reviewed and updated as appropriate: allergies, current medications, past family history, past medical history, past social history, past surgical history and problem list.    Past Medical History:   Diagnosis Date   • Diabetes (HCC)    • GERD (gastroesophageal reflux disease)    • History of recurrent UTIs    • Seasonal allergies      Past Surgical History:   Procedure Laterality Date   • WISDOM TOOTH EXTRACTION  2015     Family History   Problem Relation Age of Onset   • Cancer Father         Unsure what kind   • Hypertension Father    • Stroke Father    • Kidney failure Mother      Social History     Tobacco Use   Smoking Status Current Every Day Smoker   • Packs/day: 0.50   • Years: 12.00   • Pack years: 6.00   • Types: Cigarettes   Smokeless Tobacco Never Used      Social  "History     Substance and Sexual Activity   Alcohol Use Yes    Comment: on occasion, at most 1-2 drinks a month      No Known Allergies    Current Outpatient Medications on File Prior to Visit   Medication Sig   • famotidine (PEPCID) 20 MG tablet Take 1 tablet by mouth 2 (Two) Times a Day As Needed for Heartburn.   • glucose blood (FREESTYLE LITE) test strip 1 each by Other route 2 (Two) Times a Day. Use as instructed   • Lancets (freestyle) lancets 1 each by Other route Daily. Use as instructed   • levothyroxine (SYNTHROID, LEVOTHROID) 75 MCG tablet TAKE ONE TABLET BY MOUTH DAILY   • metFORMIN ER (GLUCOPHAGE-XR) 750 MG 24 hr tablet TAKE ONE TABLET BY MOUTH DAILY WITH BREAKFAST   • prenatal vitamin (prenatal, CLASSIC, vitamin) tablet Take 1 tablet by mouth Daily.   • traZODone (DESYREL) 50 MG tablet Take 1 tablet by mouth At Night As Needed for Sleep. (Patient taking differently: Take 50 mg by mouth As Needed for Sleep.)   • vitamin D (ERGOCALCIFEROL) 1.25 MG (00553 UT) capsule capsule    • [DISCONTINUED] Insulin Pen Needle (PEN NEEDLES) 31G X 5 MM misc 1.8 mL Daily.   • [DISCONTINUED] predniSONE (DELTASONE) 20 MG tablet Take 1 tablet by mouth 2 (Two) Times a Day for 7 days.   • [DISCONTINUED] Trulicity 0.75 MG/0.5ML solution pen-injector INJECT 0.75 MG UNDER THE SKIN ONCE WEEKLY     No current facility-administered medications on file prior to visit.       Objective   Vitals:    03/01/22 1152   BP: 128/82   Pulse: 76   Temp: 98.4 °F (36.9 °C)   TempSrc: Temporal   SpO2: 99%   Weight: 88.5 kg (195 lb)   Height: 160 cm (63\")     Body mass index is 34.54 kg/m².    Physical Exam  Vitals and nursing note reviewed.   Constitutional:       Appearance: She is obese.   HENT:      Head: Normocephalic.      Right Ear: Tympanic membrane, ear canal and external ear normal.      Left Ear: Tympanic membrane, ear canal and external ear normal.      Mouth/Throat:      Mouth: Mucous membranes are moist.      Pharynx: Oropharynx is " clear.   Eyes:      Extraocular Movements: Extraocular movements intact.      Conjunctiva/sclera: Conjunctivae normal.      Pupils: Pupils are equal, round, and reactive to light.   Neck:      Thyroid: No thyromegaly or thyroid tenderness.      Vascular: No carotid bruit.   Cardiovascular:      Rate and Rhythm: Normal rate and regular rhythm.      Pulses: Normal pulses.      Heart sounds: Normal heart sounds. No murmur heard.      Pulmonary:      Effort: Pulmonary effort is normal.      Breath sounds: Normal breath sounds. No wheezing, rhonchi or rales.   Abdominal:      General: Bowel sounds are normal. There is no distension.      Palpations: Abdomen is soft.      Tenderness: There is no abdominal tenderness.   Musculoskeletal:      Comments: Full ROM of all major joints   Lymphadenopathy:      Cervical: No cervical adenopathy.   Skin:     General: Skin is warm and dry.      Capillary Refill: Capillary refill takes less than 2 seconds.      Comments: Scalp with reddened dry areas   Neurological:      General: No focal deficit present.      Mental Status: She is alert and oriented to person, place, and time.      GCS: GCS eye subscore is 4. GCS verbal subscore is 5. GCS motor subscore is 6.      Motor: Motor function is intact.      Coordination: Coordination is intact.      Gait: Gait is intact.   Psychiatric:         Attention and Perception: Attention normal.         Mood and Affect: Mood normal.         Behavior: Behavior normal.         Thought Content: Thought content normal.         Cognition and Memory: Cognition and memory normal.         Judgment: Judgment normal.         Patient's Body mass index is 34.54 kg/m². indicating that she is obese (BMI >30). Obesity-related health conditions include the following: diabetes mellitus. Obesity is newly identified. BMI is is above average; BMI management plan is completed. We discussed portion control and increasing exercise..     Assessment/Plan     Current  Outpatient Medications:   •  famotidine (PEPCID) 20 MG tablet, Take 1 tablet by mouth 2 (Two) Times a Day As Needed for Heartburn., Disp: 180 tablet, Rfl: 2  •  glucose blood (FREESTYLE LITE) test strip, 1 each by Other route 2 (Two) Times a Day. Use as instructed, Disp: 100 each, Rfl: 12  •  Lancets (freestyle) lancets, 1 each by Other route Daily. Use as instructed, Disp: 100 each, Rfl: 10  •  levothyroxine (SYNTHROID, LEVOTHROID) 75 MCG tablet, TAKE ONE TABLET BY MOUTH DAILY, Disp: 90 tablet, Rfl: 1  •  metFORMIN ER (GLUCOPHAGE-XR) 750 MG 24 hr tablet, TAKE ONE TABLET BY MOUTH DAILY WITH BREAKFAST, Disp: 90 tablet, Rfl: 1  •  prenatal vitamin (prenatal, CLASSIC, vitamin) tablet, Take 1 tablet by mouth Daily., Disp: 30 tablet, Rfl: 11  •  traZODone (DESYREL) 50 MG tablet, Take 1 tablet by mouth At Night As Needed for Sleep. (Patient taking differently: Take 50 mg by mouth As Needed for Sleep.), Disp: 30 tablet, Rfl: 2  •  vitamin D (ERGOCALCIFEROL) 1.25 MG (17137 UT) capsule capsule, , Disp: , Rfl:       Results for orders placed or performed in visit on 03/01/22   POC Glycosylated Hemoglobin (Hb A1C)    Specimen: Blood   Result Value Ref Range    Hemoglobin A1C 6.4 %    Lot Number [id number]     Expiration Date 09/28/2023          Problem List Items Addressed This Visit        Endocrine and Metabolic    Type 2 diabetes mellitus without complication, without long-term current use of insulin ([facility])    Relevant Orders    POC Glycosylated Hemoglobin (Hb A1C) (Completed)    Hypothyroid    Relevant Orders    CBC w AUTO Differential    Comprehensive Metabolic Panel    TSH Rfx On Abnormal To Free T4    Vitamin D deficiency    Relevant Orders    Vitamin D 25 hydroxy      Other Visit Diagnoses     Routine physical examination    -  Primary    Dyslipidemia        Relevant Orders    Lipid Panel    Cigarette nicotine dependence without complication        Class 1 obesity due to excess calories with serious comorbidity and body  mass index (BMI) of 34.0 to 34.9 in adult        Irritant contact dermatitis due to cosmetics            Recommend she call her gyne for pap  Not due for mammo/colonoscopy at this time  Declines smoking cessation at this time  Pt to check with dermatology about allergy testing  DM still well controlled         Counseling was given to patient for the following topics: appropriate exercise, healthy eating habits, disease prevention, risk factors for cancer, importance of self breast exam and breast health, importance of smoking cessation, and discussed various options for quitting and risks of smoking (including electronic cigarettes) including cancer and death.      Plan of care reviewed with the patient at the conclusion of today's visit.  Education was provided regarding diagnosis, management, and any prescribed or recommended OTC medications.  Patient verbalized understanding of and agreement with management plan.     Return in about 6 months (around 9/1/2022) for Diabetes.        Caleb Parks, JAIMIE

## 2022-03-03 ENCOUNTER — LAB (OUTPATIENT)
Dept: LAB | Facility: HOSPITAL | Age: 37
End: 2022-03-03

## 2022-03-03 DIAGNOSIS — E03.8 OTHER SPECIFIED HYPOTHYROIDISM: Primary | ICD-10-CM

## 2022-03-03 LAB
ALBUMIN SERPL-MCNC: 4.3 G/DL (ref 3.5–5.2)
ALBUMIN/GLOB SERPL: 1.7 G/DL
ALP SERPL-CCNC: 65 U/L (ref 39–117)
ALT SERPL W P-5'-P-CCNC: 20 U/L (ref 1–33)
ANION GAP SERPL CALCULATED.3IONS-SCNC: 10 MMOL/L (ref 5–15)
AST SERPL-CCNC: 12 U/L (ref 1–32)
BILIRUB SERPL-MCNC: 0.3 MG/DL (ref 0–1.2)
BUN SERPL-MCNC: 12 MG/DL (ref 6–20)
BUN/CREAT SERPL: 15.8 (ref 7–25)
CALCIUM SPEC-SCNC: 9.6 MG/DL (ref 8.6–10.5)
CHLORIDE SERPL-SCNC: 105 MMOL/L (ref 98–107)
CHOLEST SERPL-MCNC: 164 MG/DL (ref 0–200)
CO2 SERPL-SCNC: 25 MMOL/L (ref 22–29)
CREAT SERPL-MCNC: 0.76 MG/DL (ref 0.57–1)
EGFRCR SERPLBLD CKD-EPI 2021: 104.9 ML/MIN/1.73
GLOBULIN UR ELPH-MCNC: 2.6 GM/DL
GLUCOSE SERPL-MCNC: 127 MG/DL (ref 65–99)
HDLC SERPL-MCNC: 52 MG/DL (ref 40–60)
LDLC SERPL CALC-MCNC: 86 MG/DL (ref 0–100)
LDLC/HDLC SERPL: 1.59 {RATIO}
POTASSIUM SERPL-SCNC: 4.5 MMOL/L (ref 3.5–5.2)
PROT SERPL-MCNC: 6.9 G/DL (ref 6–8.5)
SODIUM SERPL-SCNC: 140 MMOL/L (ref 136–145)
T4 FREE SERPL-MCNC: 1.31 NG/DL (ref 0.93–1.7)
TRIGL SERPL-MCNC: 147 MG/DL (ref 0–150)
TSH SERPL DL<=0.05 MIU/L-ACNC: 5.62 UIU/ML (ref 0.27–4.2)
VLDLC SERPL-MCNC: 26 MG/DL (ref 5–40)

## 2022-03-03 PROCEDURE — 84443 ASSAY THYROID STIM HORMONE: CPT | Performed by: NURSE PRACTITIONER

## 2022-03-03 PROCEDURE — 80061 LIPID PANEL: CPT | Performed by: NURSE PRACTITIONER

## 2022-03-03 PROCEDURE — 84439 ASSAY OF FREE THYROXINE: CPT | Performed by: NURSE PRACTITIONER

## 2022-03-03 PROCEDURE — 80053 COMPREHEN METABOLIC PANEL: CPT | Performed by: NURSE PRACTITIONER

## 2022-03-03 PROCEDURE — 85025 COMPLETE CBC W/AUTO DIFF WBC: CPT | Performed by: NURSE PRACTITIONER

## 2022-03-03 PROCEDURE — 82306 VITAMIN D 25 HYDROXY: CPT | Performed by: NURSE PRACTITIONER

## 2022-03-03 RX ORDER — LEVOTHYROXINE SODIUM 0.1 MG/1
100 TABLET ORAL DAILY
Qty: 30 TABLET | Refills: 5 | Status: SHIPPED | OUTPATIENT
Start: 2022-03-03 | End: 2022-03-23 | Stop reason: SDUPTHER

## 2022-03-20 DIAGNOSIS — R12 HEARTBURN: ICD-10-CM

## 2022-03-21 RX ORDER — FAMOTIDINE 20 MG/1
TABLET, FILM COATED ORAL
Qty: 180 TABLET | Refills: 2 | Status: SHIPPED | OUTPATIENT
Start: 2022-03-21 | End: 2022-09-20

## 2022-03-23 ENCOUNTER — TELEPHONE (OUTPATIENT)
Dept: INTERNAL MEDICINE | Facility: CLINIC | Age: 37
End: 2022-03-23

## 2022-03-23 DIAGNOSIS — E03.8 OTHER SPECIFIED HYPOTHYROIDISM: ICD-10-CM

## 2022-03-23 RX ORDER — LEVOTHYROXINE SODIUM 0.1 MG/1
100 TABLET ORAL DAILY
Qty: 30 TABLET | Refills: 5 | Status: SHIPPED | OUTPATIENT
Start: 2022-03-23

## 2022-03-23 NOTE — TELEPHONE ENCOUNTER
Pt said the [facility]  Pharmacy on [facility] never received orders for the synthroid 10 mcg, please advise

## 2022-03-24 DIAGNOSIS — E11.9 TYPE 2 DIABETES MELLITUS WITHOUT COMPLICATION, WITHOUT LONG-TERM CURRENT USE OF INSULIN: ICD-10-CM

## 2022-03-24 RX ORDER — METFORMIN HYDROCHLORIDE 750 MG/1
TABLET, EXTENDED RELEASE ORAL
Qty: 90 TABLET | Refills: 1 | Status: SHIPPED | OUTPATIENT
Start: 2022-03-24 | End: 2022-09-20

## 2022-03-30 ENCOUNTER — OFFICE VISIT (OUTPATIENT)
Dept: OBSTETRICS AND GYNECOLOGY | Facility: CLINIC | Age: 37
End: 2022-03-30

## 2022-03-30 VITALS
WEIGHT: 196 LBS | DIASTOLIC BLOOD PRESSURE: 70 MMHG | SYSTOLIC BLOOD PRESSURE: 118 MMHG | RESPIRATION RATE: 16 BRPM | BODY MASS INDEX: 34.72 KG/M2

## 2022-03-30 DIAGNOSIS — N76.0 BV (BACTERIAL VAGINOSIS): ICD-10-CM

## 2022-03-30 DIAGNOSIS — E28.2 PCOS (POLYCYSTIC OVARIAN SYNDROME): ICD-10-CM

## 2022-03-30 DIAGNOSIS — B37.31 YEAST VAGINITIS: ICD-10-CM

## 2022-03-30 DIAGNOSIS — Z01.411 ENCOUNTER FOR GYNECOLOGICAL EXAMINATION WITH ABNORMAL FINDING: Primary | ICD-10-CM

## 2022-03-30 DIAGNOSIS — B96.89 BV (BACTERIAL VAGINOSIS): ICD-10-CM

## 2022-03-30 DIAGNOSIS — Z30.8 ENCOUNTER FOR OTHER CONTRACEPTIVE MANAGEMENT: ICD-10-CM

## 2022-03-30 PROCEDURE — 2014F MENTAL STATUS ASSESS: CPT | Performed by: NURSE PRACTITIONER

## 2022-03-30 PROCEDURE — 99395 PREV VISIT EST AGE 18-39: CPT | Performed by: NURSE PRACTITIONER

## 2022-03-30 PROCEDURE — 87210 SMEAR WET MOUNT SALINE/INK: CPT | Performed by: NURSE PRACTITIONER

## 2022-03-30 PROCEDURE — 3008F BODY MASS INDEX DOCD: CPT | Performed by: NURSE PRACTITIONER

## 2022-03-30 RX ORDER — METRONIDAZOLE 500 MG/1
500 TABLET ORAL 2 TIMES DAILY
Qty: 14 TABLET | Refills: 0 | Status: SHIPPED | OUTPATIENT
Start: 2022-03-30 | End: 2022-04-06

## 2022-03-30 RX ORDER — FLUCONAZOLE 150 MG/1
TABLET ORAL
Qty: 4 TABLET | Refills: 0 | Status: SHIPPED | OUTPATIENT
Start: 2022-03-30 | End: 2022-06-17

## 2022-03-30 NOTE — PROGRESS NOTES
Annual Visit     Patient Name: Yasmin Singleton  : 1985   MRN: [id number]   Care Team: Patient Care Team:  Caleb Parks APRN as PCP - General (Family Medicine)    Chief Complaint:    Chief Complaint   Patient presents with   • Annual Exam     Vag odor       HPI: Yasmin Singleton is a 36 y.o. year old  presenting to be seen for her gynecologic exam - new pt.   Former Dr. Solano patient - she hasn't been seen here in several yrs   Hx PCOS also DM 2 - fairly well controlled with metformin and diet   Periods monthly for the last year   She has had times where they are q 5-6 wks but typically not 60 days or more apart   LMP 3/9/22   Not sexually active in the last 2 months   Faithful with condoms when she is     Hx hidradenitis suppurativa - states she does develop cysts in the right axilla and upper inner thighs     C/o vaginal odor x awhile now   No itching or burning   No pelvic pain   No concern about STIs     Pap smear 2020 WNL and HPV negative     Smoker       Subjective      /70   Resp 16   Wt 88.9 kg (196 lb)   LMP 2022   Breastfeeding No   BMI 34.72 kg/m²     BMI reviewed: Body mass index is 34.72 kg/m².      Objective     Physical Exam    Neuro: alert and oriented to person, place and time   General:  alert; cooperative; well developed; well nourished   Skin:  No suspicious lesions seen   Thyroid: normal to inspection and palpation   Lungs:  breathing is unlabored  clear to auscultation bilaterally   Heart:  regular rate and rhythm, S1, S2 normal, no murmur, click, rub or gallop  normal apical impulse   Breasts:  Examined in supine position  Symmetric without masses or skin dimpling  Nipples normal without inversion, lesions or discharge  There are no palpable axillary nodes  Fibrocystic changes are present both breasts without a discrete mass   Abdomen: soft, non-tender; no masses  no umbilical or inguinal hernias are present  no hepato-splenomegaly   Pelvis: Clinical staff  was present for exam  External genitalia:  normal appearance of the external genitalia including Bartholin's and [place]'s glands.  :  urethral meatus normal;  Vaginal:  normal pink mucosa without prolapse or lesions. discharge present -  white; wet prep done: clue cells are present and pseudo-hyphae are present;  Cervix:  normal appearance.  Uterus:  normal size, shape and consistency.  Adnexa:  normal bimanual exam of the adnexa.  Rectal:  digital rectal exam not performed; anus visually normal appearing.     Acanthosis nigricans noted     Assessment / Plan      Assessment  Problems Addressed This Visit    ICD-10-CM ICD-9-CM   1. Encounter for gynecological examination with abnormal finding  Z01.411 V72.31   2. Encounter for other contraceptive management  Z30.8 V25.8   3. BV (bacterial vaginosis)  N76.0 616.10    B96.89 041.9   4. Yeast vaginitis  B37.3 112.1   5. PCOS (polycystic ovarian syndrome)  E28.2 256.4       Plan    Pap smear not indicated today     Discussed monthly SBEs and fibrocystic breast changes   States she does have intermittent right breast tenderness x many yrs now   It is always intermittent, never persistent   She does drink a good amt of caffeine   Discussed interventions including Vit e oil massaged into breast tissue and caffeine reduction   If breast tenderness is ever persistent or if changes noted on SBEs she will RTC - pt v/u     Wet mount = yeast and BV   Discussed txment and prevention   Flagyl and diflucan to pharmacy   If sx do not resolve, she will let me know   No intercourse during txment   Discussed Rephresh OTC twice weekly at bedtime     Discussed irregular periods with PCOS   If ever 60 days or more between periods, she will call to let me know for Provera - pt v/u     Desires to cont with faithful condom use     AV 1 yr           Follow Up  Return in about 1 year (around 3/30/2023) for Annual physical.  Patient was given instructions and counseling regarding her condition  or for health maintenance advice. Please see specific information pulled into the AVS if appropriate.     Irena Holcomb, JAIMIE  March 30, 2022  09:28 EDT

## 2022-06-17 ENCOUNTER — OFFICE VISIT (OUTPATIENT)
Dept: CARDIOLOGY | Facility: CLINIC | Age: 37
End: 2022-06-17

## 2022-06-17 VITALS
SYSTOLIC BLOOD PRESSURE: 126 MMHG | OXYGEN SATURATION: 97 % | DIASTOLIC BLOOD PRESSURE: 78 MMHG | HEIGHT: 63 IN | BODY MASS INDEX: 32.78 KG/M2 | HEART RATE: 96 BPM | WEIGHT: 185 LBS

## 2022-06-17 DIAGNOSIS — I10 ESSENTIAL HYPERTENSION: ICD-10-CM

## 2022-06-17 DIAGNOSIS — I51.7 LVH (LEFT VENTRICULAR HYPERTROPHY): Primary | ICD-10-CM

## 2022-06-17 PROCEDURE — 93000 ELECTROCARDIOGRAM COMPLETE: CPT | Performed by: INTERNAL MEDICINE

## 2022-06-17 PROCEDURE — 99213 OFFICE O/P EST LOW 20 MIN: CPT | Performed by: INTERNAL MEDICINE

## 2022-06-17 NOTE — PROGRESS NOTES
[facility]    Encounter Date: 2022    Patient ID: Yasmin Singleton is a 36 y.o. female.  : 1985     PCP: Caleb Parks APRN       Chief Complaint: LVH (left ventricular hypertrophy)      PROBLEM LIST:  1. LVH:  a. Normal routine GXT 2019.  b. Echo, 2019: EF 60%.  Mild LVH.  Normal valves.  2. Hypertension, history.  3. Diabetes mellitus type II:  a. Diagnosed at age 22.  4. Tobacco abuse.  5. Hypothyroidism.  6. GERD.  7. Surgeries:  a. [place] teeth extraction.    History of Present Illness  Patient presents today for a 12 month follow-up with a history of left ventricular hypertrophy and cardiac risk factors. Since last visit, she has been doing very well from cardiac standpoint.  States that she works 2 jobs and remains active and busy, gets plenty of exercise.  Her blood pressure had been running normal and PCP took her off blood pressure medications.  She has been monitoring her blood pressure and this has been normal.  No complaints of chest pain shortness of breath edema palpitations or syncope.     No Known Allergies      Current Outpatient Medications:   •  famotidine (PEPCID) 20 MG tablet, TAKE ONE TABLET BY MOUTH TWICE A DAY AS NEEDED FOR HEARTBURN, Disp: 180 tablet, Rfl: 2  •  glucose blood (FREESTYLE LITE) test strip, 1 each by Other route 2 (Two) Times a Day. Use as instructed, Disp: 100 each, Rfl: 12  •  Lancets (freestyle) lancets, 1 each by Other route Daily. Use as instructed, Disp: 100 each, Rfl: 10  •  levothyroxine (SYNTHROID, LEVOTHROID) 100 MCG tablet, Take 1 tablet by mouth Daily., Disp: 30 tablet, Rfl: 5  •  metFORMIN ER (GLUCOPHAGE-XR) 750 MG 24 hr tablet, TAKE ONE TABLET BY MOUTH DAILY WITH BREAKFAST, Disp: 90 tablet, Rfl: 1  •  prenatal vitamin (prenatal, CLASSIC, vitamin) tablet, Take 1 tablet by mouth Daily., Disp: 30 tablet, Rfl: 11  •  traZODone (DESYREL) 50 MG tablet, Take 1 tablet by mouth At Night As Needed for  "Sleep. (Patient taking differently: Take 50 mg by mouth As Needed for Sleep.), Disp: 30 tablet, Rfl: 2  •  vitamin D (ERGOCALCIFEROL) 1.25 MG (04752 UT) capsule capsule, Take 50,000 Units by mouth Every 7 (Seven) Days., Disp: , Rfl:     The following portions of the patient's history were reviewed and updated as appropriate: allergies, current medications, past family history, past medical history, past social history, past surgical history and problem list.    ROS  Review of Systems   Constitution: Negative for chills, fever, fatigue, generalized weakness.   Cardiovascular: Negative for chest pain, dyspnea on exertion, leg swelling, palpitations, orthopnea, and syncope.   Respiratory: Negative for cough, shortness of breath, and wheezing.  HENT: Negative for ear pain, nosebleeds, and tinnitus.  Gastrointestinal: Negative for abdominal pain, constipation, diarrhea, nausea and vomiting.   Genitourinary: No urinary symptoms.  Musculoskeletal: Negative for muscle cramps.  Neurological: Negative for dizziness, headaches, loss of balance, numbness, and symptoms of stroke.  Psychiatric: Normal mental status.     All other systems reviewed and are negative.        Objective:     /78 (BP Location: Left arm, Patient Position: Sitting)   Pulse 96   Ht 160 cm (63\")   Wt 83.9 kg (185 lb)   SpO2 97%   BMI 32.77 kg/m²      Physical Exam  Constitutional: Patient appears well-developed and well-nourished.   HENT: HEENT exam unremarkable.   Neck: Neck supple. No JVD present. No carotid bruits.   Cardiovascular: Normal rate, regular rhythm and normal heart sounds. No murmur heard.   2+ symmetric pulses.   Pulmonary/Chest: Breath sounds normal. Does not exhibit tenderness.   Abdominal: Abdomen benign.   Musculoskeletal: Does not exhibit edema.   Neurological: Neurological exam unremarkable.   Vitals reviewed.    Data Review:   Lab Results   Component Value Date    GLUCOSE 127 (H) 03/03/2022    BUN 12 03/03/2022    " CREATININE 0.76 03/03/2022    BCR 15.8 03/03/2022    K 4.5 03/03/2022    CO2 25.0 03/03/2022    CALCIUM 9.6 03/03/2022    ALBUMIN 4.30 03/03/2022    AST 12 03/03/2022    ALT 20 03/03/2022     Lab Results   Component Value Date    CHOL 164 03/03/2022    TRIG 147 03/03/2022    HDL 52 03/03/2022    LDL 86 03/03/2022      Lab Results   Component Value Date    WBC 11.18 (H) 03/03/2022    RBC 4.78 03/03/2022    HGB 14.2 03/03/2022    HCT 43.3 03/03/2022    MCV 90.6 03/03/2022     03/03/2022     Lab Results   Component Value Date    TSH 5.620 (H) 03/03/2022     Lab Results   Component Value Date    HGBA1C 6.4 03/01/2022          ECG 12 Lead    Date/Time: 6/17/2022 1:31 PM  Performed by: Caroline Wesley MD  Authorized by: Caroline Wesley MD   Rhythm: sinus rhythm  BPM: 82    Clinical impression: normal ECG                 Assessment:      Diagnosis Plan   1. LVH (left ventricular hypertrophy)  Stable and asymptomatic.  No symptoms of angina or CHF   2. Essential hypertension  Well controlled.  Off medications continue monitoring blood pressure. Stay active and maintain a heart healthy diet.  Salt/sodium restriction recommended.     Plan:   Stable cardiac status.   Continue monitoring blood pressure.  With goal of less than 130/80.  Restrict salt/sodium, maintain heart healthy diet, continue regular exercise.  Monitor weight, try to lose some more weight and did not gain.  Continue current medications.   FU in 12 MO, sooner as needed.  Thank you for allowing us to participate in the care of your patient.     Scribed for Caroline Wesley MD by Dominga Amos. 6/17/2022  13:31 EDT    I, Caroline Wesley MD, personally performed the services described in this documentation as scribed by the above named individual in my presence, and it is both accurate and complete.  6/17/2022  13:29 EDT        Please note that portions of this note may have been completed with a voice recognition program. Efforts were made to edit the dictations,  but occasionally words are mistranscribed.

## 2022-09-20 ENCOUNTER — LAB (OUTPATIENT)
Dept: LAB | Facility: HOSPITAL | Age: 37
End: 2022-09-20

## 2022-09-20 ENCOUNTER — OFFICE VISIT (OUTPATIENT)
Dept: INTERNAL MEDICINE | Facility: CLINIC | Age: 37
End: 2022-09-20

## 2022-09-20 VITALS
WEIGHT: 184 LBS | TEMPERATURE: 97.6 F | HEIGHT: 63 IN | SYSTOLIC BLOOD PRESSURE: 118 MMHG | HEART RATE: 81 BPM | OXYGEN SATURATION: 99 % | DIASTOLIC BLOOD PRESSURE: 64 MMHG | BODY MASS INDEX: 32.6 KG/M2

## 2022-09-20 DIAGNOSIS — M54.50 CHRONIC MIDLINE LOW BACK PAIN WITHOUT SCIATICA: ICD-10-CM

## 2022-09-20 DIAGNOSIS — E55.9 VITAMIN D DEFICIENCY: ICD-10-CM

## 2022-09-20 DIAGNOSIS — G89.29 CHRONIC MIDLINE LOW BACK PAIN WITHOUT SCIATICA: ICD-10-CM

## 2022-09-20 DIAGNOSIS — Z23 NEED FOR INFLUENZA VACCINATION: ICD-10-CM

## 2022-09-20 DIAGNOSIS — R09.89 RUNNY NOSE: ICD-10-CM

## 2022-09-20 DIAGNOSIS — E03.8 OTHER SPECIFIED HYPOTHYROIDISM: ICD-10-CM

## 2022-09-20 DIAGNOSIS — N64.4 BREAST PAIN, RIGHT: ICD-10-CM

## 2022-09-20 DIAGNOSIS — E11.9 TYPE 2 DIABETES MELLITUS WITHOUT COMPLICATION, WITHOUT LONG-TERM CURRENT USE OF INSULIN: ICD-10-CM

## 2022-09-20 DIAGNOSIS — F41.1 GENERALIZED ANXIETY DISORDER: ICD-10-CM

## 2022-09-20 DIAGNOSIS — E11.9 TYPE 2 DIABETES MELLITUS WITHOUT COMPLICATION, WITHOUT LONG-TERM CURRENT USE OF INSULIN: Primary | ICD-10-CM

## 2022-09-20 LAB
EXPIRATION DATE: NORMAL
HBA1C MFR BLD: 6.4 %
Lab: NORMAL

## 2022-09-20 PROCEDURE — 90686 IIV4 VACC NO PRSV 0.5 ML IM: CPT | Performed by: NURSE PRACTITIONER

## 2022-09-20 PROCEDURE — 83036 HEMOGLOBIN GLYCOSYLATED A1C: CPT | Performed by: NURSE PRACTITIONER

## 2022-09-20 PROCEDURE — 90471 IMMUNIZATION ADMIN: CPT | Performed by: NURSE PRACTITIONER

## 2022-09-20 PROCEDURE — 99214 OFFICE O/P EST MOD 30 MIN: CPT | Performed by: NURSE PRACTITIONER

## 2022-09-20 PROCEDURE — 3044F HG A1C LEVEL LT 7.0%: CPT | Performed by: NURSE PRACTITIONER

## 2022-09-20 RX ORDER — METFORMIN HYDROCHLORIDE 750 MG/1
TABLET, EXTENDED RELEASE ORAL
Qty: 90 TABLET | Refills: 1 | Status: SHIPPED | OUTPATIENT
Start: 2022-09-20

## 2022-09-20 RX ORDER — PRENATAL WITH FERROUS FUM AND FOLIC ACID 3080; 920; 120; 400; 22; 1.84; 3; 20; 10; 1; 12; 200; 27; 25; 2 [IU]/1; [IU]/1; MG/1; [IU]/1; MG/1; MG/1; MG/1; MG/1; MG/1; MG/1; UG/1; MG/1; MG/1; MG/1; MG/1
1 TABLET ORAL DAILY
Qty: 90 EACH | Refills: 2 | Status: SHIPPED | OUTPATIENT
Start: 2022-09-20 | End: 2022-12-05 | Stop reason: SDUPTHER

## 2022-09-20 RX ORDER — BUSPIRONE HYDROCHLORIDE 5 MG/1
TABLET ORAL
Qty: 90 TABLET | Refills: 1 | Status: SHIPPED | OUTPATIENT
Start: 2022-09-20 | End: 2022-12-05

## 2022-09-20 RX ORDER — LORATADINE 10 MG/1
10 TABLET ORAL DAILY
Qty: 30 TABLET | Refills: 5 | Status: SHIPPED | OUTPATIENT
Start: 2022-09-20

## 2022-09-20 NOTE — PROGRESS NOTES
Chief Complaint   Patient presents with   • Follow-up   • Diabetes       HPI  Yasmin Singleton is a 37 y.o. female presents for follow-up on diabetes.  Her last A1c in March was 6.4. She does not watch her diet.  She states that she has gained weight.  Feels like she has BV.  Was treated with Flagyl and Diflucan in March for BV per her gynecologist.  Her symptoms did not improve but she did not notify her gynecologist.  Has had a runny nose for several months.  Has not tried any allergy medicine  Complains of mid lower back pain that radiates to her left hip.  This has been intermittent pain for about a year.  No injury or trauma.  The pain is frequent.  Usually hurts every am when she wakes up.  No relief with OTC meds.   Pt complains of social anxiety.  She states that she has had it for years.  She has never been treated for it.  She would like a referral to speak to someone about it.  She states that the anxiety holds her back at times.  She feels like she has mild depression with it.    Complains of chronic right breast pain.  This occurs at random times.   There is no specific area that hurts. No family history of breast cancer.       The following portions of the patient's history were reviewed and updated as appropriate: allergies, current medications, past family history, past medical history, past social history, past surgical history and problem list.    Subjective  Review of Systems   Constitutional: Negative for activity change, appetite change and fatigue.   HENT: Negative for congestion.    Respiratory: Negative for cough and shortness of breath.    Cardiovascular: Negative for chest pain and leg swelling.   Gastrointestinal: Negative for abdominal pain.   Genitourinary: Positive for breast pain. Negative for breast discharge and breast lump.   Neurological: Negative for dizziness, weakness and confusion.   Psychiatric/Behavioral: Positive for depressed mood (very mild). Negative for agitation,  "behavioral problems, decreased concentration and sleep disturbance. The patient is nervous/anxious.        Objective  Visit Vitals  /64 (BP Location: Left arm, Patient Position: Sitting)   Pulse 81   Temp 97.6 °F (36.4 °C)   Ht 160 cm (63\")   Wt 83.5 kg (184 lb)   SpO2 99%   BMI 32.59 kg/m²        Physical Exam  Vitals and nursing note reviewed.   HENT:      Head: Normocephalic.      Ears:      Comments: Both TMs dull, mild fluid behind R TM  Eyes:      Pupils: Pupils are equal, round, and reactive to light.   Cardiovascular:      Rate and Rhythm: Normal rate and regular rhythm.      Pulses: Normal pulses.      Heart sounds: Normal heart sounds.   Pulmonary:      Effort: Pulmonary effort is normal.      Breath sounds: Normal breath sounds.   Musculoskeletal:      Lumbar back: No tenderness. Normal range of motion.      Comments: Pain in lumbar spine with forward flexion   Skin:     General: Skin is warm and dry.      Capillary Refill: Capillary refill takes less than 2 seconds.   Neurological:      General: No focal deficit present.      Mental Status: She is alert and oriented to person, place, and time.      Gait: Gait is intact.   Psychiatric:         Attention and Perception: Attention normal.         Mood and Affect: Mood normal.         Behavior: Behavior normal.         Cognition and Memory: Cognition and memory normal.         Judgment: Judgment normal.          Procedures       Results for orders placed or performed in visit on 09/20/22   POC Glycosylated Hemoglobin (Hb A1C)    Specimen: Blood   Result Value Ref Range    Hemoglobin A1C 6.4 %    Lot Number [id number]     Expiration Date 06/14/2024            Assessment and Plan  Diagnoses and all orders for this visit:    1. Type 2 diabetes mellitus without complication, without long-term current use of insulin (HCC) (Primary)  -     POC Glycosylated Hemoglobin (Hb A1C)    2. Runny nose  -     loratadine (CLARITIN) 10 MG tablet; Take 1 tablet by mouth " Daily.  Dispense: 30 tablet; Refill: 5    3. Generalized anxiety disorder  -     busPIRone (BUSPAR) 5 MG tablet; Take 1 tablet PO 2-3 times a day  Dispense: 90 tablet; Refill: 1    4. Breast pain, right  -     US breast right complete; Future    5. Chronic midline low back pain without sciatica  -     XR Spine Lumbar 2 or 3 View; Future    6. Other specified hypothyroidism  -     TSH; Future  -     T3, Free  -     T4, free; Future  -     Thyroid Antibodies  -     CBC w AUTO Differential; Future  -     Comprehensive Metabolic Panel  -     TSH  -     T4, free  -     CBC w AUTO Differential    7. Vitamin D deficiency  -     Vitamin D 25 hydroxy; Future  -     Vitamin D 25 hydroxy    8. Need for influenza vaccination  -     Fluzone Quad >6 mos (Multi-dose) ([id number])    Other orders  -     Prenatal Vit-Fe Fumarate-FA (Prenatal 27-1) 27-1 MG tablet tablet; Take 1 tablet by mouth Daily.  Dispense: 90 each; Refill: 2    A1c 6.4, cont Metformin  Claritin for runny nose  Start Buspar for anxiety  XR L-spine  Flu vax today  Schedule U/S of breast for chronic pain    Return in about 4 weeks (around 10/18/2022) for Recheck Anxiety.        JAIMIE Roland

## 2022-09-22 DIAGNOSIS — E55.9 VITAMIN D DEFICIENCY: Primary | ICD-10-CM

## 2022-09-22 LAB
25(OH)D3+25(OH)D2 SERPL-MCNC: 17 NG/ML (ref 30–100)
ALBUMIN SERPL-MCNC: 4.4 G/DL (ref 3.8–4.8)
ALBUMIN/GLOB SERPL: 1.9 {RATIO} (ref 1.2–2.2)
ALP SERPL-CCNC: 69 IU/L (ref 44–121)
ALT SERPL-CCNC: 21 IU/L (ref 0–32)
AST SERPL-CCNC: 18 IU/L (ref 0–40)
BASOPHILS # BLD AUTO: 0 X10E3/UL (ref 0–0.2)
BASOPHILS NFR BLD AUTO: 1 %
BILIRUB SERPL-MCNC: 0.2 MG/DL (ref 0–1.2)
BUN SERPL-MCNC: 7 MG/DL (ref 6–20)
BUN/CREAT SERPL: 11 (ref 9–23)
CALCIUM SERPL-MCNC: 9.3 MG/DL (ref 8.7–10.2)
CHLORIDE SERPL-SCNC: 106 MMOL/L (ref 96–106)
CO2 SERPL-SCNC: 21 MMOL/L (ref 20–29)
CREAT SERPL-MCNC: 0.63 MG/DL (ref 0.57–1)
EGFRCR SERPLBLD CKD-EPI 2021: 118 ML/MIN/1.73
EOSINOPHIL # BLD AUTO: 0.2 X10E3/UL (ref 0–0.4)
EOSINOPHIL NFR BLD AUTO: 3 %
ERYTHROCYTE [DISTWIDTH] IN BLOOD BY AUTOMATED COUNT: 14.5 % (ref 11.7–15.4)
GLOBULIN SER CALC-MCNC: 2.3 G/DL (ref 1.5–4.5)
GLUCOSE SERPL-MCNC: 120 MG/DL (ref 65–99)
HCT VFR BLD AUTO: 41.9 % (ref 34–46.6)
HGB BLD-MCNC: 13.9 G/DL (ref 11.1–15.9)
IMM GRANULOCYTES # BLD AUTO: 0 X10E3/UL (ref 0–0.1)
IMM GRANULOCYTES NFR BLD AUTO: 1 %
LYMPHOCYTES # BLD AUTO: 3.3 X10E3/UL (ref 0.7–3.1)
LYMPHOCYTES NFR BLD AUTO: 42 %
MCH RBC QN AUTO: 29.6 PG (ref 26.6–33)
MCHC RBC AUTO-ENTMCNC: 33.2 G/DL (ref 31.5–35.7)
MCV RBC AUTO: 89 FL (ref 79–97)
MONOCYTES # BLD AUTO: 0.5 X10E3/UL (ref 0.1–0.9)
MONOCYTES NFR BLD AUTO: 6 %
NEUTROPHILS # BLD AUTO: 3.8 X10E3/UL (ref 1.4–7)
NEUTROPHILS NFR BLD AUTO: 47 %
PLATELET # BLD AUTO: 227 X10E3/UL (ref 150–450)
POTASSIUM SERPL-SCNC: 4.1 MMOL/L (ref 3.5–5.2)
PROT SERPL-MCNC: 6.7 G/DL (ref 6–8.5)
RBC # BLD AUTO: 4.7 X10E6/UL (ref 3.77–5.28)
SODIUM SERPL-SCNC: 143 MMOL/L (ref 134–144)
T3FREE SERPL-MCNC: 3 PG/ML (ref 2–4.4)
T4 FREE SERPL-MCNC: 1.09 NG/DL (ref 0.82–1.77)
THYROGLOB AB SERPL-ACNC: <1 IU/ML (ref 0–0.9)
THYROPEROXIDASE AB SERPL-ACNC: 9 IU/ML (ref 0–34)
TSH SERPL DL<=0.005 MIU/L-ACNC: 3.04 UIU/ML (ref 0.45–4.5)
WBC # BLD AUTO: 7.9 X10E3/UL (ref 3.4–10.8)

## 2022-09-22 RX ORDER — ERGOCALCIFEROL 1.25 MG/1
50000 CAPSULE ORAL WEEKLY
Qty: 4 CAPSULE | Refills: 11 | Status: SHIPPED | OUTPATIENT
Start: 2022-09-22

## 2022-09-28 ENCOUNTER — TELEPHONE (OUTPATIENT)
Dept: INTERNAL MEDICINE | Facility: CLINIC | Age: 37
End: 2022-09-28

## 2022-09-28 ENCOUNTER — HOSPITAL ENCOUNTER (OUTPATIENT)
Dept: GENERAL RADIOLOGY | Facility: HOSPITAL | Age: 37
Discharge: HOME | End: 2022-09-28
Admitting: NURSE PRACTITIONER

## 2022-09-28 DIAGNOSIS — G89.29 CHRONIC MIDLINE LOW BACK PAIN WITHOUT SCIATICA: ICD-10-CM

## 2022-09-28 DIAGNOSIS — N64.4 BREAST PAIN, RIGHT: Primary | ICD-10-CM

## 2022-09-28 DIAGNOSIS — M54.50 CHRONIC MIDLINE LOW BACK PAIN WITHOUT SCIATICA: ICD-10-CM

## 2022-09-28 PROCEDURE — 72100 X-RAY EXAM L-S SPINE 2/3 VWS: CPT

## 2022-09-28 NOTE — TELEPHONE ENCOUNTER
Pt will be called with appt.  I had to order a diagnostic mammogram so she will get that done with the ultrasound

## 2022-09-28 NOTE — TELEPHONE ENCOUNTER
Caller: Yasmin Singleton    Relationship: Self    Best call back number: [phone]    What was the call regarding:   PATIENT WOULD LIKE A CALL BACK REGARDING HER ORDER FOR A ULTRASOUNDS OF THE RIGHT BREAST AND GETTING A APPOINTMENT SCHEDULED TO HAVE DONE     Do you require a callback: YES

## 2022-10-04 ENCOUNTER — TELEPHONE (OUTPATIENT)
Dept: INTERNAL MEDICINE | Facility: CLINIC | Age: 37
End: 2022-10-04

## 2022-10-04 NOTE — TELEPHONE ENCOUNTER
Caller: Yasmin Singleton    Relationship: Self    Best call back number: [phone]     Caller requesting test results: SELF     What test was performed: XRAY ON BACK    When was the test performed: LAST WEEK    Where was the test performed: [facility]    Additional notes: PATIENT ALSO STATED THAT SHE CALLED ABOUT GETTING A BREAST XRAY.  PATIENT HASN'T HEARD FROM ANYONE ABOUT THIS REQUEST ALSO.

## 2022-10-04 NOTE — TELEPHONE ENCOUNTER
"CENTRAL SCHEDULING STATED \"PT is above 30 and will need a diagnostic mammogram to go along with the US per policy.\" PLEASE ADVISE.  "

## 2022-10-10 NOTE — TELEPHONE ENCOUNTER
PT CALLED THE OFFICE WANTING HER XRAY RESULTS,SHE SAID THIS IS THE 3RD TIME CALLING FOR IT, PLEASE ADVISE.

## 2022-10-12 ENCOUNTER — TELEPHONE (OUTPATIENT)
Dept: INTERNAL MEDICINE | Facility: CLINIC | Age: 37
End: 2022-10-12

## 2022-10-12 DIAGNOSIS — N64.4 BREAST PAIN, RIGHT: Primary | ICD-10-CM

## 2022-10-12 NOTE — TELEPHONE ENCOUNTER
"Caleb, so sorry but Central scheduling sent me a message back stating to \"PLEASE INCLUDE TOMOSYNTHESIS WITH ORDER AND SEND BACK TO [facility].  THANK YOU\" on the pts mammogram referral.  "

## 2022-11-07 ENCOUNTER — CLINICAL SUPPORT (OUTPATIENT)
Dept: INTERNAL MEDICINE | Facility: CLINIC | Age: 37
End: 2022-11-07

## 2022-11-07 DIAGNOSIS — Z11.1 VISIT FOR TB SKIN TEST: Primary | ICD-10-CM

## 2022-11-07 PROCEDURE — 86580 TB INTRADERMAL TEST: CPT | Performed by: NURSE PRACTITIONER

## 2022-11-10 ENCOUNTER — TELEPHONE (OUTPATIENT)
Dept: INTERNAL MEDICINE | Facility: CLINIC | Age: 37
End: 2022-11-10

## 2022-11-10 LAB
INDURATION: 0 MM (ref 0–10)
Lab: NORMAL
Lab: NORMAL
TB SKIN TEST: NEGATIVE

## 2022-11-10 NOTE — TELEPHONE ENCOUNTER
Caller: Yasmin Singleton    Relationship: Self    Best call back number: [phone]    Caller requesting test results: PATIENT    What test was performed: TB SKIN TEST    When was the test performed: MONDAY    Where was the test performed: OFFICE

## 2022-11-16 ENCOUNTER — HOSPITAL ENCOUNTER (OUTPATIENT)
Dept: MAMMOGRAPHY | Facility: HOSPITAL | Age: 37
Discharge: HOME | End: 2022-11-16

## 2022-11-16 ENCOUNTER — HOSPITAL ENCOUNTER (OUTPATIENT)
Dept: ULTRASOUND IMAGING | Facility: HOSPITAL | Age: 37
Discharge: HOME | End: 2022-11-16

## 2022-11-16 DIAGNOSIS — N64.4 BREAST PAIN, RIGHT: ICD-10-CM

## 2022-11-16 PROCEDURE — 76642 ULTRASOUND BREAST LIMITED: CPT

## 2022-11-16 PROCEDURE — 77062 BREAST TOMOSYNTHESIS BI: CPT | Performed by: RADIOLOGY

## 2022-11-16 PROCEDURE — G0279 TOMOSYNTHESIS, MAMMO: HCPCS

## 2022-11-16 PROCEDURE — 77066 DX MAMMO INCL CAD BI: CPT

## 2022-11-16 PROCEDURE — 77066 DX MAMMO INCL CAD BI: CPT | Performed by: RADIOLOGY

## 2022-11-16 PROCEDURE — 76642 ULTRASOUND BREAST LIMITED: CPT | Performed by: RADIOLOGY

## 2022-11-29 ENCOUNTER — OFFICE VISIT (OUTPATIENT)
Dept: OBSTETRICS AND GYNECOLOGY | Facility: CLINIC | Age: 37
End: 2022-11-29

## 2022-11-29 VITALS
WEIGHT: 207 LBS | BODY MASS INDEX: 36.67 KG/M2 | DIASTOLIC BLOOD PRESSURE: 80 MMHG | RESPIRATION RATE: 16 BRPM | SYSTOLIC BLOOD PRESSURE: 128 MMHG

## 2022-11-29 DIAGNOSIS — B96.89 BV (BACTERIAL VAGINOSIS): Primary | ICD-10-CM

## 2022-11-29 DIAGNOSIS — N76.1 SUBACUTE VAGINITIS: ICD-10-CM

## 2022-11-29 DIAGNOSIS — N76.0 BV (BACTERIAL VAGINOSIS): Primary | ICD-10-CM

## 2022-11-29 DIAGNOSIS — B37.31 YEAST VAGINITIS: ICD-10-CM

## 2022-11-29 PROCEDURE — 99213 OFFICE O/P EST LOW 20 MIN: CPT | Performed by: NURSE PRACTITIONER

## 2022-11-29 PROCEDURE — 87210 SMEAR WET MOUNT SALINE/INK: CPT | Performed by: NURSE PRACTITIONER

## 2022-11-29 RX ORDER — METRONIDAZOLE 500 MG/1
500 TABLET ORAL 2 TIMES DAILY
Qty: 14 TABLET | Refills: 0 | Status: SHIPPED | OUTPATIENT
Start: 2022-11-29 | End: 2022-12-05

## 2022-11-29 RX ORDER — FAMOTIDINE 20 MG/1
TABLET, FILM COATED ORAL
COMMUNITY
Start: 2022-09-20 | End: 2022-12-19

## 2022-11-29 RX ORDER — FLUCONAZOLE 150 MG/1
TABLET ORAL
Qty: 4 TABLET | Refills: 0 | Status: SHIPPED | OUTPATIENT
Start: 2022-11-29 | End: 2022-12-05

## 2022-11-29 NOTE — PROGRESS NOTES
Problem Visit     Patient Name: Yasmni Singleton  : 1985   MRN: [id number]   Care Team: Patient Care Team:  Caleb Vivar APRN as PCP - General (Family Medicine)  Caroline Wesley MD as Consulting Physician (Cardiology)  Irena Holcomb APRN as Nurse Practitioner (Nurse Practitioner)    Chief Complaint:    Chief Complaint   Patient presents with   • Vaginal Discharge     With odor       HPI: Yasmin Singleton is a 37 y.o. year old  presenting to be seen with c/o vaginal d/c with odor.   Treated for yeast and BV in 2022 at Annual visit   Given flagyl and diflucan at that time   States she doesn't feel like sx improved at all with txment   She has been taking daily probiotics     Faithful condom use for contraception     Hx PCOS - periods q 5-6 wks   LMP 22     Denies pelvic pain, dyspareunia, or postcoital bldg   No concern about possible STIs       Subjective      I have reviewed the patients family history, social history, past medical history, past surgical history and have updated it as appropriate.    /80   Resp 16   Wt 93.9 kg (207 lb)   LMP 2022   BMI 36.67 kg/m²     BMI reviewed: Body mass index is 36.67 kg/m².      Objective     Physical Exam      Neuro: alert and oriented to person, place and time   General:  alert; cooperative; well developed; well nourished   Skin:  Not performed.   Thyroid: not examined   Lungs:  breathing is unlabored   Heart:  Not performed.   Breasts:  Not performed.   Abdomen: Not performed.   Pelvis: Clinical staff was present for exam  External genitalia:  normal appearance of the external genitalia including Bartholin's and [place]'s glands.  :  urethral meatus normal;  Vaginal:  discharge present -  white; wet prep done: clue cells are present and pseudo-hyphae are present;  Cervix:  normal appearance.  Uterus:  normal size, shape and consistency.  Adnexa:  normal bimanual exam of the adnexa.  Rectal:  digital rectal exam not  performed; anus visually normal appearing.         Assessment / Plan      Assessment  Problems Addressed This Visit    ICD-10-CM ICD-9-CM   1. BV (bacterial vaginosis)  N76.0 616.10    B96.89 041.9   2. Yeast vaginitis  B37.31 112.1   3. Subacute vaginitis  N76.1 616.10       Plan    Wet mount = yeast and BV   Discussed txment and prevention measures   Cont with daily probiotics   No intercourse during txment   One swab pending for aerobic and BV panels   Will call with results     Desires to cont with faithful condom use for contraception     AV due March 2023           Follow Up  Return for Next scheduled follow up.  Patient was given instructions and counseling regarding her condition or for health maintenance advice. Please see specific information pulled into the AVS if appropriate.     Irena Holcomb, APRN  November 29, 2022  09:39 EST

## 2022-12-05 ENCOUNTER — OFFICE VISIT (OUTPATIENT)
Dept: INTERNAL MEDICINE | Facility: CLINIC | Age: 37
End: 2022-12-05

## 2022-12-05 VITALS
WEIGHT: 201 LBS | TEMPERATURE: 97.5 F | OXYGEN SATURATION: 99 % | SYSTOLIC BLOOD PRESSURE: 120 MMHG | HEIGHT: 63 IN | HEART RATE: 100 BPM | DIASTOLIC BLOOD PRESSURE: 78 MMHG | BODY MASS INDEX: 35.61 KG/M2

## 2022-12-05 DIAGNOSIS — E11.9 TYPE 2 DIABETES MELLITUS WITHOUT COMPLICATION, WITHOUT LONG-TERM CURRENT USE OF INSULIN: Primary | ICD-10-CM

## 2022-12-05 DIAGNOSIS — M54.50 CHRONIC LEFT-SIDED LOW BACK PAIN WITHOUT SCIATICA: ICD-10-CM

## 2022-12-05 DIAGNOSIS — G89.29 CHRONIC LEFT-SIDED LOW BACK PAIN WITHOUT SCIATICA: ICD-10-CM

## 2022-12-05 DIAGNOSIS — F41.1 GENERALIZED ANXIETY DISORDER: ICD-10-CM

## 2022-12-05 LAB
EXPIRATION DATE: NORMAL
HBA1C MFR BLD: 7.1 %
Lab: NORMAL

## 2022-12-05 PROCEDURE — 99214 OFFICE O/P EST MOD 30 MIN: CPT | Performed by: NURSE PRACTITIONER

## 2022-12-05 RX ORDER — PRENATAL WITH FERROUS FUM AND FOLIC ACID 3080; 920; 120; 400; 22; 1.84; 3; 20; 10; 1; 12; 200; 27; 25; 2 [IU]/1; [IU]/1; MG/1; [IU]/1; MG/1; MG/1; MG/1; MG/1; MG/1; MG/1; UG/1; MG/1; MG/1; MG/1; MG/1
1 TABLET ORAL DAILY
Qty: 90 EACH | Refills: 2 | Status: SHIPPED | OUTPATIENT
Start: 2022-12-05

## 2022-12-05 RX ORDER — LANCETS 28 GAUGE
1 EACH MISCELLANEOUS DAILY
Qty: 100 EACH | Refills: 11 | Status: SHIPPED | OUTPATIENT
Start: 2022-12-05

## 2022-12-05 RX ORDER — CLINDAMYCIN PHOSPHATE 20 MG/G
1 CREAM VAGINAL NIGHTLY
Qty: 40 G | Refills: 0 | Status: SHIPPED | OUTPATIENT
Start: 2022-12-05 | End: 2022-12-05

## 2022-12-05 RX ORDER — BUSPIRONE HYDROCHLORIDE 10 MG/1
TABLET ORAL
Qty: 90 TABLET | Refills: 3 | Status: SHIPPED | OUTPATIENT
Start: 2022-12-05

## 2022-12-05 RX ORDER — MELOXICAM 7.5 MG/1
TABLET ORAL
Qty: 60 TABLET | Refills: 3 | Status: SHIPPED | OUTPATIENT
Start: 2022-12-05

## 2022-12-05 NOTE — PROGRESS NOTES
"Chief Complaint   Patient presents with   • Follow-up   • Diabetes       HPI  Yasmin Singleton is a 37 y.o. female presents for follow-up on diabetes and Vit D deficiency.  Her last A1c in September was 6.4. Her current treatment includes Metformin.  She hasn't been monitoring her diet very well.  She takes her Metformin as prescribed.    Her low back continues to bother her.  She had an Xray in September.  The pain continues to get worse.  The pain does not radiate down her legs.  Anxiety is some better with Buspar.  She would like to have the dose increased.    The following portions of the patient's history were reviewed and updated as appropriate: allergies, current medications, past family history, past medical history, past social history, past surgical history and problem list.    Subjective  Review of Systems   Constitutional: Negative for activity change, appetite change and fatigue.   HENT: Negative for congestion.    Respiratory: Negative for cough and shortness of breath.    Cardiovascular: Negative for chest pain and leg swelling.   Gastrointestinal: Negative for abdominal pain.   Musculoskeletal: Positive for arthralgias and back pain.   Neurological: Negative for dizziness, weakness and confusion.   Psychiatric/Behavioral: Negative for behavioral problems, decreased concentration and depressed mood. The patient is nervous/anxious.        Objective  Visit Vitals  /78 (BP Location: Left arm, Patient Position: Sitting)   Pulse 100   Temp 97.5 °F (36.4 °C)   Ht 160 cm (63\")   Wt 91.2 kg (201 lb)   LMP 11/05/2022   SpO2 99%   BMI 35.61 kg/m²        Physical Exam  Vitals and nursing note reviewed.   Constitutional:       Appearance: She is obese.   HENT:      Head: Normocephalic.   Eyes:      Pupils: Pupils are equal, round, and reactive to light.   Cardiovascular:      Rate and Rhythm: Normal rate and regular rhythm.      Pulses: Normal pulses.      Heart sounds: Normal heart sounds.   Pulmonary:      " Effort: Pulmonary effort is normal.      Breath sounds: Normal breath sounds.   Musculoskeletal:        Back:    Skin:     General: Skin is warm and dry.      Capillary Refill: Capillary refill takes less than 2 seconds.   Neurological:      General: No focal deficit present.      Mental Status: She is alert and oriented to person, place, and time.      Gait: Gait is intact.   Psychiatric:         Attention and Perception: Attention normal.         Mood and Affect: Mood normal.         Behavior: Behavior normal.         Thought Content: Thought content normal.          Procedures       Results for orders placed or performed in visit on 12/05/22   POC Glycosylated Hemoglobin (Hb A1C)    Specimen: Blood   Result Value Ref Range    Hemoglobin A1C 7.1 %    Lot Number [id number]     Expiration Date 02/08/2024          Assessment and Plan  Diagnoses and all orders for this visit:    1. Type 2 diabetes mellitus without complication, without long-term current use of insulin (HCC) (Primary)  -     POC Glycosylated Hemoglobin (Hb A1C)  -     Lancets (freestyle) lancets; 1 each by Other route Daily. Use as instructed  Dispense: 100 each; Refill: 11  -     glucose blood test strip; 1 each by Other route 2 (Two) Times a Day With Meals. Use as instructed  Dispense: 60 each; Refill: 11    2. Generalized anxiety disorder  -     busPIRone (BUSPAR) 10 MG tablet; Take 1 tablet PO 2-3 times a day  Dispense: 90 tablet; Refill: 3    3. Chronic left-sided low back pain without sciatica  -     Cancel: Ambulatory Referral to Physical Therapy Evaluate and treat  -     meloxicam (Mobic) 7.5 MG tablet; Take 1-2 tablets PO daily  Dispense: 60 tablet; Refill: 3    Other orders  -     Prenatal Vit-Fe Fumarate-FA (Prenatal 27-1) 27-1 MG tablet tablet; Take 1 tablet by mouth Daily.  Dispense: 90 each; Refill: 2    A1c increased to 7.1  Pt states that she will monitor her diet better and get the A1c back down, cont Metformin  Increase Buspar to  10mg  Try Meloxicam for back pain, may need PT/MRI in the near future    Return in about 6 weeks (around 1/16/2023) for Recheck Sil[place] Pain.        Caleb Vivar, APRN

## 2022-12-06 ENCOUNTER — TELEPHONE (OUTPATIENT)
Dept: OBSTETRICS AND GYNECOLOGY | Facility: CLINIC | Age: 37
End: 2022-12-06

## 2022-12-06 ENCOUNTER — TELEPHONE (OUTPATIENT)
Dept: INTERNAL MEDICINE | Facility: CLINIC | Age: 37
End: 2022-12-06

## 2022-12-06 NOTE — TELEPHONE ENCOUNTER
Vivi,  Please let her know that her insurance denied the clindamycin vaginal cream, so I sent in a script for clindamaycin vaginal ovules that she will use for 3 days.  If after finishing all of the medications her sx do not resolve, let us know.     Thanks!

## 2022-12-06 NOTE — TELEPHONE ENCOUNTER
Caller: Yasmin Singleton    Relationship: Self    Best call back number: [phone]    What medication are you requesting: NAME ON TEST STRIPS AND LANCETS   FREESTYLE LIGHT IS THE CORRECT ONE.    Have you had these symptoms before:    [x] Yes  [] No    Have you been treated for these symptoms before:   [x] Yes  [] No    If a prescription is needed, what is your preferred pharmacy and phone number: [facility] [id number] - [place] [address] [address] AT [facility] [address]  - [phone]  - [phone] FX     Additional notes:  PATIENT WAS SEEN YESTERDAY 12/05/22 AND TOLD PROVIDER THE WRONG NAME.

## 2022-12-07 ENCOUNTER — TELEPHONE (OUTPATIENT)
Dept: OBSTETRICS AND GYNECOLOGY | Facility: CLINIC | Age: 37
End: 2022-12-07

## 2022-12-09 RX ORDER — CLINDAMYCIN PHOSPHATE 20 MG/G
1 CREAM VAGINAL NIGHTLY
Qty: 40 G | Refills: 0 | Status: SHIPPED | OUTPATIENT
Start: 2022-12-09 | End: 2022-12-12 | Stop reason: ALTCHOICE

## 2022-12-12 ENCOUNTER — TELEPHONE (OUTPATIENT)
Dept: OBSTETRICS AND GYNECOLOGY | Facility: CLINIC | Age: 37
End: 2022-12-12

## 2022-12-12 RX ORDER — CLINDAMYCIN PHOSPHATE 100 MG/5G
CREAM VAGINAL
Qty: 5 G | Refills: 0 | Status: SHIPPED | OUTPATIENT
Start: 2022-12-12

## 2022-12-12 NOTE — TELEPHONE ENCOUNTER
Vivi,  Please let her know that I sent in Clindesse, that is what her insurance will cover.     Thanks!

## 2022-12-12 NOTE — TELEPHONE ENCOUNTER
Insurance denied clindamycin cream and the suppositories are on back order.  Can you send in clindess.

## 2022-12-19 RX ORDER — FAMOTIDINE 20 MG/1
TABLET, FILM COATED ORAL
Qty: 180 TABLET | Refills: 2 | Status: SHIPPED | OUTPATIENT
Start: 2022-12-19

## 2023-04-13 ENCOUNTER — OFFICE VISIT (OUTPATIENT)
Dept: INTERNAL MEDICINE | Facility: CLINIC | Age: 38
End: 2023-04-13
Payer: MEDICAID

## 2023-04-13 ENCOUNTER — LAB (OUTPATIENT)
Dept: LAB | Facility: HOSPITAL | Age: 38
End: 2023-04-13
Payer: MEDICAID

## 2023-04-13 VITALS
HEART RATE: 78 BPM | OXYGEN SATURATION: 99 % | TEMPERATURE: 98.1 F | DIASTOLIC BLOOD PRESSURE: 80 MMHG | SYSTOLIC BLOOD PRESSURE: 120 MMHG | HEIGHT: 63 IN | WEIGHT: 200.3 LBS | BODY MASS INDEX: 35.49 KG/M2

## 2023-04-13 DIAGNOSIS — F41.1 GENERALIZED ANXIETY DISORDER: ICD-10-CM

## 2023-04-13 DIAGNOSIS — G89.29 CHRONIC RIGHT SHOULDER PAIN: Primary | ICD-10-CM

## 2023-04-13 DIAGNOSIS — E03.8 OTHER SPECIFIED HYPOTHYROIDISM: ICD-10-CM

## 2023-04-13 DIAGNOSIS — M25.511 CHRONIC RIGHT SHOULDER PAIN: Primary | ICD-10-CM

## 2023-04-13 DIAGNOSIS — K21.9 GASTROESOPHAGEAL REFLUX DISEASE WITHOUT ESOPHAGITIS: ICD-10-CM

## 2023-04-13 DIAGNOSIS — E11.65 TYPE 2 DIABETES MELLITUS WITH HYPERGLYCEMIA, WITHOUT LONG-TERM CURRENT USE OF INSULIN: ICD-10-CM

## 2023-04-13 LAB
ALBUMIN UR-MCNC: 3.5 MG/DL
EXPIRATION DATE: NORMAL
HBA1C MFR BLD: 6.9 %
Lab: NORMAL

## 2023-04-13 PROCEDURE — 82043 UR ALBUMIN QUANTITATIVE: CPT | Performed by: NURSE PRACTITIONER

## 2023-04-13 RX ORDER — LEVOTHYROXINE SODIUM 0.1 MG/1
TABLET ORAL
Qty: 30 TABLET | Refills: 5 | Status: SHIPPED | OUTPATIENT
Start: 2023-04-13

## 2023-04-13 NOTE — PROGRESS NOTES
"Chief Complaint   Patient presents with   • Shoulder Pain       HPI  Yasmin Singleton is a 37 y.o. female presents with right shoulder pain.  This has been a problem for over 2 years.  Pt saw Dr Forest He in 2021 and diagnosed with frozen shoulder.  She did several rounds of PT.  The pain is more prominent when laying in bed or laying on her arms.  She denies any trauma.  Dr He documented in his note that if the s/s persisted she may need an MRI.      The following portions of the patient's history were reviewed and updated as appropriate: allergies, current medications, past family history, past medical history, past social history, past surgical history and problem list.    Subjective  Review of Systems   Constitutional: Negative for activity change, appetite change and fatigue.   HENT: Negative for congestion.    Respiratory: Negative for cough and shortness of breath.    Cardiovascular: Negative for chest pain and leg swelling.   Gastrointestinal: Negative for abdominal pain.   Musculoskeletal: Positive for arthralgias.   Neurological: Negative for dizziness, weakness and confusion.   Psychiatric/Behavioral: Negative for behavioral problems and decreased concentration.       Objective  Visit Vitals  /80 (BP Location: Left arm, Patient Position: Sitting)   Pulse 78   Temp 98.1 °F (36.7 °C)   Ht 160 cm (63\")   Wt 90.9 kg (200 lb 4.8 oz)   SpO2 99%   BMI 35.48 kg/m²        Physical Exam  Vitals and nursing note reviewed.   HENT:      Head: Normocephalic.   Eyes:      Pupils: Pupils are equal, round, and reactive to light.   Cardiovascular:      Rate and Rhythm: Regular rhythm.   Pulmonary:      Effort: Pulmonary effort is normal.      Breath sounds: Normal breath sounds.   Musculoskeletal:      Right shoulder: No swelling or bony tenderness. Normal range of motion.        Arms:    Skin:     General: Skin is warm and dry.      Capillary Refill: Capillary refill takes less than 2 seconds.   Neurological:    "   General: No focal deficit present.      Mental Status: She is alert and oriented to person, place, and time.      Gait: Gait is intact.   Psychiatric:         Attention and Perception: Attention normal.         Mood and Affect: Mood normal.         Behavior: Behavior normal.          Procedures     Results for orders placed or performed in visit on 04/13/23   POC Glycosylated Hemoglobin (Hb A1C)    Specimen: Blood   Result Value Ref Range    Hemoglobin A1C 6.9 %    Lot Number [id number]     Expiration Date 03/25/2024          Assessment and Plan  Diagnoses and all orders for this visit:    1. Chronic right shoulder pain (Primary)  -     MRI Shoulder Right Without Contrast; Future    2. Type 2 diabetes mellitus with hyperglycemia, without long-term current use of insulin  -     POC Glycosylated Hemoglobin (Hb A1C)  -     MicroAlbumin, Urine, Random - Urine, Clean Catch; Future    3. Gastroesophageal reflux disease without esophagitis    4. Generalized anxiety disorder    Had PT of shoulder in 2021  Order MRI  A1c improved at 6.9, cont Metformin  Anxiety well controlled on Buspirone  Pepcid working well for reflux    Return in about 4 months (around 8/13/2023) for Annual.        JAIMIE Wiley

## 2023-04-26 ENCOUNTER — TELEPHONE (OUTPATIENT)
Dept: INTERNAL MEDICINE | Facility: CLINIC | Age: 38
End: 2023-04-26
Payer: MEDICAID

## 2023-04-26 DIAGNOSIS — E11.9 TYPE 2 DIABETES MELLITUS WITHOUT COMPLICATION, WITHOUT LONG-TERM CURRENT USE OF INSULIN: ICD-10-CM

## 2023-04-26 RX ORDER — LANCETS 28 GAUGE
1 EACH MISCELLANEOUS DAILY
Qty: 100 EACH | Refills: 11 | Status: SHIPPED | OUTPATIENT
Start: 2023-04-26

## 2023-04-26 NOTE — TELEPHONE ENCOUNTER
Pt called the office for her A1c results from 4/13/2023. I let the pt know her results were 6.9 and she said that was fine. Pt said she needs a refill of freestyle lite lancet and test stripes called into the [facility], please advise.

## 2023-04-28 ENCOUNTER — TELEPHONE (OUTPATIENT)
Dept: INTERNAL MEDICINE | Facility: CLINIC | Age: 38
End: 2023-04-28

## 2023-04-28 ENCOUNTER — TELEPHONE (OUTPATIENT)
Dept: INTERNAL MEDICINE | Facility: CLINIC | Age: 38
End: 2023-04-28
Payer: MEDICAID

## 2023-04-28 NOTE — TELEPHONE ENCOUNTER
April with [facility] called and stated the MRI was denied due to pt needing 6 weeks of PT first, please advise.

## 2023-04-28 NOTE — TELEPHONE ENCOUNTER
Caller: Yasmin Singleton    Relationship to patient: Self    Best call back number: [phone]    Patient is needing:  THE MRI WAS CANCELLED AND PT WAS ADVISED TO CONTACT PCP TO SEE WHAT TO DO.

## 2023-05-08 DIAGNOSIS — E11.9 TYPE 2 DIABETES MELLITUS WITHOUT COMPLICATION, WITHOUT LONG-TERM CURRENT USE OF INSULIN: ICD-10-CM

## 2023-05-08 RX ORDER — METFORMIN HYDROCHLORIDE 750 MG/1
TABLET, EXTENDED RELEASE ORAL
Qty: 90 TABLET | Refills: 1 | Status: SHIPPED | OUTPATIENT
Start: 2023-05-08

## 2023-05-15 ENCOUNTER — OFFICE VISIT (OUTPATIENT)
Dept: OBSTETRICS AND GYNECOLOGY | Facility: CLINIC | Age: 38
End: 2023-05-15
Payer: MEDICAID

## 2023-05-15 VITALS
DIASTOLIC BLOOD PRESSURE: 70 MMHG | HEIGHT: 63 IN | BODY MASS INDEX: 35.97 KG/M2 | WEIGHT: 203 LBS | SYSTOLIC BLOOD PRESSURE: 122 MMHG

## 2023-05-15 DIAGNOSIS — E28.2 PCOS (POLYCYSTIC OVARIAN SYNDROME): ICD-10-CM

## 2023-05-15 DIAGNOSIS — B37.31 YEAST VAGINITIS: ICD-10-CM

## 2023-05-15 DIAGNOSIS — B96.89 BV (BACTERIAL VAGINOSIS): ICD-10-CM

## 2023-05-15 DIAGNOSIS — Z01.411 ENCOUNTER FOR GYNECOLOGICAL EXAMINATION WITH ABNORMAL FINDING: Primary | ICD-10-CM

## 2023-05-15 DIAGNOSIS — N76.0 BV (BACTERIAL VAGINOSIS): ICD-10-CM

## 2023-05-15 DIAGNOSIS — Z30.09 ENCOUNTER FOR OTHER GENERAL COUNSELING OR ADVICE ON CONTRACEPTION: ICD-10-CM

## 2023-05-15 RX ORDER — METRONIDAZOLE 500 MG/1
500 TABLET ORAL 2 TIMES DAILY
Qty: 14 TABLET | Refills: 0 | Status: SHIPPED | OUTPATIENT
Start: 2023-05-15 | End: 2023-05-22

## 2023-05-15 RX ORDER — FLUCONAZOLE 150 MG/1
TABLET ORAL
Qty: 4 TABLET | Refills: 0 | Status: SHIPPED | OUTPATIENT
Start: 2023-05-15

## 2023-05-15 RX ORDER — SPIRONOLACTONE 50 MG/1
50 TABLET, FILM COATED ORAL DAILY
Qty: 30 TABLET | Refills: 11 | Status: SHIPPED | OUTPATIENT
Start: 2023-05-15

## 2023-05-15 NOTE — PROGRESS NOTES
"  Annual Visit     Patient Name: Yasmin Singleton  : 1985   MRN: [id number]   Care Team: Patient Care Team:  Caleb Vivar APRN as PCP - General (Family Medicine)  Caroline Wesley MD as Consulting Physician (Cardiology)  Irena Holcomb APRN as Nurse Practitioner (Nurse Practitioner)    Chief Complaint:    Chief Complaint   Patient presents with   • Annual Exam   • Vaginitis     Patient states that she feels that BV that was diagnosed at the time of the last office visit has never resolved.       HPI: Yasmin Singleton is a 37 y.o. year old  presenting to be seen for her gynecologic exam.   Pap smear 2020 WNL and HPV negative     Hx PCOS also DM 2 - fairly well controlled with metformin and diet   Periods q 5-6 wks but typically not 60 days or more between   LMP 5/3/23   Faithful condom use for contraception     Treated for yeast and BV in 2022 at Annual visit and again 2022   Given flagyl and diflucan both times   Also clindamycin vaginal cream - but didn't feel like it stayed in the vagina long enough to be effective   States she doesn't feel like sx improved at all with txment   She has been taking daily probiotics   Denies pelvic pain, dyspareunia, or postcoital bldg   Requests STI screening just to be sure     Hx hidradenitis suppurativa   Reports bothersome hirsutism - she would like to discuss mgmt options   States dermatology recommended electrolysis but that is very expensive     Hx hypothyroidism - takes medication and sees PCP for mgmt      Smoker     Bilateral dx mammogram done 2022 birads 2 - return to screening at age 41   Done with PCP d/t bilateral breast pain - still with some occasional breast tenderness   She reports high caffeine intake       Subjective      I have reviewed the patients family history, social history, past medical history, past surgical history and have updated it as appropriate.    /70   Ht 160 cm (63\")   Wt 92.1 kg (203 lb)   LMP " 05/03/2023 (Exact Date)   Breastfeeding No   BMI 35.96 kg/m²     BMI reviewed: Body mass index is 35.96 kg/m².      Objective     Physical Exam    Neuro: alert and oriented to person, place and time   General:  alert; cooperative; well developed; well nourished   Skin:  No suspicious lesions seen   Thyroid: normal to inspection and palpation   Lungs:  breathing is unlabored  clear to auscultation bilaterally   Heart:  regular rate and rhythm, S1, S2 normal, no murmur, click, rub or gallop  normal apical impulse   Breasts:  Examined in supine position  Symmetric without masses or skin dimpling  Nipples normal without inversion, lesions or discharge  There are no palpable axillary nodes  Fibrocystic changes are present both breasts without a discrete mass   Abdomen: soft, non-tender; no masses  no umbilical or inguinal hernias are present  no hepato-splenomegaly   Pelvis: Clinical staff was present for exam  External genitalia:  normal appearance of the external genitalia including Bartholin's and Arcola's glands.  :  urethral meatus normal;  Vaginal:  discharge present -  white; wet prep done: clue cells are present and pseudo-hyphae are present;  Cervix:  normal appearance.  Uterus:  normal size, shape and consistency.  Adnexa:  normal bimanual exam of the adnexa.  Rectal:  digital rectal exam not performed; anus visually normal appearing.         Assessment / Plan      Assessment  Problems Addressed This Visit    ICD-10-CM ICD-9-CM   1. Encounter for gynecological examination with abnormal finding  Z01.411 V72.31   2. Encounter for other general counseling or advice on contraception  Z30.09 V25.09   3. PCOS (polycystic ovarian syndrome)  E28.2 256.4   4. BV (bacterial vaginosis)  N76.0 616.10    B96.89 041.9   5. Yeast vaginitis  B37.31 112.1       Plan    Pap smear not indicated   Discussed monthly SBEs and fibrocystic breast changes   Recommend reduction of caffeine and massage vit e oil into breast tissue  daily for breast tenderness  Desires to cont with faithful condom use for contraception   If ever 60 days or more between periods she will call for provera - pt v/u   Wet  Mount = BV   Discussed txment and prevention measures  No intercourse during txment   Oneswab pending BV and STI panels at pt's request  Recommend Rephresh 2x/wk at bedtime   Flagyl and diflucan to pharmacy   Trial of spironolactone 50mg qd - discussed benefits in 3 months, max benefit in 6 months   Discussed avoidance of NSAIDs with medication     AV 1 yr          19 to 39: Counseling/Anticipatory Guidance Discussed: family planning/contraception, sexual behavior and STDs and breast cancer and self breast exams    Follow Up  Return in about 1 year (around 5/15/2024) for Annual physical.  Patient was given instructions and counseling regarding her condition or for health maintenance advice. Please see specific information pulled into the AVS if appropriate.     Irena Holcomb, APRN  May 15, 2023  15:08 EDT

## 2023-05-18 ENCOUNTER — TELEPHONE (OUTPATIENT)
Dept: OBSTETRICS AND GYNECOLOGY | Facility: CLINIC | Age: 38
End: 2023-05-18
Payer: MEDICAID

## 2023-05-18 NOTE — TELEPHONE ENCOUNTER
Caller: Yasmin Singleton    Relationship: Self    Best call back number: [phone]    What is the best time to reach you: ANY    Who are you requesting to speak with (clinical staff, provider,  specific staff member): NOT SURE    Do you know the name of the person who called: NO    What was the call regarding: LABS    Do you require a callback: YES

## 2023-05-18 NOTE — TELEPHONE ENCOUNTER
I have returned the patient's call and have discussed test results with her.  Positive for BV, prescriptions have been phoned in and she will take as directed.  A more detailed note is found attached to her test results.

## 2023-08-07 ENCOUNTER — TELEPHONE (OUTPATIENT)
Dept: INTERNAL MEDICINE | Facility: CLINIC | Age: 38
End: 2023-08-07
Payer: MEDICAID

## 2023-08-07 DIAGNOSIS — E11.9 TYPE 2 DIABETES MELLITUS WITHOUT COMPLICATION, WITHOUT LONG-TERM CURRENT USE OF INSULIN: ICD-10-CM

## 2023-08-07 RX ORDER — LANCETS 28 GAUGE
1 EACH MISCELLANEOUS DAILY
Qty: 100 EACH | Refills: 11 | Status: SHIPPED | OUTPATIENT
Start: 2023-08-07

## 2023-08-07 NOTE — TELEPHONE ENCOUNTER
Caller: Yasmin Singleton    Relationship: Self    Best call back number: [phone]    What medications are you currently taking:   Current Outpatient Medications on File Prior to Visit   Medication Sig Dispense Refill    Boric Acid suppository Suppository Insert 1 suppository into the vagina Every Night. 21 suppository 0    busPIRone (BUSPAR) 10 MG tablet Take 1 tablet PO 2-3 times a day 90 tablet 3    famotidine (PEPCID) 20 MG tablet TAKE ONE TABLET BY MOUTH TWICE A DAY AS NEEDED FOR HEARTBURN 180 tablet 2    glucose blood test strip 1 each by Other route 2 (Two) Times a Day With Meals. Use as instructed 60 each 11    Lancets (freestyle) lancets 1 each by Other route Daily. Use as instructed 100 each 11    levothyroxine (SYNTHROID, LEVOTHROID) 100 MCG tablet Take 1 tablet by mouth Daily.      loratadine (CLARITIN) 10 MG tablet Take 1 tablet by mouth Daily. 30 tablet 5    metFORMIN ER (GLUCOPHAGE-XR) 750 MG 24 hr tablet TAKE ONE TABLET BY MOUTH DAILY WITH BREAKFAST 90 tablet 1    Prenatal Vit-Fe Fumarate-FA (Prenatal 27-1) 27-1 MG tablet tablet Take 1 tablet by mouth Daily. 90 each 2    vitamin D (ERGOCALCIFEROL) 1.25 MG (64552 UT) capsule capsule Take 1 capsule by mouth 1 (One) Time Per Week. 4 capsule 11     No current facility-administered medications on file prior to visit.            Which medication are you concerned about: GLUCOSE BLOOD TEST STRIPS    Who prescribed you this medication: YASMINE MARTINEZ    What are your concerns: PATIENT HAS BEEN RECEIVING THE WRONG TEST STRIPS. PATIENT USES FREESTYLE LITE TEST STRIPS AND NEEDS SCRIPT SENT TO PHARMACY. PLEASE ADVISE

## 2023-08-23 NOTE — TELEPHONE ENCOUNTER
"Caller: Yasmin Singleton    Relationship: Self    Best call back number: [phone]    Requested Prescriptions:   FREESTYLE BLOOD GLUCOSE TEST STRIPS    Pharmacy where request should be sent:  [facility] [id number] - [address]  AT [facility] [address] - [phone] PH - [phone] FX     Last office visit with prescribing clinician: 4/13/2023   Last telemedicine visit with prescribing clinician: Visit date not found   Next office visit with prescribing clinician: Visit date not found     Additional details provided by patient: NOT THE \"    glucose blood test strip   \" ON HER CHART,M IT NEEDS OT BE FREESTYLE LITE ONLY    NO ONE TOUCH ULTRA, NO MORE    Does the patient have less than a 3 day supply:  [x] Yes  [] No    Would you like a call back once the refill request has been completed: [x] Yes [] No    If the office needs to give you a call back, can they leave a voicemail: [] Yes [x] No    Russ Aguirre, JULIEN   08/23/23 15:19 EDT   "

## 2023-10-26 DIAGNOSIS — E55.9 VITAMIN D DEFICIENCY: ICD-10-CM

## 2023-10-26 RX ORDER — ERGOCALCIFEROL 1.25 MG/1
50000 CAPSULE ORAL WEEKLY
Qty: 12 CAPSULE | Refills: 1 | Status: SHIPPED | OUTPATIENT
Start: 2023-10-26

## 2023-11-17 DIAGNOSIS — E11.9 TYPE 2 DIABETES MELLITUS WITHOUT COMPLICATION, WITHOUT LONG-TERM CURRENT USE OF INSULIN: ICD-10-CM

## 2023-11-17 RX ORDER — METFORMIN HYDROCHLORIDE 750 MG/1
TABLET, EXTENDED RELEASE ORAL
Qty: 90 TABLET | Refills: 1 | Status: SHIPPED | OUTPATIENT
Start: 2023-11-17

## 2023-12-04 DIAGNOSIS — E11.9 TYPE 2 DIABETES MELLITUS WITHOUT COMPLICATION, WITHOUT LONG-TERM CURRENT USE OF INSULIN: ICD-10-CM

## 2023-12-04 RX ORDER — LEVOTHYROXINE SODIUM 0.1 MG/1
100 TABLET ORAL DAILY
OUTPATIENT
Start: 2023-12-04

## 2023-12-04 RX ORDER — LANCETS 28 GAUGE
1 EACH MISCELLANEOUS DAILY
Qty: 100 EACH | Refills: 5 | Status: SHIPPED | OUTPATIENT
Start: 2023-12-04

## 2023-12-04 NOTE — TELEPHONE ENCOUNTER
Caller: Mani Yasmin A    Relationship: Self    Best call back number: [phone]     Requested Prescriptions:   Requested Prescriptions     Pending Prescriptions Disp Refills    levothyroxine (SYNTHROID, LEVOTHROID) 100 MCG tablet       Sig: Take 1 tablet by mouth Daily.    glucose blood test strip 60 each 11     Si each by Other route 2 (Two) Times a Day With Meals. Use as instructed    Lancets (freestyle) lancets 100 each 11     Si each by Other route Daily. Use as instructed        Pharmacy where request should be sent: [facility] [id number] [address]  AT [facility] [address] - [phone]  - [phone] FX     Last office visit with prescribing clinician: 2023   Last telemedicine visit with prescribing clinician: Visit date not found   Next office visit with prescribing clinician: 2023     Additional details provided by patient: PATIENT IS NEEDING A REFILL ON THESE    THE TEST STRIPS SPECIFICALLY NEED TO BE COMPATIBLE WITH THE FREESTYLE LITE        Does the patient have less than a 3 day supply:  [x] Yes  [] No    Would you like a call back once the refill request has been completed: [] Yes [x] No    If the office needs to give you a call back, can they leave a voicemail: [] Yes [x] No    Jo Ann Bee Rep   23 10:49 EST

## 2023-12-05 ENCOUNTER — OFFICE VISIT (OUTPATIENT)
Dept: INTERNAL MEDICINE | Facility: CLINIC | Age: 38
End: 2023-12-05
Payer: MEDICAID

## 2023-12-05 ENCOUNTER — LAB (OUTPATIENT)
Dept: INTERNAL MEDICINE | Facility: CLINIC | Age: 38
End: 2023-12-05
Payer: MEDICAID

## 2023-12-05 VITALS
HEIGHT: 63 IN | WEIGHT: 202.2 LBS | BODY MASS INDEX: 35.83 KG/M2 | DIASTOLIC BLOOD PRESSURE: 80 MMHG | OXYGEN SATURATION: 99 % | TEMPERATURE: 98 F | HEART RATE: 88 BPM | SYSTOLIC BLOOD PRESSURE: 120 MMHG

## 2023-12-05 DIAGNOSIS — M79.89 SWELLING OF RIGHT LOWER EXTREMITY: ICD-10-CM

## 2023-12-05 DIAGNOSIS — E55.9 VITAMIN D DEFICIENCY: ICD-10-CM

## 2023-12-05 DIAGNOSIS — E11.9 TYPE 2 DIABETES MELLITUS WITHOUT COMPLICATION, WITHOUT LONG-TERM CURRENT USE OF INSULIN: Primary | ICD-10-CM

## 2023-12-05 DIAGNOSIS — Z23 NEED FOR INFLUENZA VACCINATION: ICD-10-CM

## 2023-12-05 DIAGNOSIS — M79.671 RIGHT FOOT PAIN: ICD-10-CM

## 2023-12-05 LAB
EXPIRATION DATE: ABNORMAL
HBA1C MFR BLD: 6.9 % (ref 4.5–5.7)
Lab: ABNORMAL

## 2023-12-05 NOTE — LETTER
December 5, 2023     Patient: Yasmin Singleton   YOB: 1985   Date of Visit: 12/5/2023       To Whom It May Concern:    It is my medical opinion that Yasmin Singleton may return to work in two days.         Sincerely,        JAIMIE Wiley    CC: No Recipients

## 2023-12-05 NOTE — PROGRESS NOTES
"Chief Complaint   Patient presents with    Foot Swelling     Left foot        HPI  Yasmin Singleton is a 38 y.o. female presents for follow-up on diabetes.  Having problems getting the correct test strips from the pharmacy.  Also complains of LLE swelling.  This was first noticed on .  She denies any trauma.  The foot is painful to walk on.      The following portions of the patient's history were reviewed and updated as appropriate: allergies, current medications, past family history, past medical history, past social history, past surgical history, and problem list.    Subjective  Review of Systems   Constitutional:  Negative for activity change, appetite change and fatigue.   HENT:  Negative for congestion.    Respiratory:  Negative for cough and shortness of breath.    Cardiovascular:  Positive for leg swelling. Negative for chest pain.   Gastrointestinal:  Negative for abdominal pain.   Musculoskeletal:  Positive for arthralgias.   Neurological:  Negative for dizziness, weakness and confusion.   Psychiatric/Behavioral:  Negative for behavioral problems and decreased concentration.        Objective  Visit Vitals  /80 (BP Location: Left arm, Patient Position: Sitting)   Pulse 88   Temp 98 °F (36.7 °C)   Ht 160 cm (63\")   Wt 91.7 kg (202 lb 3.2 oz)   SpO2 99%   BMI 35.82 kg/m²        Physical Exam  Vitals and nursing note reviewed.   HENT:      Head: Normocephalic.   Eyes:      Pupils: Pupils are equal, round, and reactive to light.   Cardiovascular:      Rate and Rhythm: Normal rate and regular rhythm.      Pulses: Normal pulses.      Heart sounds: Normal heart sounds.   Pulmonary:      Effort: Pulmonary effort is normal.      Breath sounds: Normal breath sounds.   Musculoskeletal:      Right lower leg: No edema.      Left lower le+      Left foot: Swelling and tenderness present.   Skin:     General: Skin is warm and dry.      Capillary Refill: Capillary refill takes less than 2 seconds. "   Neurological:      General: No focal deficit present.      Mental Status: She is alert and oriented to person, place, and time.      Gait: Gait is intact.   Psychiatric:         Attention and Perception: Attention normal.         Mood and Affect: Mood normal.         Behavior: Behavior normal.          Procedures       Results for orders placed or performed in visit on 12/05/23   POC Glycosylated Hemoglobin (Hb A1C)    Specimen: Blood   Result Value Ref Range    Hemoglobin A1C 6.9 (A) 4.5 - 5.7 %    Lot Number [id number]     Expiration Date 07/02/2025           Assessment and Plan  Diagnoses and all orders for this visit:    1. Type 2 diabetes mellitus without complication, without long-term current use of insulin (Primary)  -     POC Glycosylated Hemoglobin (Hb A1C)  -     glucose blood test strip; 1 each by Other route 2 (Two) Times a Day With Meals. Use as instructed  Dispense: 100 each; Refill: 5    2. Swelling of right lower extremity  -     D-dimer, Quantitative; Future    3. Right foot pain  -     Uric acid; Future  -     CBC w AUTO Differential; Future    4. Need for influenza vaccination  -     Fluzone >6 Months ([id number])      A1c still well controlled on Metformin, continue  No trauma  Check labs, if neg will order doppler r/o DVT  Cont Vit D      Return in about 6 months (around 6/5/2024) for Annual.         JAIMIE Wiley

## 2023-12-06 ENCOUNTER — TELEPHONE (OUTPATIENT)
Dept: INTERNAL MEDICINE | Facility: CLINIC | Age: 38
End: 2023-12-06

## 2023-12-06 ENCOUNTER — LAB (OUTPATIENT)
Dept: INTERNAL MEDICINE | Facility: CLINIC | Age: 38
End: 2023-12-06
Payer: MEDICAID

## 2023-12-06 DIAGNOSIS — M79.671 RIGHT FOOT PAIN: ICD-10-CM

## 2023-12-06 DIAGNOSIS — M79.89 SWELLING OF RIGHT LOWER EXTREMITY: ICD-10-CM

## 2023-12-06 LAB
BASOPHILS # BLD AUTO: 0.04 10*3/MM3 (ref 0–0.2)
BASOPHILS NFR BLD AUTO: 0.6 % (ref 0–1.5)
D DIMER PPP FEU-MCNC: 0.39 MCGFEU/ML (ref 0–0.5)
DEPRECATED RDW RBC AUTO: 44.5 FL (ref 37–54)
EOSINOPHIL # BLD AUTO: 0.17 10*3/MM3 (ref 0–0.4)
EOSINOPHIL NFR BLD AUTO: 2.4 % (ref 0.3–6.2)
ERYTHROCYTE [DISTWIDTH] IN BLOOD BY AUTOMATED COUNT: 14.2 % (ref 12.3–15.4)
HCT VFR BLD AUTO: 39.9 % (ref 34–46.6)
HGB BLD-MCNC: 13.5 G/DL (ref 12–15.9)
IMM GRANULOCYTES # BLD AUTO: 0.02 10*3/MM3 (ref 0–0.05)
IMM GRANULOCYTES NFR BLD AUTO: 0.3 % (ref 0–0.5)
LYMPHOCYTES # BLD AUTO: 3.14 10*3/MM3 (ref 0.7–3.1)
LYMPHOCYTES NFR BLD AUTO: 44.9 % (ref 19.6–45.3)
MCH RBC QN AUTO: 29.5 PG (ref 26.6–33)
MCHC RBC AUTO-ENTMCNC: 33.8 G/DL (ref 31.5–35.7)
MCV RBC AUTO: 87.1 FL (ref 79–97)
MONOCYTES # BLD AUTO: 0.44 10*3/MM3 (ref 0.1–0.9)
MONOCYTES NFR BLD AUTO: 6.3 % (ref 5–12)
NEUTROPHILS NFR BLD AUTO: 3.19 10*3/MM3 (ref 1.7–7)
NEUTROPHILS NFR BLD AUTO: 45.5 % (ref 42.7–76)
NRBC BLD AUTO-RTO: 0.1 /100 WBC (ref 0–0.2)
PLATELET # BLD AUTO: 265 10*3/MM3 (ref 140–450)
PMV BLD AUTO: 10.2 FL (ref 6–12)
RBC # BLD AUTO: 4.58 10*6/MM3 (ref 3.77–5.28)
WBC NRBC COR # BLD AUTO: 7 10*3/MM3 (ref 3.4–10.8)

## 2023-12-06 PROCEDURE — 85379 FIBRIN DEGRADATION QUANT: CPT | Performed by: NURSE PRACTITIONER

## 2023-12-06 PROCEDURE — 36415 COLL VENOUS BLD VENIPUNCTURE: CPT | Performed by: NURSE PRACTITIONER

## 2023-12-06 PROCEDURE — 84550 ASSAY OF BLOOD/URIC ACID: CPT | Performed by: NURSE PRACTITIONER

## 2023-12-06 PROCEDURE — 85025 COMPLETE CBC W/AUTO DIFF WBC: CPT | Performed by: NURSE PRACTITIONER

## 2023-12-06 NOTE — TELEPHONE ENCOUNTER
Caller: Yasmin Singleton    Relationship: Self    Best call back number:     What is the medical concern/diagnosis: LEFT ANKLE  PAIN    What specialty or service is being requested:  UNKNOWN    What is the provider, practice or medical service name: UNKNOWN    What is the office location: UNKNOWN    What is the office phone number: UNKNOWN    Any additional details: THE PATIENT SAW JAIMIE MARTINEZ ON 12/0523 AND WAS TOLD THAT SOMEONE WOULD CALL THE PATIENT BACK TODAY  TO SEND HER TO ANOTHER DR. THE PATIENT IS NOT SURE WHICH TYPE OF DR.    PLEASE CALL THE PATIENT TO LET HER KNOW ASAP.

## 2023-12-07 LAB — URATE SERPL-MCNC: 4.6 MG/DL (ref 2.4–5.7)

## 2023-12-08 ENCOUNTER — TELEPHONE (OUTPATIENT)
Dept: INTERNAL MEDICINE | Facility: CLINIC | Age: 38
End: 2023-12-08
Payer: MEDICAID

## 2023-12-08 NOTE — TELEPHONE ENCOUNTER
Caller: Yasmin Singleton    Relationship: Self    Best call back number: [phone]    What is the best time to reach you: ANY TIME    Who are you requesting to speak with (clinical staff, provider,  specific staff member): YASMINE MARTINEZ    Do you know the name of the person who called: SELF    What was the call regarding: PATIENT STATES FOOT IS SLIGHTLY SWOLLEN.PATIENT RECEIVED A CALL STATING THAT IF PATIENT WAS STILL HAVING PAIN IN A WEEK OR SO TO CALL SO CAN CHECK FOR A BLOOD CLOT. PATIENT WAS A BIT CONFUSED ON WHY PROVIDER WOULD WAIT A WEEK TO SEE IF THERE WAS A BLOOD CLOT. PLEASE ADVISE

## 2023-12-11 ENCOUNTER — NURSE TRIAGE (OUTPATIENT)
Dept: CALL CENTER | Facility: HOSPITAL | Age: 38
End: 2023-12-11
Payer: MEDICAID

## 2023-12-11 NOTE — TELEPHONE ENCOUNTER
"HUB transferred call. Unable to reach office    Saw PCP 12/05/2023 JAIMIE Landry for pain swelling in left foot  Checked for gout and she does not have gout    Left foot is swollen has been swollen since 12/04/2023    She was told she would send her over for a possible blood clot if pain continued over a week.    Attempt to reach PCP office, no answer after multiple rings.    Will send note to PCP high priority asking for a callback. Patient agreeable to this.    Reason for Disposition   [1] MODERATE pain (e.g., interferes with normal activities, limping) AND [2] present > 3 days    Additional Information   Negative: Followed a foot injury   Negative: Diabetes mellitus   Negative: Toe pain is main symptom   Negative: Ankle pain is main symptom   Negative: Thigh or calf pain is main symptom   Negative: Entire foot is cool or blue in comparison to other foot   Negative: Purple or black skin on foot or toe   Negative: [1] Red area or streak AND [2] fever   Negative: [1] Swollen foot AND [2] fever   Negative: Patient sounds very sick or weak to the triager   Negative: [1] SEVERE pain (e.g., excruciating, unable to do any normal activities) AND [2] not improved after 2 hours of pain medicine   Negative: [1] Looks infected (spreading redness, pus) AND [2] large red area (> 2 in. or 5 cm)   Negative: Looks like a boil, infected sore, or deep ulcer   Negative: [1] Redness of the skin AND [2] no fever   Negative: [1] Swollen foot AND [2] no fever  (Exceptions: localized bump from bunions, calluses, insect bite, sting)   Negative: Weakness (i.e., loss of strength) of new-onset in foot or toes  (Exceptions: not truly weak, foot feels weak because of pain; weakness present > 2 weeks)   Negative: Numbness (i.e., loss of sensation) in foot or toes  (Exception: Just tingling; numbness present > 2 weeks.)    Answer Assessment - Initial Assessment Questions  1. ONSET: \"When did the pain start?\"      12/04/2023  2. LOCATION: " "\"Where is the pain located?\"   Left foot  3. PAIN: \"How bad is the pain?\"    (Scale 1-10; or mild, moderate, severe)   - MILD (1-3): doesn't interfere with normal activities.    - MODERATE (4-7): interferes with normal activities (e.g., work or school) or awakens from sleep, limping.    - SEVERE (8-10): excruciating pain, unable to do any normal activities, unable to walk.     4/10   hurts with walking  4. WORK OR EXERCISE: \"Has there been any recent work or exercise that involved this part of the body?\"       *No Answer*  5. CAUSE: \"What do you think is causing the foot pain?\"   Does not have gout mention of blood clot by provider at office visit  6. OTHER SYMPTOMS: \"Do you have any other symptoms?\" (e.g., leg pain, rash, fever, numbness)      Foot pain  denies numbness/tingling, no redness, does not feel hot  7. PREGNANCY: \"Is there any chance you are pregnant?\" \"When was your last menstrual period?\"    na    Protocols used: Foot Pain-ADULT-AH    "

## 2023-12-11 NOTE — TELEPHONE ENCOUNTER
Hub staff attempted to follow warm transfer process and was unsuccessful     Caller: Yasmin Singleton    Relationship to patient: Self    Best call back number: [phone]     Patient is needing: PATIENT THINKS SHE MAY HAVE A BLOOD CLOT- UNABLE TO REACH OFFICE OR NCC

## 2023-12-21 ENCOUNTER — HOSPITAL ENCOUNTER (OUTPATIENT)
Dept: ULTRASOUND IMAGING | Facility: HOSPITAL | Age: 38
Discharge: HOME | End: 2023-12-21
Admitting: PHYSICIAN ASSISTANT
Payer: MEDICAID

## 2023-12-21 ENCOUNTER — OFFICE VISIT (OUTPATIENT)
Dept: INTERNAL MEDICINE | Facility: CLINIC | Age: 38
End: 2023-12-21
Payer: MEDICAID

## 2023-12-21 VITALS
HEART RATE: 106 BPM | TEMPERATURE: 97.3 F | HEIGHT: 63 IN | BODY MASS INDEX: 35.05 KG/M2 | DIASTOLIC BLOOD PRESSURE: 82 MMHG | OXYGEN SATURATION: 97 % | SYSTOLIC BLOOD PRESSURE: 116 MMHG | WEIGHT: 197.8 LBS

## 2023-12-21 DIAGNOSIS — M54.41 CHRONIC MIDLINE LOW BACK PAIN WITH BILATERAL SCIATICA: Primary | ICD-10-CM

## 2023-12-21 DIAGNOSIS — R10.2 PELVIC PAIN: ICD-10-CM

## 2023-12-21 DIAGNOSIS — R31.0 GROSS HEMATURIA: ICD-10-CM

## 2023-12-21 DIAGNOSIS — M54.42 CHRONIC MIDLINE LOW BACK PAIN WITH BILATERAL SCIATICA: Primary | ICD-10-CM

## 2023-12-21 DIAGNOSIS — G89.29 CHRONIC MIDLINE LOW BACK PAIN WITH BILATERAL SCIATICA: Primary | ICD-10-CM

## 2023-12-21 DIAGNOSIS — R10.84 GENERALIZED ABDOMINAL PAIN: ICD-10-CM

## 2023-12-21 LAB
BACTERIA UR QL AUTO: ABNORMAL /HPF
BILIRUB BLD-MCNC: NEGATIVE MG/DL
BILIRUB UR QL STRIP: NEGATIVE
CLARITY UR: ABNORMAL
CLARITY, POC: CLEAR
COLOR UR: YELLOW
COLOR UR: YELLOW
EXPIRATION DATE: ABNORMAL
GLUCOSE UR STRIP-MCNC: NEGATIVE MG/DL
GLUCOSE UR STRIP-MCNC: NEGATIVE MG/DL
HGB UR QL STRIP.AUTO: ABNORMAL
HYALINE CASTS UR QL AUTO: ABNORMAL /LPF
KETONES UR QL STRIP: ABNORMAL
KETONES UR QL: NEGATIVE
LEUKOCYTE EST, POC: NEGATIVE
LEUKOCYTE ESTERASE UR QL STRIP.AUTO: ABNORMAL
Lab: ABNORMAL
NITRITE UR QL STRIP: NEGATIVE
NITRITE UR-MCNC: NEGATIVE MG/ML
PH UR STRIP.AUTO: 6 [PH] (ref 5–8)
PH UR: 6 [PH] (ref 5–8)
PROT UR QL STRIP: ABNORMAL
PROT UR STRIP-MCNC: ABNORMAL MG/DL
RBC # UR STRIP: ABNORMAL /HPF
RBC # UR STRIP: ABNORMAL /UL
REF LAB TEST METHOD: ABNORMAL
SP GR UR STRIP: 1.02 (ref 1–1.03)
SP GR UR: 1.02 (ref 1–1.03)
SQUAMOUS #/AREA URNS HPF: ABNORMAL /HPF
UROBILINOGEN UR QL STRIP: ABNORMAL
UROBILINOGEN UR QL: NORMAL
WBC # UR STRIP: ABNORMAL /HPF

## 2023-12-21 PROCEDURE — 3074F SYST BP LT 130 MM HG: CPT | Performed by: PHYSICIAN ASSISTANT

## 2023-12-21 PROCEDURE — 1160F RVW MEDS BY RX/DR IN RCRD: CPT | Performed by: PHYSICIAN ASSISTANT

## 2023-12-21 PROCEDURE — 1159F MED LIST DOCD IN RCRD: CPT | Performed by: PHYSICIAN ASSISTANT

## 2023-12-21 PROCEDURE — 76856 US EXAM PELVIC COMPLETE: CPT

## 2023-12-21 PROCEDURE — 99214 OFFICE O/P EST MOD 30 MIN: CPT | Performed by: PHYSICIAN ASSISTANT

## 2023-12-21 PROCEDURE — 87086 URINE CULTURE/COLONY COUNT: CPT | Performed by: PHYSICIAN ASSISTANT

## 2023-12-21 PROCEDURE — 81001 URINALYSIS AUTO W/SCOPE: CPT | Performed by: PHYSICIAN ASSISTANT

## 2023-12-21 PROCEDURE — 3044F HG A1C LEVEL LT 7.0%: CPT | Performed by: PHYSICIAN ASSISTANT

## 2023-12-21 PROCEDURE — 3079F DIAST BP 80-89 MM HG: CPT | Performed by: PHYSICIAN ASSISTANT

## 2023-12-21 NOTE — PROGRESS NOTES
[facility] PRIMARY [facility]ARE  [address]  [facility] [address]  Dept: [phone]  Dept Fax: [phone]  Loc: [phone]  Loc Fax: [phone]    Yasmin Singleton  1985    Follow Up Office Visit Note    History of Present Illness:  Patient is a 38-year-old female with known history of lumbar spinal problems in today for low back pain and hematuria.  Patient states that she had some low back pain accompanied by pelvic discomfort and pressure.  Patient then subsequently had some blood in her urine.  Patient states that her low back pain might be separate from her other symptoms.  Patient describes sharp and burning pain in her lower back that radiates down into both of her legs.  Similar to lumbar spinal issue symptoms that she has had in the past.        The following portions of the patient's history were reviewed and updated as appropriate: allergies, current medications, past family history, past medical history, past social history, past surgical history, and problem list.    Medications:    Current Outpatient Medications:     Boric Acid suppository Suppository, Insert 1 suppository into the vagina Every Night., Disp: 21 suppository, Rfl: 0    busPIRone (BUSPAR) 10 MG tablet, Take 1 tablet PO 2-3 times a day, Disp: 90 tablet, Rfl: 3    famotidine (PEPCID) 20 MG tablet, TAKE ONE TABLET BY MOUTH TWICE A DAY AS NEEDED FOR HEARTBURN, Disp: 180 tablet, Rfl: 2    glucose blood test strip, 1 each by Other route 2 (Two) Times a Day With Meals. Use as instructed, Disp: 100 each, Rfl: 5    Lancets (freestyle) lancets, 1 each by Other route Daily. Use as instructed, Disp: 100 each, Rfl: 5    levothyroxine (SYNTHROID, LEVOTHROID) 100 MCG tablet, Take 1 tablet by mouth Daily., Disp: , Rfl:     loratadine (CLARITIN) 10 MG tablet, Take 1 tablet by mouth Daily., Disp: 30 tablet, Rfl: 5    metFORMIN ER (GLUCOPHAGE-XR) 750 MG 24 hr tablet, TAKE ONE TABLET BY MOUTH DAILY WITH  BREAKFAST, Disp: 90 tablet, Rfl: 1    Prenatal Vit-Fe Fumarate-FA (Prenatal 27-1) 27-1 MG tablet tablet, Take 1 tablet by mouth Daily., Disp: 90 each, Rfl: 2    vitamin D (ERGOCALCIFEROL) 1.25 MG (43269 UT) capsule capsule, TAKE ONE CAPSULE BY MOUTH ONCE WEEKLY, Disp: 12 capsule, Rfl: 1    Subjective  No Known Allergies     Past Medical History:   Diagnosis Date    Diabetes     GERD (gastroesophageal reflux disease)     History of recurrent UTIs     Seasonal allergies        Past Surgical History:   Procedure Laterality Date    WISDOM TOOTH EXTRACTION  2015       Family History   Problem Relation Age of Onset    Kidney failure Mother     Cancer Father         Unsure what kind    Hypertension Father     Stroke Father     Breast cancer Neg Hx     Ovarian cancer Neg Hx         Social History     Socioeconomic History    Marital status: Single   Tobacco Use    Smoking status: Every Day     Packs/day: 0.50     Years: 12.00     Additional pack years: 0.00     Total pack years: 6.00     Types: Cigarettes     Start date: 2006     Passive exposure: Past    Smokeless tobacco: Never   Vaping Use    Vaping Use: Never used   Substance and Sexual Activity    Alcohol use: Yes     Comment: on occasion, at most 1-2 drinks a month    Drug use: Yes     Frequency: 4.0 times per week     Types: Marijuana    Sexual activity: Yes     Partners: Male     Birth control/protection: None       Review of Systems   Constitutional:  Negative for activity change, chills, fatigue, fever and unexpected weight change.   HENT:  Negative for congestion, ear pain, postnasal drip, sinus pressure and sore throat.    Eyes:  Negative for pain, discharge and redness.   Respiratory:  Negative for cough, shortness of breath and wheezing.    Cardiovascular:  Negative for chest pain, palpitations and leg swelling.   Gastrointestinal:  Positive for abdominal pain. Negative for diarrhea, nausea and vomiting.   Endocrine: Negative for cold intolerance and heat  "intolerance.   Genitourinary:  Positive for hematuria and pelvic pain. Negative for decreased urine volume and dysuria.   Musculoskeletal:  Positive for arthralgias, back pain and myalgias.   Skin:  Negative for rash and wound.   Neurological:  Negative for dizziness, light-headedness and headaches.   Hematological:  Does not bruise/bleed easily.   Psychiatric/Behavioral:  Negative for confusion, dysphoric mood and sleep disturbance. The patient is not nervous/anxious.          Objective  Vitals:    12/21/23 0953   BP: 116/82   BP Location: Left arm   Patient Position: Sitting   Cuff Size: Large Adult   Pulse: 106   Temp: 97.3 °F (36.3 °C)   TempSrc: Temporal   SpO2: 97%   Weight: 89.7 kg (197 lb 12.8 oz)   Height: 160 cm (62.99\")     Body mass index is 35.05 kg/m².     Physical Exam  Physical Exam  Vitals and nursing note reviewed.   Constitutional:       General: She is not in acute distress.     Appearance: She is not ill-appearing.   HENT:      Head: Normocephalic.      Right Ear: Tympanic membrane, ear canal and external ear normal. There is no impacted cerumen.      Left Ear: Tympanic membrane, ear canal and external ear normal. There is no impacted cerumen.      Nose: No congestion or rhinorrhea.      Mouth/Throat:      Mouth: Mucous membranes are moist.      Pharynx: Oropharynx is clear. No oropharyngeal exudate or posterior oropharyngeal erythema.   Eyes:      General:         Right eye: No discharge.         Left eye: No discharge.      Extraocular Movements: Extraocular movements intact.      Conjunctiva/sclera: Conjunctivae normal.      Pupils: Pupils are equal, round, and reactive to light.   Cardiovascular:      Rate and Rhythm: Normal rate and regular rhythm.      Heart sounds: Normal heart sounds. No murmur heard.     No friction rub. No gallop.   Pulmonary:      Effort: Pulmonary effort is normal. No respiratory distress.      Breath sounds: Normal breath sounds. No wheezing.   Abdominal:      " General: Bowel sounds are normal. There is no distension.      Palpations: Abdomen is soft. There is no mass.      Tenderness: There is no abdominal tenderness.   Musculoskeletal:         General: Tenderness (To palpation of tissue and musculature overlying lumbar spine) present. No swelling. Normal range of motion.      Cervical back: Normal range of motion. No tenderness.      Right lower leg: No edema.      Left lower leg: No edema.   Lymphadenopathy:      Cervical: No cervical adenopathy.   Skin:     Findings: No bruising, erythema or rash.   Neurological:      Mental Status: She is oriented to person, place, and time.      Gait: Gait normal.   Psychiatric:         Mood and Affect: Mood normal.         Behavior: Behavior normal.         Thought Content: Thought content normal.         Judgment: Judgment normal.         Diagnostic Data  Procedures    Assessment  Diagnoses and all orders for this visit:    1. Chronic midline low back pain with bilateral sciatica (Primary)  -     XR Spine Lumbar 4+ View; Future  -     Ambulatory Referral to Physical Therapy Evaluate and treat    2. Generalized abdominal pain  -     US Abdomen Complete; Future    3. Pelvic pain  -     Cancel: US Pelvis Complete; Future  -     US Pelvis Complete; Future    4. Gross hematuria  -     CBC (No Diff)  -     Comprehensive Metabolic Panel  -     Urine Culture - Urine, Urine, Clean Catch; Future  -     Urinalysis With Microscopic - Urine, Clean Catch; Future        Plan    1. Chronic midline low back pain with bilateral sciatica (Primary)- worse, obtain x-ray lumbar spine and refer to physical therapy.    2. Generalized abdominal pain- worse, obtain stat ultrasound abdomen.    3. Pelvic pain- worse, obtain stat ultrasound pelvis.    4. Gross hematuria- UA negative in office today.  Obtain culture and sensitivity.  Obtain microanalysis.  Obtain CBC and CMP.      Return in about 6 weeks (around 2/1/2024) for Recheck.    Gerson Sifuentes,  ESTER  12/21/2023

## 2023-12-22 ENCOUNTER — HOSPITAL ENCOUNTER (OUTPATIENT)
Dept: GENERAL RADIOLOGY | Facility: HOSPITAL | Age: 38
Discharge: HOME | End: 2023-12-22
Payer: MEDICAID

## 2023-12-22 ENCOUNTER — HOSPITAL ENCOUNTER (OUTPATIENT)
Dept: ULTRASOUND IMAGING | Facility: HOSPITAL | Age: 38
Discharge: HOME | End: 2023-12-22
Payer: MEDICAID

## 2023-12-22 ENCOUNTER — CLINICAL SUPPORT (OUTPATIENT)
Dept: INTERNAL MEDICINE | Facility: CLINIC | Age: 38
End: 2023-12-22
Payer: MEDICAID

## 2023-12-22 DIAGNOSIS — R31.9 HEMATURIA, UNSPECIFIED TYPE: Primary | ICD-10-CM

## 2023-12-22 DIAGNOSIS — G89.29 CHRONIC MIDLINE LOW BACK PAIN WITH BILATERAL SCIATICA: ICD-10-CM

## 2023-12-22 DIAGNOSIS — N89.8 VAGINAL DISCHARGE: ICD-10-CM

## 2023-12-22 DIAGNOSIS — R31.9 HEMATURIA, UNSPECIFIED TYPE: ICD-10-CM

## 2023-12-22 DIAGNOSIS — R10.84 GENERALIZED ABDOMINAL PAIN: ICD-10-CM

## 2023-12-22 DIAGNOSIS — M54.42 CHRONIC MIDLINE LOW BACK PAIN WITH BILATERAL SCIATICA: ICD-10-CM

## 2023-12-22 DIAGNOSIS — M54.41 CHRONIC MIDLINE LOW BACK PAIN WITH BILATERAL SCIATICA: ICD-10-CM

## 2023-12-22 PROCEDURE — 87798 DETECT AGENT NOS DNA AMP: CPT | Performed by: PHYSICIAN ASSISTANT

## 2023-12-22 PROCEDURE — 87529 HSV DNA AMP PROBE: CPT | Performed by: PHYSICIAN ASSISTANT

## 2023-12-22 PROCEDURE — 87661 TRICHOMONAS VAGINALIS AMPLIF: CPT | Performed by: PHYSICIAN ASSISTANT

## 2023-12-22 PROCEDURE — 87801 DETECT AGNT MULT DNA AMPLI: CPT | Performed by: PHYSICIAN ASSISTANT

## 2023-12-22 PROCEDURE — 72110 X-RAY EXAM L-2 SPINE 4/>VWS: CPT

## 2023-12-22 PROCEDURE — 87591 N.GONORRHOEAE DNA AMP PROB: CPT | Performed by: PHYSICIAN ASSISTANT

## 2023-12-22 PROCEDURE — 87491 CHLMYD TRACH DNA AMP PROBE: CPT | Performed by: PHYSICIAN ASSISTANT

## 2023-12-22 RX ORDER — NITROFURANTOIN 25; 75 MG/1; MG/1
100 CAPSULE ORAL 2 TIMES DAILY
Qty: 10 CAPSULE | Refills: 0 | Status: SHIPPED | OUTPATIENT
Start: 2023-12-22 | End: 2023-12-27

## 2023-12-23 LAB — BACTERIA SPEC AEROBE CULT: NORMAL

## 2023-12-27 ENCOUNTER — CLINICAL SUPPORT (OUTPATIENT)
Dept: INTERNAL MEDICINE | Facility: CLINIC | Age: 38
End: 2023-12-27
Payer: MEDICAID

## 2023-12-27 DIAGNOSIS — R31.9 HEMATURIA, UNSPECIFIED TYPE: Primary | ICD-10-CM

## 2023-12-27 PROCEDURE — 87086 URINE CULTURE/COLONY COUNT: CPT | Performed by: PHYSICIAN ASSISTANT

## 2023-12-28 LAB
A VAGINAE DNA VAG QL NAA+PROBE: ABNORMAL SCORE
BACTERIA SPEC AEROBE CULT: NO GROWTH
BVAB2 DNA VAG QL NAA+PROBE: ABNORMAL SCORE
C ALBICANS DNA VAG QL NAA+PROBE: NEGATIVE
C GLABRATA DNA VAG QL NAA+PROBE: NEGATIVE
C TRACH DNA VAG QL NAA+PROBE: NEGATIVE
HSV1 DNA SPEC QL NAA+PROBE: NEGATIVE
HSV2 DNA SPEC QL NAA+PROBE: NEGATIVE
MEGA1 DNA VAG QL NAA+PROBE: ABNORMAL SCORE
N GONORRHOEA DNA VAG QL NAA+PROBE: NEGATIVE
T VAGINALIS DNA VAG QL NAA+PROBE: NEGATIVE

## 2023-12-28 RX ORDER — METRONIDAZOLE 500 MG/1
500 TABLET ORAL 2 TIMES DAILY
Qty: 14 TABLET | Refills: 0 | Status: SHIPPED | OUTPATIENT
Start: 2023-12-28 | End: 2024-01-04

## 2024-01-02 ENCOUNTER — OFFICE VISIT (OUTPATIENT)
Dept: UROLOGY | Facility: CLINIC | Age: 39
End: 2024-01-02
Payer: COMMERCIAL

## 2024-01-02 VITALS — HEART RATE: 90 BPM | HEIGHT: 63 IN | OXYGEN SATURATION: 97 % | WEIGHT: 197 LBS | BODY MASS INDEX: 34.91 KG/M2

## 2024-01-02 DIAGNOSIS — R31.0 MACROSCOPIC HEMATURIA: Primary | ICD-10-CM

## 2024-01-02 PROCEDURE — 99204 OFFICE O/P NEW MOD 45 MIN: CPT | Performed by: UROLOGY

## 2024-01-02 RX ORDER — SPIRONOLACTONE 50 MG/1
TABLET, FILM COATED ORAL
COMMUNITY
Start: 2023-12-11

## 2024-01-02 NOTE — PROGRESS NOTES
Hematuria Female Office Visit      Patient Name: Yasmin Singleton  : 1985   MRN: [id number]     Chief Complaint: Microscopic Gross hematuria.   Chief Complaint   Patient presents with    Blood in Urine       Referring Provider: Gerson Sifuentes, *    History of Present Illness: Ms. Singleton is a 38 y.o. female with history of macroscopic gross hematuria. She initially saw this around 2023 for two days. She also was experiencing some lower back pain around that time prompting visit to her PCP. She has previously had some chronic lower back pain but her current symptoms feel different and worse. She has urine testing done which showed TNTC RBCs on micro, and 4+ bacteria. Urine culture grew 50k MUGF, and she was treated with a 5d course of Macrobid. She was not having any urinary symptoms at that time. Testing was also positive for BV at this time, so she was treated with a course of Flagyl. She has not had any further blood in her urine since then. She continues to have some back discomfort.    She is a current smoker, now down to 0.5 PPD from 1 PPD previously. She has been smoking for close to 20 years. No family history of  cancers, though her father had cancer of unknown origin. She does not take any blood thinners.      Subjective      Review of System: Review of Systems   Genitourinary:  Positive for hematuria. Negative for decreased urine volume, difficulty urinating, dysuria, enuresis, flank pain, frequency and urgency.      I have reviewed the ROS documented by my clinical staff, updated as appropriate and I agree. Deon Dunbar MD    Past Medical History:   Past Medical History:   Diagnosis Date    Diabetes     GERD (gastroesophageal reflux disease)     History of recurrent UTIs     Seasonal allergies        Past Surgical History:   Past Surgical History:   Procedure Laterality Date    WISDOM TOOTH EXTRACTION         Family History:   Family History   Problem Relation Age of Onset     Kidney failure Mother     Cancer Father         Unsure what kind    Hypertension Father     Stroke Father     Breast cancer Neg Hx     Ovarian cancer Neg Hx        Social History:   Social History     Socioeconomic History    Marital status: Single   Tobacco Use    Smoking status: Every Day     Packs/day: 0.50     Years: 12.00     Additional pack years: 0.00     Total pack years: 6.00     Types: Cigarettes     Start date: 2006     Passive exposure: Past    Smokeless tobacco: Never   Vaping Use    Vaping Use: Never used   Substance and Sexual Activity    Alcohol use: Yes     Comment: on occasion, at most 1-2 drinks a month    Drug use: Yes     Frequency: 4.0 times per week     Types: Marijuana    Sexual activity: Yes     Partners: Male     Birth control/protection: None       Medications:     Current Outpatient Medications:     Boric Acid suppository Suppository, Insert 1 suppository into the vagina Every Night., Disp: 21 suppository, Rfl: 0    busPIRone (BUSPAR) 10 MG tablet, Take 1 tablet PO 2-3 times a day, Disp: 90 tablet, Rfl: 3    famotidine (PEPCID) 20 MG tablet, TAKE ONE TABLET BY MOUTH TWICE A DAY AS NEEDED FOR HEARTBURN, Disp: 180 tablet, Rfl: 2    glucose blood test strip, 1 each by Other route 2 (Two) Times a Day With Meals. Use as instructed, Disp: 100 each, Rfl: 5    Lancets (freestyle) lancets, 1 each by Other route Daily. Use as instructed, Disp: 100 each, Rfl: 5    levothyroxine (SYNTHROID, LEVOTHROID) 100 MCG tablet, Take 1 tablet by mouth Daily., Disp: , Rfl:     loratadine (CLARITIN) 10 MG tablet, Take 1 tablet by mouth Daily., Disp: 30 tablet, Rfl: 5    metFORMIN ER (GLUCOPHAGE-XR) 750 MG 24 hr tablet, TAKE ONE TABLET BY MOUTH DAILY WITH BREAKFAST, Disp: 90 tablet, Rfl: 1    metroNIDAZOLE (FLAGYL) 500 MG tablet, Take 1 tablet by mouth 2 (Two) Times a Day for 7 days., Disp: 14 tablet, Rfl: 0    Prenatal Vit-Fe Fumarate-FA (Prenatal 27-1) 27-1 MG tablet tablet, Take 1 tablet by mouth Daily.,  "Disp: 90 each, Rfl: 2    spironolactone (ALDACTONE) 50 MG tablet, , Disp: , Rfl:     vitamin D (ERGOCALCIFEROL) 1.25 MG (64181 UT) capsule capsule, TAKE ONE CAPSULE BY MOUTH ONCE WEEKLY, Disp: 12 capsule, Rfl: 1    Allergies:   No Known Allergies      Bladder & Bowel Symptom Questionnaire    How often do you usually urinate during the day ?   3 - About every 1-2 hours   2.   How many timed do you urinate at night?   3 - About every 1-2 hours   3.   What is the reason that you usually urinate?   2 - About every 2-3 hours    4.   Once you get the urge to go, how long can you     comfortably delay?   2 - About every 2-3 hours    5.   How often do you get a sudden urge that makes you rush to the bathroom?   2 - About every 2-3 hours    6.   How often does a sudden urge to urinate result in you leaking urine or wetting pads?   0 - No more often than once in 4 hours    7.  In your opinion, how good is your bladder control?   2 - About every 2-3 hours    8.  Do you have accidental bowel leakage?   no   9.  Do you have difficulty fully emptying your bladder?   no   10.  Do you experience accidental leakage when performing some physical activity such as coughing, sneezing, laughing or exercise?   yes   11. Have you tried medications to help improve your symptoms?   no   12. Would you be interested in learning about a long-lasting option that may help you with your symptoms?   yes                                                                             Total Score   14     0-7 (Mild) 8-16 (Moderate) 17-28 (Severe)         Objective     Physical Exam:   Vital Signs:   Vitals:    01/02/24 1028   Pulse: 90   SpO2: 97%   Weight: 89.4 kg (197 lb)   Height: 160 cm (62.99\")   PainSc: 0-No pain     Body mass index is 34.91 kg/m².     Physical Exam    Physical Exam:     GEN: NAD, alert and oriented  HEENT: NCAT, EOMI  RESP: Equal bilateral chest rise, normal work of breathing  CV: Regular rate, appears well perfused  ABD: Soft, " non-tender, non-distended  : No CVAT, no suprapubic tenderness  Ext: No gross deformities, normal ROM  MSK: Full ROM in the bilateral upper extremities  NEURO: No focal deficits  PSYCH: Normal mood and affect      Labs:   Brief Urine Lab Results  (Last result in the past 365 days)        Color   Clarity   Blood   Leuk Est   Nitrite   Protein   CREAT   Urine HCG        12/21/23 1154 Yellow   Clear   3+   Negative   Negative   Trace                   Urine Culture          12/21/2023    10:47 12/27/2023    15:03   Urine Culture   Urine Culture 50,000 CFU/mL Mixed Ava Isolated  No growth         Lab Results   Component Value Date    GLUCOSE 120 (H) 06/23/2023    CALCIUM 9.6 06/23/2023     06/23/2023    K 3.9 06/23/2023    CO2 25.0 06/23/2023     (H) 06/23/2023    BUN 7 06/23/2023    CREATININE 0.65 06/23/2023    EGFRIFAFRI 135 06/02/2021    EGFRIFNONA 117 06/02/2021    BCR 10.8 06/23/2023    ANIONGAP 11.0 06/23/2023       Lab Results   Component Value Date    WBC 7.00 12/06/2023    HGB 13.5 12/06/2023    HCT 39.9 12/06/2023    MCV 87.1 12/06/2023     12/06/2023       Images:   XR Spine Lumbar Complete 4+VW    Result Date: 12/23/2023  Impression: 1.4 nonrib-bearing lumbar vertebra. 2.Facet arthropathy lower lumbar spine. Electronically Signed: Niraj Ash MD  12/23/2023 2:02 PM EST  Workstation ID: [id number]    US Pelvis Complete    Result Date: 12/21/2023  Impression: Unremarkable pelvic sonogram Electronically Signed: Sulaiman Dooley MD  12/21/2023 3:56 PM EST  Workstation ID: [id number]      Measures:   Tobacco:   Yasmin Singleton  reports that she has been smoking cigarettes. She started smoking about 18 years ago. She has a 6.00 pack-year smoking history. She has been exposed to tobacco smoke. She has never used smokeless tobacco. I have educated her on the risk of diseases from using tobacco products such as cancer, COPD, and heart disease.     I advised her to quit and she is willing to quit  and will continue to work towards this goal on her own.    I spent 3  minutes counseling the patient.      Assessment / Plan      Assessment/Plan:   38 y.o. female who presented today following two days of gross hematuria, which has since resolved. She was treated for a UTI around this time, with a urine culture growing 50k MUGF. We discussed possible etiologies, relevant work-up and diagnostic approach for gross hematuria. Given gross hematuria and smoking history, she is at higher risk of urologic cancers and warrants work up. We discussed completing a CT urogram and office cystoscopy for complete evaluation of the urinary tract, and we will schedule her for these tests.    Diagnoses and all orders for this visit:    1. Macroscopic hematuria (Primary)  -     Cancel: Urinalysis With Microscopic - Urine, Clean Catch; Future  -     CT Abdomen Pelvis With & Without Contrast; Future         Follow Up:   Return in about 4 weeks (around 1/30/2024) for Cystoscopy.    I spent approximately 45 minutes providing clinical care for this patient; including review of patient's chart and provider documentation, face to face time spent with patient in examination room (obtaining history, performing physical exam, discussing diagnosis and management options), placing orders, and completing patient documentation.     Deon Dunbar MD  [facility]

## 2024-01-22 ENCOUNTER — TELEPHONE (OUTPATIENT)
Dept: UROLOGY | Facility: CLINIC | Age: 39
End: 2024-01-22
Payer: COMMERCIAL

## 2024-01-22 NOTE — TELEPHONE ENCOUNTER
Caller: Yasmin Singleton    Relationship: Self    Best call back number: [phone]    What is the best time to reach you: ANY    Who are you requesting to speak with (clinical staff, provider,  specific staff member): ANITA ROBLERO    Do you know the name of the person who called: ANITA ROBLERO    What was the call regarding: UNKNOWN

## 2024-02-02 ENCOUNTER — LAB (OUTPATIENT)
Dept: INTERNAL MEDICINE | Facility: CLINIC | Age: 39
End: 2024-02-02
Payer: COMMERCIAL

## 2024-02-02 ENCOUNTER — OFFICE VISIT (OUTPATIENT)
Dept: INTERNAL MEDICINE | Facility: CLINIC | Age: 39
End: 2024-02-02
Payer: COMMERCIAL

## 2024-02-02 VITALS
SYSTOLIC BLOOD PRESSURE: 118 MMHG | HEART RATE: 84 BPM | DIASTOLIC BLOOD PRESSURE: 66 MMHG | OXYGEN SATURATION: 98 % | WEIGHT: 202.4 LBS | TEMPERATURE: 97.7 F | HEIGHT: 63 IN | BODY MASS INDEX: 35.86 KG/M2

## 2024-02-02 DIAGNOSIS — Z00.00 ANNUAL PHYSICAL EXAM: ICD-10-CM

## 2024-02-02 DIAGNOSIS — M54.42 CHRONIC MIDLINE LOW BACK PAIN WITH BILATERAL SCIATICA: ICD-10-CM

## 2024-02-02 DIAGNOSIS — G89.29 CHRONIC MIDLINE LOW BACK PAIN WITH BILATERAL SCIATICA: ICD-10-CM

## 2024-02-02 DIAGNOSIS — Z00.00 ANNUAL PHYSICAL EXAM: Primary | ICD-10-CM

## 2024-02-02 DIAGNOSIS — M54.41 CHRONIC MIDLINE LOW BACK PAIN WITH BILATERAL SCIATICA: ICD-10-CM

## 2024-02-02 LAB
DEPRECATED RDW RBC AUTO: 43.7 FL (ref 37–54)
ERYTHROCYTE [DISTWIDTH] IN BLOOD BY AUTOMATED COUNT: 14.1 % (ref 12.3–15.4)
HCT VFR BLD AUTO: 39.1 % (ref 34–46.6)
HGB BLD-MCNC: 12.7 G/DL (ref 12–15.9)
MCH RBC QN AUTO: 27.7 PG (ref 26.6–33)
MCHC RBC AUTO-ENTMCNC: 32.5 G/DL (ref 31.5–35.7)
MCV RBC AUTO: 85.4 FL (ref 79–97)
PLATELET # BLD AUTO: 275 10*3/MM3 (ref 140–450)
PMV BLD AUTO: 9.6 FL (ref 6–12)
RBC # BLD AUTO: 4.58 10*6/MM3 (ref 3.77–5.28)
WBC NRBC COR # BLD AUTO: 6.39 10*3/MM3 (ref 3.4–10.8)

## 2024-02-02 PROCEDURE — 81374 HLA I TYPING 1 ANTIGEN LR: CPT | Performed by: PHYSICIAN ASSISTANT

## 2024-02-02 PROCEDURE — 83036 HEMOGLOBIN GLYCOSYLATED A1C: CPT | Performed by: PHYSICIAN ASSISTANT

## 2024-02-02 PROCEDURE — 36415 COLL VENOUS BLD VENIPUNCTURE: CPT | Performed by: PHYSICIAN ASSISTANT

## 2024-02-02 PROCEDURE — 80061 LIPID PANEL: CPT | Performed by: PHYSICIAN ASSISTANT

## 2024-02-02 PROCEDURE — 80050 GENERAL HEALTH PANEL: CPT | Performed by: PHYSICIAN ASSISTANT

## 2024-02-02 NOTE — PROGRESS NOTES
[facility]  [address]  [facility] [address]  Dept: [phone]  Dept Fax: [phone]  Loc: [phone]  Loc Fax: [phone]    Yasmin Singleton  1985    Follow Up Office Visit Note    History of Present Illness:  Patient is a 38-year-old female in today for annual physical and to follow-up for low back pain.  Patient has been undergoing physical therapy and due to have some more follow-up visits.  Overall back pain is doing somewhat better but has been shifted from 1 side to the other now.  Still having low back pain.        The following portions of the patient's history were reviewed and updated as appropriate: allergies, current medications, past family history, past medical history, past social history, past surgical history, and problem list.    Medications:    Current Outpatient Medications:     Boric Acid suppository Suppository, Insert 1 suppository into the vagina Every Night., Disp: 21 suppository, Rfl: 0    busPIRone (BUSPAR) 10 MG tablet, Take 1 tablet PO 2-3 times a day, Disp: 90 tablet, Rfl: 3    famotidine (PEPCID) 20 MG tablet, TAKE ONE TABLET BY MOUTH TWICE A DAY AS NEEDED FOR HEARTBURN, Disp: 180 tablet, Rfl: 2    glucose blood test strip, 1 each by Other route 2 (Two) Times a Day With Meals. Use as instructed, Disp: 100 each, Rfl: 5    Lancets (freestyle) lancets, 1 each by Other route Daily. Use as instructed, Disp: 100 each, Rfl: 5    levothyroxine (SYNTHROID, LEVOTHROID) 100 MCG tablet, Take 1 tablet by mouth Daily., Disp: , Rfl:     loratadine (CLARITIN) 10 MG tablet, Take 1 tablet by mouth Daily., Disp: 30 tablet, Rfl: 5    metFORMIN ER (GLUCOPHAGE-XR) 750 MG 24 hr tablet, TAKE ONE TABLET BY MOUTH DAILY WITH BREAKFAST, Disp: 90 tablet, Rfl: 1    Prenatal Vit-Fe Fumarate-FA (Prenatal 27-1) 27-1 MG tablet tablet, Take 1 tablet by mouth Daily., Disp: 90 each, Rfl: 2    spironolactone (ALDACTONE) 50 MG tablet, , Disp: ,  Rfl:     vitamin D (ERGOCALCIFEROL) 1.25 MG (17837 UT) capsule capsule, TAKE ONE CAPSULE BY MOUTH ONCE WEEKLY, Disp: 12 capsule, Rfl: 1    Subjective  No Known Allergies     Past Medical History:   Diagnosis Date    Diabetes     GERD (gastroesophageal reflux disease)     History of recurrent UTIs     Seasonal allergies        Past Surgical History:   Procedure Laterality Date    WISDOM TOOTH EXTRACTION  2015       Family History   Problem Relation Age of Onset    Kidney failure Mother     Cancer Father         Unsure what kind    Hypertension Father     Stroke Father     Breast cancer Neg Hx     Ovarian cancer Neg Hx         Social History     Socioeconomic History    Marital status: Single   Tobacco Use    Smoking status: Every Day     Packs/day: 0.50     Years: 12.00     Additional pack years: 0.00     Total pack years: 6.00     Types: Cigarettes     Start date: 2006     Passive exposure: Past    Smokeless tobacco: Never   Vaping Use    Vaping Use: Never used   Substance and Sexual Activity    Alcohol use: Yes     Comment: on occasion, at most 1-2 drinks a month    Drug use: Yes     Frequency: 4.0 times per week     Types: Marijuana    Sexual activity: Yes     Partners: Male     Birth control/protection: None       Review of Systems   Constitutional:  Negative for activity change, chills, fatigue, fever and unexpected weight change.   HENT:  Negative for congestion, ear pain, postnasal drip, sinus pressure and sore throat.    Eyes:  Negative for pain, discharge and redness.   Respiratory:  Negative for cough, shortness of breath and wheezing.    Cardiovascular:  Negative for chest pain, palpitations and leg swelling.   Gastrointestinal:  Negative for diarrhea, nausea and vomiting.   Endocrine: Negative for cold intolerance and heat intolerance.   Genitourinary:  Negative for decreased urine volume and dysuria.   Musculoskeletal:  Positive for arthralgias, back pain and myalgias.   Skin:  Negative for rash and  "wound.   Neurological:  Negative for dizziness, light-headedness and headaches.   Hematological:  Does not bruise/bleed easily.   Psychiatric/Behavioral:  Negative for confusion, dysphoric mood and sleep disturbance. The patient is not nervous/anxious.          Objective  Vitals:    02/02/24 1336   BP: 118/66   BP Location: Left arm   Patient Position: Sitting   Cuff Size: Adult   Pulse: 84   Temp: 97.7 °F (36.5 °C)   TempSrc: Temporal   SpO2: 98%   Weight: 91.8 kg (202 lb 6.4 oz)   Height: 160 cm (62.99\")     Body mass index is 35.86 kg/m².     Physical Exam  Physical Exam  Vitals and nursing note reviewed.   Constitutional:       General: She is not in acute distress.     Appearance: She is not ill-appearing.   HENT:      Head: Normocephalic.      Right Ear: Tympanic membrane, ear canal and external ear normal. There is no impacted cerumen.      Left Ear: Tympanic membrane, ear canal and external ear normal. There is no impacted cerumen.      Nose: No congestion or rhinorrhea.      Mouth/Throat:      Mouth: Mucous membranes are moist.      Pharynx: Oropharynx is clear. No oropharyngeal exudate or posterior oropharyngeal erythema.   Eyes:      General:         Right eye: No discharge.         Left eye: No discharge.      Extraocular Movements: Extraocular movements intact.      Conjunctiva/sclera: Conjunctivae normal.      Pupils: Pupils are equal, round, and reactive to light.   Cardiovascular:      Rate and Rhythm: Normal rate and regular rhythm.      Heart sounds: Normal heart sounds. No murmur heard.     No friction rub. No gallop.   Pulmonary:      Effort: Pulmonary effort is normal. No respiratory distress.      Breath sounds: Normal breath sounds. No wheezing.   Abdominal:      General: Bowel sounds are normal. There is no distension.      Palpations: Abdomen is soft. There is no mass.      Tenderness: There is no abdominal tenderness.   Musculoskeletal:         General: No swelling. Normal range of motion. "      Cervical back: Normal range of motion. No tenderness.      Right lower leg: No edema.      Left lower leg: No edema.   Lymphadenopathy:      Cervical: No cervical adenopathy.   Skin:     Findings: No bruising, erythema or rash.   Neurological:      Mental Status: She is oriented to person, place, and time.      Gait: Gait normal.   Psychiatric:         Mood and Affect: Mood normal.         Behavior: Behavior normal.         Thought Content: Thought content normal.         Judgment: Judgment normal.         Diagnostic Data  Procedures    Assessment  Diagnoses and all orders for this visit:    1. Annual physical exam (Primary)  -     Cancel: CBC (No Diff)  -     Cancel: Comprehensive Metabolic Panel  -     TSH Rfx On Abnormal To Free T4  -     Hemoglobin A1c; Future  -     Lipid Panel    2. Chronic midline low back pain with bilateral sciatica  -     XR Pelvis 1 or 2 View; Future  -     HLA-B27 Antigen; Future        Plan    1. Annual physical exam (Primary)- obtain fasting labs and follow-up with these.  Advised on nutrition and exercise and follow-up with this.  Diabetic foot exam normal today.    2. Chronic midline low back pain with bilateral sciatica- changing, obtain x-ray of pelvis and HLA-B27.      Return in about 4 weeks (around 3/1/2024) for Recheck.    Gerson Sifuentes PA-C  02/02/2024

## 2024-02-03 LAB
ALBUMIN SERPL-MCNC: 4.3 G/DL (ref 3.5–5.2)
ALBUMIN/GLOB SERPL: 1.7 G/DL
ALP SERPL-CCNC: 63 U/L (ref 39–117)
ALT SERPL W P-5'-P-CCNC: 22 U/L (ref 1–33)
ANION GAP SERPL CALCULATED.3IONS-SCNC: 12 MMOL/L (ref 5–15)
AST SERPL-CCNC: 21 U/L (ref 1–32)
BILIRUB SERPL-MCNC: 0.3 MG/DL (ref 0–1.2)
BUN SERPL-MCNC: 6 MG/DL (ref 6–20)
BUN/CREAT SERPL: 8.8 (ref 7–25)
CALCIUM SPEC-SCNC: 9.2 MG/DL (ref 8.6–10.5)
CHLORIDE SERPL-SCNC: 106 MMOL/L (ref 98–107)
CHOLEST SERPL-MCNC: 151 MG/DL (ref 0–200)
CO2 SERPL-SCNC: 23 MMOL/L (ref 22–29)
CREAT SERPL-MCNC: 0.68 MG/DL (ref 0.57–1)
EGFRCR SERPLBLD CKD-EPI 2021: 115.2 ML/MIN/1.73
GLOBULIN UR ELPH-MCNC: 2.6 GM/DL
GLUCOSE SERPL-MCNC: 118 MG/DL (ref 65–99)
HBA1C MFR BLD: 7.5 % (ref 4.8–5.6)
HDLC SERPL-MCNC: 41 MG/DL (ref 40–60)
LDLC SERPL CALC-MCNC: 96 MG/DL (ref 0–100)
LDLC/HDLC SERPL: 2.32 {RATIO}
POTASSIUM SERPL-SCNC: 3.8 MMOL/L (ref 3.5–5.2)
PROT SERPL-MCNC: 6.9 G/DL (ref 6–8.5)
SODIUM SERPL-SCNC: 141 MMOL/L (ref 136–145)
TRIGL SERPL-MCNC: 74 MG/DL (ref 0–150)
TSH SERPL DL<=0.05 MIU/L-ACNC: 1.14 UIU/ML (ref 0.27–4.2)
VLDLC SERPL-MCNC: 14 MG/DL (ref 5–40)

## 2024-02-05 ENCOUNTER — TELEPHONE (OUTPATIENT)
Dept: INTERNAL MEDICINE | Facility: CLINIC | Age: 39
End: 2024-02-05
Payer: COMMERCIAL

## 2024-02-05 RX ORDER — GLIPIZIDE 5 MG/1
5 TABLET ORAL
Qty: 180 TABLET | Refills: 1 | Status: SHIPPED | OUTPATIENT
Start: 2024-02-05

## 2024-02-05 NOTE — TELEPHONE ENCOUNTER
LVM for pt to return call.     OK FOR HUB TO RELAY MESSAGE  ----- Message from Gerson Sifuentes PA-C sent at 2/5/2024 10:14 AM EST -----  A1c worse, recommend adding glipizide daily to metformin.  If patient agreeable I can send in the prescription for her.  Thank you.

## 2024-02-05 NOTE — TELEPHONE ENCOUNTER
LVM for pt to return call.      OK FOR HUB TO RELAY MESSAGE  ----- Message from Gerson Sifuentes PA-C sent at 2/5/2024 10:14 AM EST -----  A1c worse, recommend adding glipizide daily to metformin.  If patient agreeable I can send in the prescription for her.  Thank you.            Name: Yasmin Singleton    Relationship: Self    Best Callback Number: [phone]     HUB PROVIDED THE RELAY MESSAGE FROM THE OFFICE   PATIENT VOICED UNDERSTANDING AND HAS NO FURTHER QUESTIONS AT THIS TIME.  PATIENT AGREEABLE TO ADDING GLIPIZIDE.      PHARMACY:  [facility].      ADDITIONAL INFORMATION:

## 2024-02-09 ENCOUNTER — HOSPITAL ENCOUNTER (OUTPATIENT)
Dept: CT IMAGING | Facility: HOSPITAL | Age: 39
Discharge: HOME | End: 2024-02-09
Admitting: UROLOGY
Payer: COMMERCIAL

## 2024-02-09 DIAGNOSIS — R31.0 MACROSCOPIC HEMATURIA: ICD-10-CM

## 2024-02-09 LAB — HLA-B27 QL NAA+PROBE: NEGATIVE

## 2024-02-09 PROCEDURE — 25510000001 IOPAMIDOL PER 1 ML: Performed by: UROLOGY

## 2024-02-09 PROCEDURE — 74178 CT ABD&PLV WO CNTR FLWD CNTR: CPT

## 2024-02-09 RX ADMIN — IOPAMIDOL 150 ML: 755 INJECTION, SOLUTION INTRAVENOUS at 15:59

## 2024-02-14 ENCOUNTER — PROCEDURE VISIT (OUTPATIENT)
Dept: UROLOGY | Facility: CLINIC | Age: 39
End: 2024-02-14
Payer: COMMERCIAL

## 2024-02-14 DIAGNOSIS — N13.2 URETERAL STONE WITH HYDRONEPHROSIS: Primary | ICD-10-CM

## 2024-02-14 DIAGNOSIS — R31.21 ASYMPTOMATIC MICROSCOPIC HEMATURIA: ICD-10-CM

## 2024-02-15 PROBLEM — N13.2 URETERAL STONE WITH HYDRONEPHROSIS: Status: ACTIVE | Noted: 2024-02-15

## 2024-02-15 PROBLEM — R31.0 GROSS HEMATURIA: Status: ACTIVE | Noted: 2024-02-15

## 2024-02-22 ENCOUNTER — DOCUMENTATION (OUTPATIENT)
Dept: UROLOGY | Facility: CLINIC | Age: 39
End: 2024-02-22

## 2024-02-22 ENCOUNTER — OUTSIDE FACILITY SERVICE (OUTPATIENT)
Dept: UROLOGY | Facility: CLINIC | Age: 39
End: 2024-02-22
Payer: COMMERCIAL

## 2024-02-22 DIAGNOSIS — N13.2 URETERAL STONE WITH HYDRONEPHROSIS: Primary | ICD-10-CM

## 2024-02-22 RX ORDER — NITROFURANTOIN 25; 75 MG/1; MG/1
100 CAPSULE ORAL 2 TIMES DAILY
Qty: 14 CAPSULE | Refills: 0 | Status: SHIPPED | OUTPATIENT
Start: 2024-02-22

## 2024-02-22 RX ORDER — TAMSULOSIN HYDROCHLORIDE 0.4 MG/1
1 CAPSULE ORAL DAILY
Qty: 14 CAPSULE | Refills: 0 | Status: SHIPPED | OUTPATIENT
Start: 2024-02-22 | End: 2024-03-07

## 2024-02-22 RX ORDER — OXYBUTYNIN CHLORIDE 5 MG/1
5 TABLET, EXTENDED RELEASE ORAL DAILY
Qty: 14 TABLET | Refills: 0 | Status: SHIPPED | OUTPATIENT
Start: 2024-02-22

## 2024-02-22 RX ORDER — PHENAZOPYRIDINE HYDROCHLORIDE 200 MG/1
200 TABLET, FILM COATED ORAL 3 TIMES DAILY PRN
Qty: 20 TABLET | Refills: 0 | Status: SHIPPED | OUTPATIENT
Start: 2024-02-22

## 2024-02-22 RX ORDER — KETOROLAC TROMETHAMINE 10 MG/1
10 TABLET, FILM COATED ORAL EVERY 6 HOURS PRN
Qty: 15 TABLET | Refills: 0 | Status: SHIPPED | OUTPATIENT
Start: 2024-02-22

## 2024-02-22 NOTE — PROGRESS NOTES
[facility]   [address] [place] [id number]      OPERATIVE REPORT     Patient Name:  Yasmin Singleton  YOB: 1985    Patient MRN: [id number]    Date of Surgery:  2/22/24     Indications: 38-year-old female with 5 mm right proximal ureteral stone.  She presents today for ureteroscopy and laser lithotripsy with stent placement.  Risks benefits and alternatives discussed and elects to proceed    Pre-op Diagnosis:   Right ureteral stone    Post-op Diagnosis:   Right ureteral stone    Procedures Performed:  CYSTOSCOPY  RIGHT URETEROSCOPY LASER LITHOTRIPSY, BASKET EXTRACTION  RIGHT RETROGRADE PYELOGRAM  RIGHT URETERAL  STENT PLACEMENT    Staff:  Deon Dunbar MD    Anesthesia: General    Estimated Blood Loss: Minimal    Implants:  4.8F x 24cm ureteral stent    Findings:   Normal urinary bladder.  5 mm mid right ureteral stone.  Laser lithotripsy with fragmentation of stone, basket removal of fragments.  Right pyeloscopy with clearance of kidney.  Right retrograde pyelography with contrast seen filling mildly dilated collecting system  Right ureteral stent placement    Complications: None immediate    Description of Procedure:    After informed consent, the patient was brought back to the operating suite and moved over to the operating table. General anesthesia was smoothly induced, IV antibiotics were administered, and the patient was placed in the dorsal lithotomy position with careful attention focused on padding all pressure points. The patient was prepped and draped in standard fashion. A timeout was performed to ensure the correct patient and procedure    A 22Fr cystoscope was used to cannulate the urethra. The urethra was of normal course and caliber.  Upon entering the bladder, pan-cystoscopy revealed no bladder abnormalities.  The bilateral ureteral orifices were visualized in their orthotopic positions.       A sensor wire was advanced up the right ureter and into the  collecting system on fluoroscopy to serve as a safety wire which was clamped to the surgical drapes.     SEMIRIGID URETEROSCOPY  The semi-rigid ureteroscope was then advanced into the bladder. The right ureter was cannulated demonstrating 5 mm mid right ureteral stone.  A 200 µm fiber was passed.  The stone was fragmented.  1.9 basket was inserted and fragments removed.    NO ACCESS SHEATH  We then switched to pressure-bag irrigation and a flexible ureteroscope was advanced over the second guidewire into the renal pelvis under live fluoroscopy and the second guidewire was removed. Pan-pyeloscopy was then performed which revealed no evidence of remaining stone. The ureter was inspected as the flexible ureteroscope was removed and found to be free of any injuries or stone.        RIGHT Retrograde Pyelogram Interpretation  A retrograde pyelogram was performed demonstrating normal course and caliber of the ureter, outlining the location of the renal pelvis and collecting system, with mildly dilated collecting system.     CYSTO PLACEMENT  We switched back to the rigid cystoscope which was reinserted over the existing guidewire. A 4.8F x 24cm double J ureteral stent was advanced up the right ureter under direct visualization which confirmed good curl in the bladder. Fluoroscopy confirmed good curl in the kidney. The bladder was drained and this concluded our procedure.      The patient was brought back to the PACU in stable condition. All scopes and instruments were in good working order at the end of the case. There were no complications.    Disposition/Follow Up: Patient to be discharged when meeting PACU criteria.  Patient will follow-up in 5 to 7 days for cystoscopy and stent removal.    Deon Dunbar MD     Date: 2/22/2024  Time: 08:49 EST

## 2024-02-24 ENCOUNTER — TELEPHONE (OUTPATIENT)
Dept: UROLOGY | Facility: CLINIC | Age: 39
End: 2024-02-24
Payer: COMMERCIAL

## 2024-02-24 DIAGNOSIS — R11.2 NAUSEA AND VOMITING, UNSPECIFIED VOMITING TYPE: Primary | ICD-10-CM

## 2024-02-24 RX ORDER — ONDANSETRON 4 MG/1
4 TABLET, ORALLY DISINTEGRATING ORAL EVERY 8 HOURS PRN
Qty: 30 TABLET | Refills: 1 | Status: SHIPPED | OUTPATIENT
Start: 2024-02-24

## 2024-02-24 NOTE — TELEPHONE ENCOUNTER
Patient underwent ureteroscopy and lithotripsy with Dr. Dunbar on Thursday last week. She has a stent in place. She calls in today experiencing persistent nausea and vomiting, states she is vomiting consistently every 2 or 2-1/2 hours.  She denies pain, specifically no flank or abdominal pain, no fevers, chills or other systemic symptoms.  She is on multiple medications including Toradol, Macrobid, Ditropan, Pyridium, Flomax.  Discussed the possibility of medication causing her nausea and I would recommend she discontinue her medications for the next day or so and see if her symptoms improve.  I will also send her Zofran for her symptoms.  Discussed if her vomiting continues or if she is unable to stay hydrated I would recommend she come to the emergency department to be evaluated and possibly admitted.  Patient reports understanding.  Return precautions discussed.    Néstor Arteaga MD

## 2024-02-28 ENCOUNTER — PROCEDURE VISIT (OUTPATIENT)
Dept: UROLOGY | Facility: CLINIC | Age: 39
End: 2024-02-28
Payer: COMMERCIAL

## 2024-02-28 VITALS — BODY MASS INDEX: 35.79 KG/M2 | OXYGEN SATURATION: 98 % | WEIGHT: 202 LBS | HEIGHT: 63 IN | HEART RATE: 90 BPM

## 2024-02-28 DIAGNOSIS — N20.0 NEPHROLITHIASIS: Primary | ICD-10-CM

## 2024-02-28 NOTE — PROGRESS NOTES
Procedure: Flexible Cystoscopy with Stent Removal     Preoperative Diagnosis: Right Ureteral Stone    Postoperative Diagnosis: Right Ureteral Stone    Procedure performed: Cystoscopy with ureteral stent removal     Surgeon: Deon Dunbar MD    Anesthesia: 2% Lidocaine Jelly    Indications: Yasmin Singleton is a 38 y.o. year old female with a history of right ureteral stone who underwent definitive stone treatment. A ureteral stent was left in place. The patient returns for planned ureteral stent removal today.     Procedure: Patient was taken to the urology procedure suite and prepped and draped in sterile fashion. A pre-procedural identification was performed. 10 cc of 2% lidocaine jelly was injected into the urethra. After adequate anesthesia, a lubricated flexible cystoscope was inserted into the urethra. The urethra appeared normal. The urinary sphincter was intact. The bladder was entered and 360 degree panendoscopy was performed revealing normal bladder anatomy, bilateral orthotopic ureteral orifices, and no concern for bladder stones, trabeculations, mucosal lesions/tumors, or divetrticuli. The ureteral stent was in good position emanating from the ureteral orifice.      The ureteral stent was grasped with a pair of grasping forceps and was removed without difficulty. The stent was removed intact.     PLAN    1) Discharge home    2) Follow up: 4 weeks US

## 2024-02-29 PROBLEM — N20.0 NEPHROLITHIASIS: Status: ACTIVE | Noted: 2024-02-29

## 2024-03-01 ENCOUNTER — OFFICE VISIT (OUTPATIENT)
Dept: INTERNAL MEDICINE | Facility: CLINIC | Age: 39
End: 2024-03-01
Payer: COMMERCIAL

## 2024-03-01 VITALS
HEIGHT: 63 IN | OXYGEN SATURATION: 98 % | DIASTOLIC BLOOD PRESSURE: 84 MMHG | HEART RATE: 98 BPM | WEIGHT: 201.4 LBS | BODY MASS INDEX: 35.68 KG/M2 | SYSTOLIC BLOOD PRESSURE: 120 MMHG | TEMPERATURE: 97.3 F

## 2024-03-01 DIAGNOSIS — F41.1 GAD (GENERALIZED ANXIETY DISORDER): Primary | ICD-10-CM

## 2024-03-01 DIAGNOSIS — M54.42 CHRONIC MIDLINE LOW BACK PAIN WITH BILATERAL SCIATICA: Primary | ICD-10-CM

## 2024-03-01 DIAGNOSIS — E11.9 TYPE 2 DIABETES MELLITUS WITHOUT COMPLICATION, WITHOUT LONG-TERM CURRENT USE OF INSULIN: ICD-10-CM

## 2024-03-01 DIAGNOSIS — M54.41 CHRONIC MIDLINE LOW BACK PAIN WITH BILATERAL SCIATICA: Primary | ICD-10-CM

## 2024-03-01 DIAGNOSIS — G89.29 CHRONIC MIDLINE LOW BACK PAIN WITH BILATERAL SCIATICA: Primary | ICD-10-CM

## 2024-03-01 DIAGNOSIS — F41.1 GAD (GENERALIZED ANXIETY DISORDER): ICD-10-CM

## 2024-03-01 DIAGNOSIS — K21.9 GASTROESOPHAGEAL REFLUX DISEASE WITHOUT ESOPHAGITIS: ICD-10-CM

## 2024-03-01 RX ORDER — PANTOPRAZOLE SODIUM 20 MG/1
20 TABLET, DELAYED RELEASE ORAL DAILY
Qty: 90 TABLET | Refills: 1 | Status: SHIPPED | OUTPATIENT
Start: 2024-03-01

## 2024-03-01 RX ORDER — ESCITALOPRAM OXALATE 5 MG/1
5 TABLET ORAL DAILY
Qty: 30 TABLET | Refills: 5 | Status: SHIPPED | OUTPATIENT
Start: 2024-03-01

## 2024-03-01 NOTE — PROGRESS NOTES
[facility]  [address]  [facility] [address]  Dept: [phone]  Dept Fax: [phone]  Loc: [phone]  Loc Fax: [phone]    Yasmin Singleton  1985    Follow Up Office Visit Note    History of Present Illness:  Patient is a 38-year-old female in today to follow-up for low back pain, diabetes, GERD, and anxiety.  Patient was taking BuSpar for anxiety but was not helping.  Would like to try something different.  Open to GeneSight testing.  Denies any SI or HI.    Back pain doing much better since kidney stones have resolved.  Still seeing urology for follow-up.    Was taking famotidine for acid reflux but not really helping.  Would like to try something different.    Patient on metformin extended release daily.  Recent A1c results being increased reviewed with patient in office today.  Patient agreeable to starting glipizide 5 mg twice daily.    Patient overall doing well and feeling well.        The following portions of the patient's history were reviewed and updated as appropriate: allergies, current medications, past family history, past medical history, past social history, past surgical history, and problem list.    Medications:    Current Outpatient Medications:     Boric Acid suppository Suppository, Insert 1 suppository into the vagina Every Night., Disp: 21 suppository, Rfl: 0    glipizide (Glucotrol) 5 MG tablet, Take 1 tablet by mouth 2 (Two) Times a Day Before Meals., Disp: 180 tablet, Rfl: 1    glucose blood test strip, 1 each by Other route 2 (Two) Times a Day With Meals. Use as instructed, Disp: 100 each, Rfl: 5    ketorolac (TORADOL) 10 MG tablet, Take 1 tablet by mouth Every 6 (Six) Hours As Needed for Moderate Pain., Disp: 15 tablet, Rfl: 0    Lancets (freestyle) lancets, 1 each by Other route Daily. Use as instructed, Disp: 100 each, Rfl: 5    levothyroxine (SYNTHROID, LEVOTHROID) 100 MCG tablet, Take 1 tablet by mouth  Daily., Disp: , Rfl:     loratadine (CLARITIN) 10 MG tablet, Take 1 tablet by mouth Daily., Disp: 30 tablet, Rfl: 5    metFORMIN ER (GLUCOPHAGE-XR) 750 MG 24 hr tablet, TAKE ONE TABLET BY MOUTH DAILY WITH BREAKFAST, Disp: 90 tablet, Rfl: 1    ondansetron ODT (ZOFRAN-ODT) 4 MG disintegrating tablet, Place 1 tablet on the tongue Every 8 (Eight) Hours As Needed for Nausea or Vomiting., Disp: 30 tablet, Rfl: 1    oxybutynin XL (DITROPAN-XL) 5 MG 24 hr tablet, Take 1 tablet by mouth Daily. PRN BLADDER SPASM, Disp: 14 tablet, Rfl: 0    phenazopyridine (Pyridium) 200 MG tablet, Take 1 tablet by mouth 3 (Three) Times a Day As Needed for Bladder Spasms., Disp: 20 tablet, Rfl: 0    Prenatal Vit-Fe Fumarate-FA (Prenatal 27-1) 27-1 MG tablet tablet, Take 1 tablet by mouth Daily., Disp: 90 each, Rfl: 2    spironolactone (ALDACTONE) 50 MG tablet, , Disp: , Rfl:     tamsulosin (FLOMAX) 0.4 MG capsule 24 hr capsule, Take 1 capsule by mouth Daily for 14 days., Disp: 14 capsule, Rfl: 0    vitamin D (ERGOCALCIFEROL) 1.25 MG (29454 UT) capsule capsule, TAKE ONE CAPSULE BY MOUTH ONCE WEEKLY, Disp: 12 capsule, Rfl: 1    escitalopram (Lexapro) 5 MG tablet, Take 1 tablet by mouth Daily., Disp: 30 tablet, Rfl: 5    pantoprazole (PROTONIX) 20 MG EC tablet, Take 1 tablet by mouth Daily., Disp: 90 tablet, Rfl: 1    Subjective  No Known Allergies     Past Medical History:   Diagnosis Date    Diabetes     GERD (gastroesophageal reflux disease)     History of recurrent UTIs     Seasonal allergies        Past Surgical History:   Procedure Laterality Date    KIDNEY STONE SURGERY  02/22/2024    WISDOM TOOTH EXTRACTION  2015       Family History   Problem Relation Age of Onset    Kidney failure Mother     Cancer Father         Unsure what kind    Hypertension Father     Stroke Father     Breast cancer Neg Hx     Ovarian cancer Neg Hx         Social History     Socioeconomic History    Marital status: Single   Tobacco Use    Smoking status: Every Day  "    Packs/day: 0.50     Years: 12.00     Additional pack years: 0.00     Total pack years: 6.00     Types: Cigarettes     Start date: 2006     Passive exposure: Past    Smokeless tobacco: Never   Vaping Use    Vaping Use: Never used   Substance and Sexual Activity    Alcohol use: Yes     Comment: on occasion, at most 1-2 drinks a month    Drug use: Yes     Frequency: 4.0 times per week     Types: Marijuana    Sexual activity: Yes     Partners: Male     Birth control/protection: None       Review of Systems   Constitutional:  Negative for activity change, chills, fatigue, fever and unexpected weight change.   HENT:  Negative for congestion, ear pain, postnasal drip, sinus pressure and sore throat.    Eyes:  Negative for pain, discharge and redness.   Respiratory:  Negative for cough, shortness of breath and wheezing.    Cardiovascular:  Negative for chest pain, palpitations and leg swelling.   Gastrointestinal:  Negative for diarrhea, nausea and vomiting.   Endocrine: Negative for cold intolerance and heat intolerance.   Genitourinary:  Negative for decreased urine volume and dysuria.   Musculoskeletal:  Negative for arthralgias and myalgias.   Skin:  Negative for rash and wound.   Neurological:  Negative for dizziness, light-headedness and headaches.   Hematological:  Does not bruise/bleed easily.   Psychiatric/Behavioral:  Negative for confusion, dysphoric mood, self-injury, sleep disturbance and suicidal ideas. The patient is nervous/anxious.          Objective  Vitals:    03/01/24 1054   BP: 120/84   BP Location: Left arm   Patient Position: Sitting   Cuff Size: Large Adult   Pulse: 98   Temp: 97.3 °F (36.3 °C)   TempSrc: Temporal   SpO2: 98%   Weight: 91.4 kg (201 lb 6.4 oz)   Height: 160 cm (62.99\")     Body mass index is 35.69 kg/m².     Physical Exam  Physical Exam  Vitals and nursing note reviewed.   Constitutional:       General: She is not in acute distress.     Appearance: She is not ill-appearing. "   HENT:      Head: Normocephalic.      Right Ear: Tympanic membrane, ear canal and external ear normal. There is no impacted cerumen.      Left Ear: Tympanic membrane, ear canal and external ear normal. There is no impacted cerumen.      Nose: No congestion or rhinorrhea.      Mouth/Throat:      Mouth: Mucous membranes are moist.      Pharynx: Oropharynx is clear. No oropharyngeal exudate or posterior oropharyngeal erythema.   Eyes:      General:         Right eye: No discharge.         Left eye: No discharge.      Extraocular Movements: Extraocular movements intact.      Conjunctiva/sclera: Conjunctivae normal.      Pupils: Pupils are equal, round, and reactive to light.   Cardiovascular:      Rate and Rhythm: Normal rate and regular rhythm.      Heart sounds: Normal heart sounds. No murmur heard.     No friction rub. No gallop.   Pulmonary:      Effort: Pulmonary effort is normal. No respiratory distress.      Breath sounds: Normal breath sounds. No wheezing.   Abdominal:      General: Bowel sounds are normal. There is no distension.      Palpations: Abdomen is soft. There is no mass.      Tenderness: There is no abdominal tenderness.   Musculoskeletal:         General: No swelling. Normal range of motion.      Cervical back: Normal range of motion. No tenderness.      Right lower leg: No edema.      Left lower leg: No edema.   Lymphadenopathy:      Cervical: No cervical adenopathy.   Skin:     Findings: No bruising, erythema or rash.   Neurological:      Mental Status: She is oriented to person, place, and time.      Gait: Gait normal.   Psychiatric:         Mood and Affect: Mood normal.         Behavior: Behavior normal.         Thought Content: Thought content normal.         Judgment: Judgment normal.         Diagnostic Data  Procedures    Assessment  Diagnoses and all orders for this visit:    1. Chronic midline low back pain with bilateral sciatica (Primary)    2. Type 2 diabetes mellitus without  complication, without long-term current use of insulin    3. Gastroesophageal reflux disease without esophagitis    4. JACKIE (generalized anxiety disorder)  -     GeneSight - Swab,; Future    Other orders  -     pantoprazole (PROTONIX) 20 MG EC tablet; Take 1 tablet by mouth Daily.  Dispense: 90 tablet; Refill: 1  -     escitalopram (Lexapro) 5 MG tablet; Take 1 tablet by mouth Daily.  Dispense: 30 tablet; Refill: 5        Plan    1. Chronic midline low back pain with bilateral sciatica (Primary)- overall better since resolution of stones.  Continue to monitor.    2. Type 2 diabetes mellitus without complication, without long-term current use of insulin- on metformin.  Add glipizide 5 mg twice daily to current regimen.  Advised to continue to monitor blood sugars at home 3 times daily.  Advised if higher than 400 or lower than 60 to call 911. Patient verbalized understanding of all instructions given and complied.    3. Gastroesophageal reflux disease without esophagitis- worse, start Protonix 20 mg daily.    4. JACKIE (generalized anxiety disorder)- start trial of Lexapro 5 mg daily.  Advised for any side effects to call back immediately.  Ordered [facility] [employer].      Return in about 2 weeks (around 3/15/2024) for Recheck.    Gerson Sifuentes PA-C  03/01/2024

## 2024-03-03 DIAGNOSIS — N13.2 URETERAL STONE WITH HYDRONEPHROSIS: ICD-10-CM

## 2024-03-04 RX ORDER — OXYBUTYNIN CHLORIDE 5 MG/1
TABLET, EXTENDED RELEASE ORAL
Qty: 14 TABLET | Refills: 0 | Status: SHIPPED | OUTPATIENT
Start: 2024-03-04

## 2024-03-04 RX ORDER — TAMSULOSIN HYDROCHLORIDE 0.4 MG/1
1 CAPSULE ORAL DAILY
Qty: 14 CAPSULE | Refills: 0 | Status: SHIPPED | OUTPATIENT
Start: 2024-03-04 | End: 2024-03-18

## 2024-03-04 NOTE — TELEPHONE ENCOUNTER
Rx Refill Note  Requested Prescriptions     Pending Prescriptions Disp Refills    oxybutynin XL (DITROPAN-XL) 5 MG 24 hr tablet [Pharmacy Med Name: oxyBUTYnin CL ER 5 MG TABLET] 14 tablet 0     Sig: TAKE 1 TABLET BY MOUTH DAILY AS NEEDED FOR BLADDER SPASM    tamsulosin (FLOMAX) 0.4 MG capsule 24 hr capsule [Pharmacy Med Name: TAMSULOSIN HCL 0.4 MG CAPSULE] 14 capsule 0     Sig: TAKE 1 CAPSULE BY MOUTH DAILY FOR 14 DAYS      Last office visit with prescribing clinician: 1/2/2024   Last telemedicine visit with prescribing clinician: Visit date not found   Next office visit with prescribing clinician: 3/27/2024                         Would you like a call back once the refill request has been completed: [] Yes [] No    If the office needs to give you a call back, can they leave a voicemail: [] Yes [] No    Suellen France MA  03/04/24, 08:23 EST

## 2024-03-14 ENCOUNTER — OFFICE VISIT (OUTPATIENT)
Dept: INTERNAL MEDICINE | Facility: CLINIC | Age: 39
End: 2024-03-14
Payer: COMMERCIAL

## 2024-03-14 VITALS
SYSTOLIC BLOOD PRESSURE: 122 MMHG | DIASTOLIC BLOOD PRESSURE: 76 MMHG | HEART RATE: 90 BPM | WEIGHT: 200.6 LBS | HEIGHT: 63 IN | OXYGEN SATURATION: 98 % | TEMPERATURE: 97.1 F | BODY MASS INDEX: 35.54 KG/M2

## 2024-03-14 DIAGNOSIS — K21.9 GASTROESOPHAGEAL REFLUX DISEASE WITHOUT ESOPHAGITIS: ICD-10-CM

## 2024-03-14 DIAGNOSIS — F41.1 GAD (GENERALIZED ANXIETY DISORDER): Primary | ICD-10-CM

## 2024-03-14 DIAGNOSIS — E11.9 TYPE 2 DIABETES MELLITUS WITHOUT COMPLICATION, WITHOUT LONG-TERM CURRENT USE OF INSULIN: ICD-10-CM

## 2024-03-14 NOTE — PROGRESS NOTES
[facility]  [address]  [facility] [address]  Dept: [phone]  Dept Fax: [phone]  Loc: [phone]  Loc Fax: [phone]    Yasmin Singleton  1985    Follow Up Office Visit Note    History of Present Illness:  Patient is a 38-year-old female in today to follow-up for anxiety.  On Lexapro 5 mg daily.  Taking as directed with any problems or side effects.  Reports good symptomatic control.  Recent [employer] results reviewed with patient in office today.    Sugars doing good on glipizide in addition to metformin.    Using Protonix as needed.        The following portions of the patient's history were reviewed and updated as appropriate: allergies, current medications, past family history, past medical history, past social history, past surgical history, and problem list.    Medications:    Current Outpatient Medications:     Boric Acid suppository Suppository, Insert 1 suppository into the vagina Every Night., Disp: 21 suppository, Rfl: 0    escitalopram (Lexapro) 5 MG tablet, Take 1 tablet by mouth Daily., Disp: 30 tablet, Rfl: 5    glipizide (Glucotrol) 5 MG tablet, Take 1 tablet by mouth 2 (Two) Times a Day Before Meals., Disp: 180 tablet, Rfl: 1    glucose blood test strip, 1 each by Other route 2 (Two) Times a Day With Meals. Use as instructed, Disp: 100 each, Rfl: 5    ketorolac (TORADOL) 10 MG tablet, Take 1 tablet by mouth Every 6 (Six) Hours As Needed for Moderate Pain., Disp: 15 tablet, Rfl: 0    Lancets (freestyle) lancets, 1 each by Other route Daily. Use as instructed, Disp: 100 each, Rfl: 5    levothyroxine (SYNTHROID, LEVOTHROID) 100 MCG tablet, Take 1 tablet by mouth Daily., Disp: , Rfl:     loratadine (CLARITIN) 10 MG tablet, Take 1 tablet by mouth Daily., Disp: 30 tablet, Rfl: 5    metFORMIN ER (GLUCOPHAGE-XR) 750 MG 24 hr tablet, TAKE ONE TABLET BY MOUTH DAILY WITH BREAKFAST, Disp: 90 tablet, Rfl: 1    ondansetron ODT  (ZOFRAN-ODT) 4 MG disintegrating tablet, Place 1 tablet on the tongue Every 8 (Eight) Hours As Needed for Nausea or Vomiting., Disp: 30 tablet, Rfl: 1    oxybutynin XL (DITROPAN-XL) 5 MG 24 hr tablet, TAKE 1 TABLET BY MOUTH DAILY AS NEEDED FOR BLADDER SPASM, Disp: 14 tablet, Rfl: 0    pantoprazole (PROTONIX) 20 MG EC tablet, Take 1 tablet by mouth Daily., Disp: 90 tablet, Rfl: 1    phenazopyridine (Pyridium) 200 MG tablet, Take 1 tablet by mouth 3 (Three) Times a Day As Needed for Bladder Spasms., Disp: 20 tablet, Rfl: 0    Prenatal Vit-Fe Fumarate-FA (Prenatal 27-1) 27-1 MG tablet tablet, Take 1 tablet by mouth Daily., Disp: 90 each, Rfl: 2    spironolactone (ALDACTONE) 50 MG tablet, , Disp: , Rfl:     tamsulosin (FLOMAX) 0.4 MG capsule 24 hr capsule, TAKE 1 CAPSULE BY MOUTH DAILY FOR 14 DAYS, Disp: 14 capsule, Rfl: 0    vitamin D (ERGOCALCIFEROL) 1.25 MG (19248 UT) capsule capsule, TAKE ONE CAPSULE BY MOUTH ONCE WEEKLY, Disp: 12 capsule, Rfl: 1    Subjective  No Known Allergies     Past Medical History:   Diagnosis Date    Diabetes     GERD (gastroesophageal reflux disease)     History of recurrent UTIs     Seasonal allergies        Past Surgical History:   Procedure Laterality Date    KIDNEY STONE SURGERY  02/22/2024    WISDOM TOOTH EXTRACTION  2015       Family History   Problem Relation Age of Onset    Kidney failure Mother     Cancer Father         Unsure what kind    Hypertension Father     Stroke Father     Breast cancer Neg Hx     Ovarian cancer Neg Hx         Social History     Socioeconomic History    Marital status: Single   Tobacco Use    Smoking status: Every Day     Current packs/day: 0.50     Average packs/day: 0.5 packs/day for 18.2 years (9.1 ttl pk-yrs)     Types: Cigarettes     Start date: 2006     Passive exposure: Past    Smokeless tobacco: Never   Vaping Use    Vaping status: Never Used   Substance and Sexual Activity    Alcohol use: Yes     Comment: on occasion, at most 1-2 drinks a month     "Drug use: Yes     Frequency: 4.0 times per week     Types: Marijuana    Sexual activity: Yes     Partners: Male     Birth control/protection: None       Review of Systems   Constitutional:  Negative for activity change, chills, fatigue, fever and unexpected weight change.   HENT:  Negative for congestion, ear pain, postnasal drip, sinus pressure and sore throat.    Eyes:  Negative for pain, discharge and redness.   Respiratory:  Negative for cough, shortness of breath and wheezing.    Cardiovascular:  Negative for chest pain, palpitations and leg swelling.   Gastrointestinal:  Negative for diarrhea, nausea and vomiting.   Endocrine: Negative for cold intolerance and heat intolerance.   Genitourinary:  Negative for decreased urine volume and dysuria.   Musculoskeletal:  Negative for arthralgias and myalgias.   Skin:  Negative for rash and wound.   Neurological:  Negative for dizziness, light-headedness and headaches.   Hematological:  Does not bruise/bleed easily.   Psychiatric/Behavioral:  Negative for confusion, dysphoric mood and sleep disturbance. The patient is not nervous/anxious.          Objective  Vitals:    03/14/24 0952 03/14/24 1013   BP: 130/82 122/76   BP Location: Right arm    Patient Position: Sitting    Cuff Size: Large Adult    Pulse: 90    Temp: 97.1 °F (36.2 °C)    TempSrc: Temporal    SpO2: 98%    Weight: 91 kg (200 lb 9.6 oz)    Height: 160 cm (62.99\")      Body mass index is 35.54 kg/m².     Physical Exam  Physical Exam  Vitals and nursing note reviewed.   Constitutional:       General: She is not in acute distress.     Appearance: She is not ill-appearing.   HENT:      Head: Normocephalic.      Right Ear: Tympanic membrane, ear canal and external ear normal. There is no impacted cerumen.      Left Ear: Tympanic membrane, ear canal and external ear normal. There is no impacted cerumen.      Nose: No congestion or rhinorrhea.      Mouth/Throat:      Mouth: Mucous membranes are moist.      " Pharynx: Oropharynx is clear. No oropharyngeal exudate or posterior oropharyngeal erythema.   Eyes:      General:         Right eye: No discharge.         Left eye: No discharge.      Extraocular Movements: Extraocular movements intact.      Conjunctiva/sclera: Conjunctivae normal.      Pupils: Pupils are equal, round, and reactive to light.   Cardiovascular:      Rate and Rhythm: Normal rate and regular rhythm.      Heart sounds: Normal heart sounds. No murmur heard.     No friction rub. No gallop.   Pulmonary:      Effort: Pulmonary effort is normal. No respiratory distress.      Breath sounds: Normal breath sounds. No wheezing.   Abdominal:      General: Bowel sounds are normal. There is no distension.      Palpations: Abdomen is soft. There is no mass.      Tenderness: There is no abdominal tenderness.   Musculoskeletal:         General: No swelling. Normal range of motion.      Cervical back: Normal range of motion. No tenderness.      Right lower leg: No edema.      Left lower leg: No edema.   Lymphadenopathy:      Cervical: No cervical adenopathy.   Skin:     Findings: No bruising, erythema or rash.   Neurological:      Mental Status: She is oriented to person, place, and time.      Gait: Gait normal.   Psychiatric:         Mood and Affect: Mood normal.         Behavior: Behavior normal.         Thought Content: Thought content normal.         Judgment: Judgment normal.         Diagnostic Data  Procedures    Assessment  Diagnoses and all orders for this visit:    1. JACKIE (generalized anxiety disorder) (Primary)    2. Gastroesophageal reflux disease without esophagitis    3. Type 2 diabetes mellitus without complication, without long-term current use of insulin        Plan    1. JACKIE (generalized anxiety disorder) (Primary)- Well-controlled on Lexapro 5 mg daily.  Keep same.  Continue to monitor.    2. Gastroesophageal reflux disease without esophagitis- well-controlled on Protonix 20 mg daily as needed.  Keep  same.  Continue to monitor.      3. Type 2 diabetes mellitus without complication, without long-term current use of insulin- blood sugars doing better on glipizide in addition to metformin.  Keep same.  Continue to monitor.      Return in about 6 weeks (around 4/25/2024) for Recheck.    Gerson Sifuentes PA-C  03/14/2024

## 2024-03-16 ENCOUNTER — HOSPITAL ENCOUNTER (OUTPATIENT)
Dept: ULTRASOUND IMAGING | Facility: HOSPITAL | Age: 39
Discharge: HOME | End: 2024-03-16
Admitting: UROLOGY
Payer: COMMERCIAL

## 2024-03-16 DIAGNOSIS — N20.0 NEPHROLITHIASIS: ICD-10-CM

## 2024-03-16 PROCEDURE — 76775 US EXAM ABDO BACK WALL LIM: CPT

## 2024-03-27 ENCOUNTER — OFFICE VISIT (OUTPATIENT)
Dept: UROLOGY | Facility: CLINIC | Age: 39
End: 2024-03-27
Payer: COMMERCIAL

## 2024-03-27 VITALS — OXYGEN SATURATION: 98 % | HEART RATE: 93 BPM | WEIGHT: 200 LBS | HEIGHT: 63 IN | BODY MASS INDEX: 35.44 KG/M2

## 2024-03-27 DIAGNOSIS — N13.2 URETERAL STONE WITH HYDRONEPHROSIS: Primary | ICD-10-CM

## 2024-03-27 DIAGNOSIS — N32.81 OAB (OVERACTIVE BLADDER): ICD-10-CM

## 2024-03-27 NOTE — PROGRESS NOTES
Office Note Kidney Stone      Patient Name: Yasmin Singleton  : 1985   MRN: [id number]     Chief Complaint: History of Nephrolithiasis/Ureterolithiasis       History of Present Illness: Yasmin Singleton is a 38 y.o. female who presents today for 4-week follow-up after ureteroscopy and laser lithotripsy, stent removal.  She presents today with ultrasound.  She is doing well in the postoperative setting with no recurrence of flank pain.    She is reporting baseline OAB related symptoms including urinary frequency and urgency, rare urge associated incontinence.    Denies dysuria or hematuria.      Subjective      Review of System: Review of Systems   Genitourinary:  Negative for decreased urine volume, difficulty urinating, dysuria, enuresis, flank pain, frequency, hematuria and urgency.        I have reviewed the ROS documented by my clinical staff, updated as appropriate and I agree. Deon Dunbar MD    Past Medical History:   Past Medical History:   Diagnosis Date    Diabetes     GERD (gastroesophageal reflux disease)     History of recurrent UTIs     Seasonal allergies        Past Surgical History:   Past Surgical History:   Procedure Laterality Date    KIDNEY STONE SURGERY  2024    WISDOM TOOTH EXTRACTION         Family History:   Family History   Problem Relation Age of Onset    Kidney failure Mother     Cancer Father         Unsure what kind    Hypertension Father     Stroke Father     Breast cancer Neg Hx     Ovarian cancer Neg Hx        Social History:   Social History     Socioeconomic History    Marital status: Single   Tobacco Use    Smoking status: Every Day     Current packs/day: 0.50     Average packs/day: 0.5 packs/day for 18.2 years (9.1 ttl pk-yrs)     Types: Cigarettes     Start date:      Passive exposure: Past    Smokeless tobacco: Never   Vaping Use    Vaping status: Never Used   Substance and Sexual Activity    Alcohol use: Yes     Comment: on occasion, at most 1-2  drinks a month    Drug use: Yes     Frequency: 4.0 times per week     Types: Marijuana    Sexual activity: Yes     Partners: Male     Birth control/protection: None       Medications:     Current Outpatient Medications:     Boric Acid suppository Suppository, Insert 1 suppository into the vagina Every Night., Disp: 21 suppository, Rfl: 0    escitalopram (Lexapro) 5 MG tablet, Take 1 tablet by mouth Daily., Disp: 30 tablet, Rfl: 5    glipizide (Glucotrol) 5 MG tablet, Take 1 tablet by mouth 2 (Two) Times a Day Before Meals., Disp: 180 tablet, Rfl: 1    glucose blood test strip, 1 each by Other route 2 (Two) Times a Day With Meals. Use as instructed, Disp: 100 each, Rfl: 5    Lancets (freestyle) lancets, 1 each by Other route Daily. Use as instructed, Disp: 100 each, Rfl: 5    levothyroxine (SYNTHROID, LEVOTHROID) 100 MCG tablet, Take 1 tablet by mouth Daily., Disp: , Rfl:     loratadine (CLARITIN) 10 MG tablet, Take 1 tablet by mouth Daily., Disp: 30 tablet, Rfl: 5    metFORMIN ER (GLUCOPHAGE-XR) 750 MG 24 hr tablet, TAKE ONE TABLET BY MOUTH DAILY WITH BREAKFAST, Disp: 90 tablet, Rfl: 1    pantoprazole (PROTONIX) 20 MG EC tablet, Take 1 tablet by mouth Daily., Disp: 90 tablet, Rfl: 1    Prenatal Vit-Fe Fumarate-FA (Prenatal 27-1) 27-1 MG tablet tablet, Take 1 tablet by mouth Daily., Disp: 90 each, Rfl: 2    spironolactone (ALDACTONE) 50 MG tablet, , Disp: , Rfl:     vitamin D (ERGOCALCIFEROL) 1.25 MG (28076 UT) capsule capsule, TAKE ONE CAPSULE BY MOUTH ONCE WEEKLY, Disp: 12 capsule, Rfl: 1    ketorolac (TORADOL) 10 MG tablet, Take 1 tablet by mouth Every 6 (Six) Hours As Needed for Moderate Pain. (Patient not taking: Reported on 3/27/2024), Disp: 15 tablet, Rfl: 0    ondansetron ODT (ZOFRAN-ODT) 4 MG disintegrating tablet, Place 1 tablet on the tongue Every 8 (Eight) Hours As Needed for Nausea or Vomiting. (Patient not taking: Reported on 3/27/2024), Disp: 30 tablet, Rfl: 1    oxybutynin XL (DITROPAN-XL) 5 MG 24 hr  "tablet, TAKE 1 TABLET BY MOUTH DAILY AS NEEDED FOR BLADDER SPASM (Patient not taking: Reported on 3/27/2024), Disp: 14 tablet, Rfl: 0    phenazopyridine (Pyridium) 200 MG tablet, Take 1 tablet by mouth 3 (Three) Times a Day As Needed for Bladder Spasms. (Patient not taking: Reported on 3/27/2024), Disp: 20 tablet, Rfl: 0    Allergies:   No Known Allergies    Objective     Physical Exam:   Vital Signs:   Vitals:    03/27/24 1047   Pulse: 93   SpO2: 98%   Weight: 90.7 kg (200 lb)   Height: 160 cm (62.99\")   PainSc: 0-No pain     Body mass index is 35.44 kg/m².     Physical Exam  Vitals and nursing note reviewed.   Constitutional:       Appearance: Normal appearance.   HENT:      Head: Normocephalic and atraumatic.   Cardiovascular:      Comments: Well perfused  Pulmonary:      Effort: Pulmonary effort is normal.   Abdominal:      General: Abdomen is flat.      Palpations: Abdomen is soft.   Musculoskeletal:         General: Normal range of motion.   Skin:     General: Skin is warm and dry.   Neurological:      General: No focal deficit present.      Mental Status: She is alert and oriented to person, place, and time. Mental status is at baseline.   Psychiatric:         Mood and Affect: Mood normal.         Behavior: Behavior normal.         Thought Content: Thought content normal.         Judgment: Judgment normal.         Labs:   Brief Urine Lab Results  (Last result in the past 365 days)        Color   Clarity   Blood   Leuk Est   Nitrite   Protein   CREAT   Urine HCG        12/21/23 1154 Yellow   Clear   3+   Negative   Negative   Trace                   Urine Culture          12/21/2023    10:47 12/27/2023    15:03   Urine Culture   Urine Culture 50,000 CFU/mL Mixed Ava Isolated  No growth         Lab Results   Component Value Date    GLUCOSE 118 (H) 02/02/2024    CALCIUM 9.2 02/02/2024     02/02/2024    K 3.8 02/02/2024    CO2 23.0 02/02/2024     02/02/2024    BUN 6 02/02/2024    CREATININE 0.68 " 02/02/2024    EGFRIFAFRI 135 06/02/2021    EGFRIFNONA 117 06/02/2021    BCR 8.8 02/02/2024    ANIONGAP 12.0 02/02/2024       Lab Results   Component Value Date    WBC 6.39 02/02/2024    HGB 12.7 02/02/2024    HCT 39.1 02/02/2024    MCV 85.4 02/02/2024     02/02/2024         Images:   US Renal Bilateral    Result Date: 3/18/2024  Impression: Unremarkable renal ultrasound Electronically Signed: Sven Bond MD  3/18/2024 8:31 AM EDT  Workstation ID: [id number]    CT Abdomen Pelvis With & Without Contrast    Result Date: 2/12/2024  Impression: 1. Hepatic steatosis and hepatomegaly 2. No CT abnormalities in the kidneys or collecting system Electronically Signed: Sven Bond MD  2/12/2024 9:54 AM EST  Workstation ID: [id number]    XR Spine Lumbar Complete 4+VW    Result Date: 12/23/2023  Impression: 1.4 nonrib-bearing lumbar vertebra. 2.Facet arthropathy lower lumbar spine. Electronically Signed: Niraj Ash MD  12/23/2023 2:02 PM EST  Workstation ID: [id number]    US Pelvis Complete    Result Date: 12/21/2023  Impression: Unremarkable pelvic sonogram Electronically Signed: Sulaiman Dooley MD  12/21/2023 3:56 PM EST  Workstation ID: [id number]      Measures:   Tobacco:   Yasmin Singleton  reports that she has been smoking cigarettes. She started smoking about 18 years ago. She has a 9.1 pack-year smoking history. She has been exposed to tobacco smoke. She has never used smokeless tobacco. I have educated her on the risk of diseases from using tobacco products.     Assessment / Plan      Assessment/Plan:   Yasmin Singleton is a 38 y.o. female who presented today with nephrolithiasis/ureterolithiasis.  She returns today for 4-week follow-up after ureteroscopy and laser lithotripsy.  She is reporting no return of flank pain.  Ultrasound has revealed resolution of hydronephrosis, no additional stone burden.  We have discussed conservative measures to reduce stone risk including increasing fluid intake to greater than  2-2 and half liters, decreasing sodium and animal protein intake.    Additionally, patient has reported OAB related symptoms.  We have discussed conservative strategies for management.  We have also discussed potential second line options for management.  At this time she would like to avoid medication continue with conservative therapies.  She will follow-up in 6 months for symptom check.    Diagnoses and all orders for this visit:    1. Ureteral stone with hydronephrosis (Primary)    2. OAB (overactive bladder)           Follow Up:   Return in about 6 months (around 9/27/2024) for Recheck.    I spent approximately 30 minutes providing clinical care for this patient; including review of patient's chart and provider documentation, face to face time spent with patient in examination room (obtaining history, performing physical exam, discussing diagnosis and management options), placing orders, and completing patient documentation.     Deon Dunbar MD  [facility] [place]

## 2024-04-22 DIAGNOSIS — E55.9 VITAMIN D DEFICIENCY: ICD-10-CM

## 2024-04-22 RX ORDER — ERGOCALCIFEROL 1.25 MG/1
50000 CAPSULE ORAL WEEKLY
Qty: 12 CAPSULE | Refills: 1 | Status: SHIPPED | OUTPATIENT
Start: 2024-04-22

## 2024-04-25 ENCOUNTER — OFFICE VISIT (OUTPATIENT)
Dept: INTERNAL MEDICINE | Facility: CLINIC | Age: 39
End: 2024-04-25
Payer: COMMERCIAL

## 2024-04-25 VITALS
SYSTOLIC BLOOD PRESSURE: 110 MMHG | HEIGHT: 63 IN | DIASTOLIC BLOOD PRESSURE: 68 MMHG | BODY MASS INDEX: 35.12 KG/M2 | WEIGHT: 198.2 LBS | HEART RATE: 80 BPM | TEMPERATURE: 96.9 F | OXYGEN SATURATION: 97 %

## 2024-04-25 DIAGNOSIS — E11.9 TYPE 2 DIABETES MELLITUS WITHOUT COMPLICATION, WITHOUT LONG-TERM CURRENT USE OF INSULIN: ICD-10-CM

## 2024-04-25 DIAGNOSIS — E03.9 HYPOTHYROIDISM, UNSPECIFIED TYPE: ICD-10-CM

## 2024-04-25 DIAGNOSIS — F41.1 GAD (GENERALIZED ANXIETY DISORDER): Primary | ICD-10-CM

## 2024-04-25 DIAGNOSIS — Z00.00 HEALTH CARE MAINTENANCE: ICD-10-CM

## 2024-04-25 DIAGNOSIS — K21.9 GASTROESOPHAGEAL REFLUX DISEASE WITHOUT ESOPHAGITIS: ICD-10-CM

## 2024-04-25 PROCEDURE — 99214 OFFICE O/P EST MOD 30 MIN: CPT | Performed by: PHYSICIAN ASSISTANT

## 2024-04-25 RX ORDER — LEVOTHYROXINE SODIUM 0.1 MG/1
100 TABLET ORAL DAILY
Qty: 90 TABLET | Refills: 1 | Status: SHIPPED | OUTPATIENT
Start: 2024-04-25

## 2024-04-25 NOTE — PROGRESS NOTES
[facility] PRIMARY CARE  [address]  [facility] [address]  Dept: [phone]  Dept Fax: [phone]  Loc: [phone]  Loc Fax: [phone]    Yasmin Singleton  1985    Follow Up Office Visit Note    History of Present Illness:  Patient is a 38-year-old female in today to follow-up for anxiety.  On Lexapro 5 mg daily.  Taking as directed with any problems or side effects.  Reports good symptomatic control.  Recent [employer] results reviewed with patient in office today.     Sugars doing good on glipizide in addition to metformin.     Using Protonix as needed.    On Synthroid 100 mcg daily.  Needing refill.    Patient overall doing well and feeling well.             The following portions of the patient's history were reviewed and updated as appropriate: allergies, current medications, past family history, past medical history, past social history, past surgical history, and problem list.    Medications:    Current Outpatient Medications:     Boric Acid suppository Suppository, Insert 1 suppository into the vagina Every Night., Disp: 21 suppository, Rfl: 0    escitalopram (Lexapro) 5 MG tablet, Take 1 tablet by mouth Daily., Disp: 30 tablet, Rfl: 5    glipizide (Glucotrol) 5 MG tablet, Take 1 tablet by mouth 2 (Two) Times a Day Before Meals., Disp: 180 tablet, Rfl: 1    glucose blood test strip, 1 each by Other route 2 (Two) Times a Day With Meals. Use as instructed, Disp: 100 each, Rfl: 5    ketorolac (TORADOL) 10 MG tablet, Take 1 tablet by mouth Every 6 (Six) Hours As Needed for Moderate Pain., Disp: 15 tablet, Rfl: 0    Lancets (freestyle) lancets, 1 each by Other route Daily. Use as instructed, Disp: 100 each, Rfl: 5    levothyroxine (SYNTHROID, LEVOTHROID) 100 MCG tablet, Take 1 tablet by mouth Daily., Disp: 90 tablet, Rfl: 1    loratadine (CLARITIN) 10 MG tablet, Take 1 tablet by mouth Daily., Disp: 30 tablet, Rfl: 5    metFORMIN ER (GLUCOPHAGE-XR)  750 MG 24 hr tablet, TAKE ONE TABLET BY MOUTH DAILY WITH BREAKFAST, Disp: 90 tablet, Rfl: 1    ondansetron ODT (ZOFRAN-ODT) 4 MG disintegrating tablet, Place 1 tablet on the tongue Every 8 (Eight) Hours As Needed for Nausea or Vomiting., Disp: 30 tablet, Rfl: 1    oxybutynin XL (DITROPAN-XL) 5 MG 24 hr tablet, TAKE 1 TABLET BY MOUTH DAILY AS NEEDED FOR BLADDER SPASM, Disp: 14 tablet, Rfl: 0    pantoprazole (PROTONIX) 20 MG EC tablet, Take 1 tablet by mouth Daily., Disp: 90 tablet, Rfl: 1    phenazopyridine (Pyridium) 200 MG tablet, Take 1 tablet by mouth 3 (Three) Times a Day As Needed for Bladder Spasms., Disp: 20 tablet, Rfl: 0    Prenatal Vit-Fe Fumarate-FA (Prenatal 27-1) 27-1 MG tablet tablet, Take 1 tablet by mouth Daily., Disp: 90 each, Rfl: 2    spironolactone (ALDACTONE) 50 MG tablet, , Disp: , Rfl:     vitamin D (ERGOCALCIFEROL) 1.25 MG (47798 UT) capsule capsule, TAKE 1 CAPSULE BY MOUTH ONCE WEEKLY, Disp: 12 capsule, Rfl: 1    Subjective  No Known Allergies     Past Medical History:   Diagnosis Date    Diabetes     GERD (gastroesophageal reflux disease)     History of recurrent UTIs     Seasonal allergies        Past Surgical History:   Procedure Laterality Date    KIDNEY STONE SURGERY  02/22/2024    WISDOM TOOTH EXTRACTION  2015       Family History   Problem Relation Age of Onset    Kidney failure Mother     Cancer Father         Unsure what kind    Hypertension Father     Stroke Father     Breast cancer Neg Hx     Ovarian cancer Neg Hx         Social History     Socioeconomic History    Marital status: Single   Tobacco Use    Smoking status: Every Day     Current packs/day: 0.50     Average packs/day: 0.5 packs/day for 18.3 years (9.2 ttl pk-yrs)     Types: Cigarettes     Start date: 2006     Passive exposure: Past    Smokeless tobacco: Never   Vaping Use    Vaping status: Never Used   Substance and Sexual Activity    Alcohol use: Yes     Comment: on occasion, at most 1-2 drinks a month    Drug use:  "Yes     Frequency: 4.0 times per week     Types: Marijuana    Sexual activity: Yes     Partners: Male     Birth control/protection: None       Review of Systems   Constitutional:  Negative for activity change, chills, fatigue, fever and unexpected weight change.   HENT:  Negative for congestion, ear pain, postnasal drip, sinus pressure and sore throat.    Eyes:  Negative for pain, discharge and redness.   Respiratory:  Negative for cough, shortness of breath and wheezing.    Cardiovascular:  Negative for chest pain, palpitations and leg swelling.   Gastrointestinal:  Negative for diarrhea, nausea and vomiting.   Endocrine: Negative for cold intolerance and heat intolerance.   Genitourinary:  Negative for decreased urine volume and dysuria.   Musculoskeletal:  Negative for arthralgias and myalgias.   Skin:  Negative for rash and wound.   Neurological:  Negative for dizziness, light-headedness and headaches.   Hematological:  Does not bruise/bleed easily.   Psychiatric/Behavioral:  Negative for confusion, dysphoric mood and sleep disturbance. The patient is not nervous/anxious.          Objective  Vitals:    04/25/24 1057   BP: 110/68   BP Location: Left arm   Patient Position: Sitting   Cuff Size: Large Adult   Pulse: 80   Temp: 96.9 °F (36.1 °C)   TempSrc: Temporal   SpO2: 97%   Weight: 89.9 kg (198 lb 3.2 oz)   Height: 160 cm (62.99\")     Body mass index is 35.12 kg/m².     Physical Exam  Physical Exam  Vitals and nursing note reviewed.   Constitutional:       General: She is not in acute distress.     Appearance: She is not ill-appearing.   HENT:      Head: Normocephalic.      Right Ear: Tympanic membrane, ear canal and external ear normal. There is no impacted cerumen.      Left Ear: Tympanic membrane, ear canal and external ear normal. There is no impacted cerumen.      Nose: No congestion or rhinorrhea.      Mouth/Throat:      Mouth: Mucous membranes are moist.      Pharynx: Oropharynx is clear. No " oropharyngeal exudate or posterior oropharyngeal erythema.   Eyes:      General:         Right eye: No discharge.         Left eye: No discharge.      Extraocular Movements: Extraocular movements intact.      Conjunctiva/sclera: Conjunctivae normal.      Pupils: Pupils are equal, round, and reactive to light.   Cardiovascular:      Rate and Rhythm: Normal rate and regular rhythm.      Heart sounds: Normal heart sounds. No murmur heard.     No friction rub. No gallop.   Pulmonary:      Effort: Pulmonary effort is normal. No respiratory distress.      Breath sounds: Normal breath sounds. No wheezing.   Abdominal:      General: Bowel sounds are normal. There is no distension.      Palpations: Abdomen is soft. There is no mass.      Tenderness: There is no abdominal tenderness.   Musculoskeletal:         General: No swelling. Normal range of motion.      Cervical back: Normal range of motion. No tenderness.      Right lower leg: No edema.      Left lower leg: No edema.   Lymphadenopathy:      Cervical: No cervical adenopathy.   Skin:     Findings: No bruising, erythema or rash.   Neurological:      Mental Status: She is oriented to person, place, and time.      Gait: Gait normal.   Psychiatric:         Mood and Affect: Mood normal.         Behavior: Behavior normal.         Thought Content: Thought content normal.         Judgment: Judgment normal.         Diagnostic Data  Procedures    Assessment  Diagnoses and all orders for this visit:    1. JACKIE (generalized anxiety disorder) (Primary)    2. Gastroesophageal reflux disease without esophagitis    3. Type 2 diabetes mellitus without complication, without long-term current use of insulin  -     Microalbumin / Creatinine Urine Ratio - Urine, Clean Catch; Future    4. Health care maintenance  -     CBC (No Diff)  -     Comprehensive Metabolic Panel  -     TSH Rfx On Abnormal To Free T4  -     Hemoglobin A1c; Future  -     Lipid Panel    5. Hypothyroidism, unspecified  type    Other orders  -     levothyroxine (SYNTHROID, LEVOTHROID) 100 MCG tablet; Take 1 tablet by mouth Daily.  Dispense: 90 tablet; Refill: 1        Plan    1. JACKIE (generalized anxiety disorder) (Primary)- well-controlled on Lexapro 5 mg daily.  Keep same.  Continue to monitor.    2. Gastroesophageal reflux disease without esophagitis- well-controlled on Protonix 20 mg daily.  Keep same.  Continue to monitor.    3. Type 2 diabetes mellitus without complication, without long-term current use of insulin- seems to be doing good on metformin and glipizide.  Ordered microalbumin.    4. Health care maintenance- ordered fasting labs.    5. Hypothyroidism, unspecified type- on Synthroid, refilled med, repeat TSH.      Return in about 3 months (around 7/25/2024) for Recheck.    Gerson Sifuentes PA-C  04/25/2024

## 2024-05-13 ENCOUNTER — CLINICAL SUPPORT (OUTPATIENT)
Dept: INTERNAL MEDICINE | Facility: CLINIC | Age: 39
End: 2024-05-13
Payer: COMMERCIAL

## 2024-05-13 DIAGNOSIS — E11.9 TYPE 2 DIABETES MELLITUS WITHOUT COMPLICATION, WITHOUT LONG-TERM CURRENT USE OF INSULIN: ICD-10-CM

## 2024-05-13 PROCEDURE — 82570 ASSAY OF URINE CREATININE: CPT | Performed by: PHYSICIAN ASSISTANT

## 2024-05-13 PROCEDURE — 82043 UR ALBUMIN QUANTITATIVE: CPT | Performed by: PHYSICIAN ASSISTANT

## 2024-05-14 LAB
ALBUMIN UR-MCNC: 3 MG/DL
CREAT UR-MCNC: 284 MG/DL
MICROALBUMIN/CREAT UR: 10.6 MG/G (ref 0–29)

## 2024-05-18 DIAGNOSIS — E11.9 TYPE 2 DIABETES MELLITUS WITHOUT COMPLICATION, WITHOUT LONG-TERM CURRENT USE OF INSULIN: ICD-10-CM

## 2024-05-20 RX ORDER — METFORMIN HYDROCHLORIDE 750 MG/1
750 TABLET, EXTENDED RELEASE ORAL
Qty: 90 TABLET | Refills: 1 | Status: SHIPPED | OUTPATIENT
Start: 2024-05-20

## 2024-05-21 ENCOUNTER — OFFICE VISIT (OUTPATIENT)
Dept: OBSTETRICS AND GYNECOLOGY | Facility: CLINIC | Age: 39
End: 2024-05-21
Payer: COMMERCIAL

## 2024-05-21 VITALS
BODY MASS INDEX: 35.08 KG/M2 | HEIGHT: 63 IN | DIASTOLIC BLOOD PRESSURE: 80 MMHG | SYSTOLIC BLOOD PRESSURE: 116 MMHG | WEIGHT: 198 LBS

## 2024-05-21 DIAGNOSIS — N89.8 FOUL SMELLING VAGINAL DISCHARGE: ICD-10-CM

## 2024-05-21 DIAGNOSIS — Z01.411 ENCOUNTER FOR GYNECOLOGICAL EXAMINATION WITH ABNORMAL FINDING: Primary | ICD-10-CM

## 2024-05-21 PROCEDURE — 99213 OFFICE O/P EST LOW 20 MIN: CPT | Performed by: NURSE PRACTITIONER

## 2024-05-21 PROCEDURE — 99459 PELVIC EXAMINATION: CPT | Performed by: NURSE PRACTITIONER

## 2024-05-21 PROCEDURE — 99395 PREV VISIT EST AGE 18-39: CPT | Performed by: NURSE PRACTITIONER

## 2024-05-21 NOTE — PROGRESS NOTES
Chief Complaint  Yasmin Singleton is a 38 y.o.  female presenting for Annual Exam and Vaginitis (Patient states that she has a problem with recurrent BV.  Malodor.)    History of Present Illness  Yasmin is a very pleasant 39yo, nulligravid woman, here for annual gyn exam.  She has no past history of any gynecologic surgeries.  She has a distant past history of PCOS with somewhat irregular menstrual periods.  But states menstrual periods in the past year have been regular every month.  She is not currently sexually active.  Declines any STD screening.  She is not using any method of contraception.  She complains of a malodorous vaginal discharge, and feels that she might have recurrent bacterial vaginosis.    She has diabetes with a recent A1c of 7.5.  She recently started glipizide in addition to her metformin.  No UTIs in the past year.   She is still smoking approximately 0.5 ppd.        The following portions of the patient's history were reviewed and updated as appropriate: allergies, current medications, past family history, past medical history, past social history, past surgical history, and problem list.    No Known Allergies      Current Outpatient Medications:     Boric Acid suppository Suppository, Insert 1 suppository into the vagina Every Night., Disp: 21 suppository, Rfl: 0    escitalopram (Lexapro) 5 MG tablet, Take 1 tablet by mouth Daily., Disp: 30 tablet, Rfl: 5    glipizide (Glucotrol) 5 MG tablet, Take 1 tablet by mouth 2 (Two) Times a Day Before Meals., Disp: 180 tablet, Rfl: 1    glucose blood test strip, 1 each by Other route 2 (Two) Times a Day With Meals. Use as instructed, Disp: 100 each, Rfl: 5    ketorolac (TORADOL) 10 MG tablet, Take 1 tablet by mouth Every 6 (Six) Hours As Needed for Moderate Pain., Disp: 15 tablet, Rfl: 0    Lancets (freestyle) lancets, 1 each by Other route Daily. Use as instructed, Disp: 100 each, Rfl: 5    levothyroxine (SYNTHROID, LEVOTHROID) 100 MCG tablet,  "Take 1 tablet by mouth Daily., Disp: 90 tablet, Rfl: 1    loratadine (CLARITIN) 10 MG tablet, Take 1 tablet by mouth Daily., Disp: 30 tablet, Rfl: 5    metFORMIN ER (GLUCOPHAGE-XR) 750 MG 24 hr tablet, TAKE 1 TABLET BY MOUTH DAILY WITH BREAKFAST, Disp: 90 tablet, Rfl: 1    ondansetron ODT (ZOFRAN-ODT) 4 MG disintegrating tablet, Place 1 tablet on the tongue Every 8 (Eight) Hours As Needed for Nausea or Vomiting., Disp: 30 tablet, Rfl: 1    oxybutynin XL (DITROPAN-XL) 5 MG 24 hr tablet, TAKE 1 TABLET BY MOUTH DAILY AS NEEDED FOR BLADDER SPASM, Disp: 14 tablet, Rfl: 0    pantoprazole (PROTONIX) 20 MG EC tablet, Take 1 tablet by mouth Daily., Disp: 90 tablet, Rfl: 1    phenazopyridine (Pyridium) 200 MG tablet, Take 1 tablet by mouth 3 (Three) Times a Day As Needed for Bladder Spasms., Disp: 20 tablet, Rfl: 0    Prenatal Vit-Fe Fumarate-FA (Prenatal 27-1) 27-1 MG tablet tablet, Take 1 tablet by mouth Daily., Disp: 90 each, Rfl: 2    spironolactone (ALDACTONE) 50 MG tablet, , Disp: , Rfl:     vitamin D (ERGOCALCIFEROL) 1.25 MG (16114 UT) capsule capsule, TAKE 1 CAPSULE BY MOUTH ONCE WEEKLY, Disp: 12 capsule, Rfl: 1    Past Medical History:   Diagnosis Date    Diabetes     GERD (gastroesophageal reflux disease)     History of recurrent UTIs     Kidney stone     Seasonal allergies         Past Surgical History:   Procedure Laterality Date    KIDNEY STONE SURGERY  02/22/2024    WISDOM TOOTH EXTRACTION  2015       Objective  /80   Ht 160 cm (63\")   Wt 89.8 kg (198 lb)   LMP 04/28/2024 (Exact Date)   Breastfeeding No   BMI 35.07 kg/m²     Physical Exam  Vitals and nursing note reviewed. Exam conducted with a chaperone present.   Constitutional:       General: She is not in acute distress.     Appearance: Normal appearance. She is not ill-appearing.   HENT:      Head: Normocephalic.   Neck:      Thyroid: No thyroid mass or thyromegaly.   Cardiovascular:      Rate and Rhythm: Normal rate and regular rhythm.      " Heart sounds: Normal heart sounds. No murmur heard.  Pulmonary:      Effort: Pulmonary effort is normal. No respiratory distress.      Breath sounds: Normal breath sounds.   Chest:   Breasts:     Right: No inverted nipple, mass or nipple discharge.      Left: No inverted nipple, mass or nipple discharge.   Abdominal:      Palpations: Abdomen is soft. There is no mass.      Tenderness: There is no abdominal tenderness.   Genitourinary:     General: Normal vulva.      Labia:         Right: No rash, tenderness or lesion.         Left: No rash, tenderness or lesion.       Vagina: Normal. Vaginal discharge present. No erythema.      Cervix: No discharge, lesion or erythema.      Uterus: Not enlarged and not tender.       Adnexa:         Right: No mass or tenderness.          Left: No mass or tenderness.        Comments: Small amt of white/ slightly thin discharge.  OneSwab taken.  Pap smear taken.  No CMT.  Normal bimanual exam.  Anus appears wnl.  No rectal exam performed.  Lymphadenopathy:      Upper Body:      Right upper body: No supraclavicular or axillary adenopathy.      Left upper body: No supraclavicular or axillary adenopathy.   Skin:     General: Skin is warm and dry.   Neurological:      Mental Status: She is alert and oriented to person, place, and time.   Psychiatric:         Mood and Affect: Mood normal.         Behavior: Behavior normal.         Assessment/Plan   Diagnoses and all orders for this visit:    1. Encounter for gynecological examination with abnormal finding (Primary)  -     LIQUID-BASED PAP SMEAR WITH HPV GENOTYPING REGARDLESS OF INTERPRETATION (UCHE,[facility],MAD)    2. Foul smelling vaginal discharge  -     OneSwab - Swab, Vagina; Future        Procedures    19 to 39: Counseling/Anticipatory Guidance Discussed: family planning/contraception, sexual behavior and STDs, and breast cancer and self breast exams    Return in about 1 year (around 5/21/2025) for Annual physical.    Daxa Murphy,  APRN  05/21/2024

## 2024-05-24 LAB — REF LAB TEST METHOD: NORMAL

## 2024-06-03 ENCOUNTER — OFFICE VISIT (OUTPATIENT)
Dept: INTERNAL MEDICINE | Facility: CLINIC | Age: 39
End: 2024-06-03
Payer: COMMERCIAL

## 2024-06-03 ENCOUNTER — LAB (OUTPATIENT)
Dept: INTERNAL MEDICINE | Facility: CLINIC | Age: 39
End: 2024-06-03
Payer: COMMERCIAL

## 2024-06-03 VITALS
WEIGHT: 195.8 LBS | OXYGEN SATURATION: 98 % | HEIGHT: 63 IN | DIASTOLIC BLOOD PRESSURE: 84 MMHG | SYSTOLIC BLOOD PRESSURE: 126 MMHG | BODY MASS INDEX: 34.69 KG/M2 | TEMPERATURE: 96.9 F | HEART RATE: 82 BPM

## 2024-06-03 DIAGNOSIS — E11.9 TYPE 2 DIABETES MELLITUS WITHOUT COMPLICATION, WITHOUT LONG-TERM CURRENT USE OF INSULIN: Primary | ICD-10-CM

## 2024-06-03 DIAGNOSIS — Z00.00 HEALTH CARE MAINTENANCE: ICD-10-CM

## 2024-06-03 DIAGNOSIS — F41.1 GAD (GENERALIZED ANXIETY DISORDER): ICD-10-CM

## 2024-06-03 DIAGNOSIS — E03.9 HYPOTHYROIDISM, UNSPECIFIED TYPE: ICD-10-CM

## 2024-06-03 LAB — HBA1C MFR BLD: 6.9 % (ref 4.8–5.6)

## 2024-06-03 PROCEDURE — 80050 GENERAL HEALTH PANEL: CPT | Performed by: PHYSICIAN ASSISTANT

## 2024-06-03 PROCEDURE — 36415 COLL VENOUS BLD VENIPUNCTURE: CPT | Performed by: PHYSICIAN ASSISTANT

## 2024-06-03 PROCEDURE — 80061 LIPID PANEL: CPT | Performed by: PHYSICIAN ASSISTANT

## 2024-06-03 PROCEDURE — 83036 HEMOGLOBIN GLYCOSYLATED A1C: CPT | Performed by: PHYSICIAN ASSISTANT

## 2024-06-03 PROCEDURE — 99214 OFFICE O/P EST MOD 30 MIN: CPT | Performed by: PHYSICIAN ASSISTANT

## 2024-06-03 NOTE — PROGRESS NOTES
[facility] PRIMARY CARE  [address]  [facility] [address]  Dept: [phone]  Dept Fax: [phone]  Loc: [phone]  Loc Fax: [phone]    Yasmin Singleton  1985    Follow Up Office Visit Note    History of Present Illness:  Patient is a 38-year-old female in today to follow-up for anxiety.  On Lexapro 5 mg daily.  Taking as directed with any problems or side effects.  Reports good symptomatic control.  Recent [employer] results reviewed with patient in office today.     Sugars doing good on glipizide in addition to metformin.     Using Protonix as needed.     On Synthroid 100 mcg daily.  Needing refill.     Patient overall doing well and feeling well.             The following portions of the patient's history were reviewed and updated as appropriate: allergies, current medications, past family history, past medical history, past social history, past surgical history, and problem list.    Medications:    Current Outpatient Medications:     escitalopram (Lexapro) 5 MG tablet, Take 1 tablet by mouth Daily., Disp: 30 tablet, Rfl: 5    glipizide (Glucotrol) 5 MG tablet, Take 1 tablet by mouth 2 (Two) Times a Day Before Meals., Disp: 180 tablet, Rfl: 1    glucose blood test strip, 1 each by Other route 2 (Two) Times a Day With Meals. Use as instructed, Disp: 100 each, Rfl: 5    ketorolac (TORADOL) 10 MG tablet, Take 1 tablet by mouth Every 6 (Six) Hours As Needed for Moderate Pain., Disp: 15 tablet, Rfl: 0    Lancets (freestyle) lancets, 1 each by Other route Daily. Use as instructed, Disp: 100 each, Rfl: 5    levothyroxine (SYNTHROID, LEVOTHROID) 100 MCG tablet, Take 1 tablet by mouth Daily., Disp: 90 tablet, Rfl: 1    loratadine (CLARITIN) 10 MG tablet, Take 1 tablet by mouth Daily., Disp: 30 tablet, Rfl: 5    metFORMIN ER (GLUCOPHAGE-XR) 750 MG 24 hr tablet, TAKE 1 TABLET BY MOUTH DAILY WITH BREAKFAST, Disp: 90 tablet, Rfl: 1    oxybutynin XL  (DITROPAN-XL) 5 MG 24 hr tablet, TAKE 1 TABLET BY MOUTH DAILY AS NEEDED FOR BLADDER SPASM, Disp: 14 tablet, Rfl: 0    pantoprazole (PROTONIX) 20 MG EC tablet, Take 1 tablet by mouth Daily., Disp: 90 tablet, Rfl: 1    phenazopyridine (Pyridium) 200 MG tablet, Take 1 tablet by mouth 3 (Three) Times a Day As Needed for Bladder Spasms., Disp: 20 tablet, Rfl: 0    Prenatal Vit-Fe Fumarate-FA (Prenatal 27-1) 27-1 MG tablet tablet, Take 1 tablet by mouth Daily., Disp: 90 each, Rfl: 2    spironolactone (ALDACTONE) 50 MG tablet, , Disp: , Rfl:     vitamin D (ERGOCALCIFEROL) 1.25 MG (42646 UT) capsule capsule, TAKE 1 CAPSULE BY MOUTH ONCE WEEKLY, Disp: 12 capsule, Rfl: 1    Subjective  No Known Allergies     Past Medical History:   Diagnosis Date    Diabetes     GERD (gastroesophageal reflux disease)     History of recurrent UTIs     Kidney stone     Seasonal allergies        Past Surgical History:   Procedure Laterality Date    KIDNEY STONE SURGERY  02/22/2024    WISDOM TOOTH EXTRACTION  2015       Family History   Problem Relation Age of Onset    Kidney failure Mother     Cancer Father         Unsure what kind    Hypertension Father     Stroke Father     Breast cancer Neg Hx     Ovarian cancer Neg Hx         Social History     Socioeconomic History    Marital status: Single   Tobacco Use    Smoking status: Every Day     Current packs/day: 0.50     Average packs/day: 0.5 packs/day for 18.4 years (9.2 ttl pk-yrs)     Types: Cigarettes     Start date: 2006     Passive exposure: Past    Smokeless tobacco: Never   Vaping Use    Vaping status: Never Used   Substance and Sexual Activity    Alcohol use: Yes     Comment: on occasion, at most 1-2 drinks a month    Drug use: Yes     Frequency: 4.0 times per week     Types: Marijuana    Sexual activity: Not Currently     Partners: Male     Birth control/protection: None       Review of Systems   Constitutional:  Negative for activity change, chills, fatigue, fever and unexpected weight  "change.   HENT:  Negative for congestion, ear pain, postnasal drip, sinus pressure and sore throat.    Eyes:  Negative for pain, discharge and redness.   Respiratory:  Negative for cough, shortness of breath and wheezing.    Cardiovascular:  Negative for chest pain, palpitations and leg swelling.   Gastrointestinal:  Negative for diarrhea, nausea and vomiting.   Endocrine: Negative for cold intolerance and heat intolerance.   Genitourinary:  Negative for decreased urine volume and dysuria.   Musculoskeletal:  Negative for arthralgias and myalgias.   Skin:  Negative for rash and wound.   Neurological:  Negative for dizziness, light-headedness and headaches.   Hematological:  Does not bruise/bleed easily.   Psychiatric/Behavioral:  Negative for confusion, dysphoric mood and sleep disturbance. The patient is not nervous/anxious.          Objective  Vitals:    06/03/24 1059   BP: 126/84   BP Location: Left arm   Patient Position: Sitting   Cuff Size: Large Adult   Pulse: 82   Temp: 96.9 °F (36.1 °C)   TempSrc: Temporal   SpO2: 98%   Weight: 88.8 kg (195 lb 12.8 oz)   Height: 160 cm (62.99\")     Body mass index is 34.69 kg/m².     Physical Exam  Physical Exam  Vitals and nursing note reviewed.   Constitutional:       General: She is not in acute distress.     Appearance: She is not ill-appearing.   HENT:      Head: Normocephalic.      Right Ear: Tympanic membrane, ear canal and external ear normal. There is no impacted cerumen.      Left Ear: Tympanic membrane, ear canal and external ear normal. There is no impacted cerumen.      Nose: No congestion or rhinorrhea.      Mouth/Throat:      Mouth: Mucous membranes are moist.      Pharynx: Oropharynx is clear. No oropharyngeal exudate or posterior oropharyngeal erythema.   Eyes:      General:         Right eye: No discharge.         Left eye: No discharge.      Extraocular Movements: Extraocular movements intact.      Conjunctiva/sclera: Conjunctivae normal.      Pupils: " Pupils are equal, round, and reactive to light.   Cardiovascular:      Rate and Rhythm: Normal rate and regular rhythm.      Heart sounds: Normal heart sounds. No murmur heard.     No friction rub. No gallop.   Pulmonary:      Effort: Pulmonary effort is normal. No respiratory distress.      Breath sounds: Normal breath sounds. No wheezing.   Abdominal:      General: Bowel sounds are normal. There is no distension.      Palpations: Abdomen is soft. There is no mass.      Tenderness: There is no abdominal tenderness.   Musculoskeletal:         General: No swelling. Normal range of motion.      Cervical back: Normal range of motion. No tenderness.      Right lower leg: No edema.      Left lower leg: No edema.   Lymphadenopathy:      Cervical: No cervical adenopathy.   Skin:     Findings: No bruising, erythema or rash.   Neurological:      Mental Status: She is oriented to person, place, and time.      Gait: Gait normal.   Psychiatric:         Mood and Affect: Mood normal.         Behavior: Behavior normal.         Thought Content: Thought content normal.         Judgment: Judgment normal.         Diagnostic Data  Procedures    Assessment  Diagnoses and all orders for this visit:    1. Type 2 diabetes mellitus without complication, without long-term current use of insulin (Primary)    2. JACKIE (generalized anxiety disorder)    3. Hypothyroidism, unspecified type    4. Health care maintenance  -     Cancel: CBC (No Diff)  -     Cancel: Comprehensive Metabolic Panel  -     Cancel: TSH Rfx On Abnormal To Free T4  -     Hemoglobin A1c; Future  -     Cancel: Lipid Panel        Plan    1. Type 2 diabetes mellitus without complication, without long-term current use of insulin (Primary)- On metformin 750 mg extended release daily.  Repeat A1c.    2. JACKIE (generalized anxiety disorder)- well-controlled Lexapro 5 mg daily.  Keep same.  Continue to monitor.    3. Hypothyroidism, unspecified type- on levothyroxine.  Repeat TSH.    4.  Health care maintenance- ordered fasting labs.      Return in about 3 months (around 9/3/2024) for Recheck.    Gerson Sifuentes PA-C  06/03/2024

## 2024-06-04 ENCOUNTER — TELEPHONE (OUTPATIENT)
Dept: INTERNAL MEDICINE | Facility: CLINIC | Age: 39
End: 2024-06-04
Payer: COMMERCIAL

## 2024-06-04 LAB
ALBUMIN SERPL-MCNC: 4.3 G/DL (ref 3.5–5.2)
ALBUMIN/GLOB SERPL: 1.5 G/DL
ALP SERPL-CCNC: 66 U/L (ref 39–117)
ALT SERPL W P-5'-P-CCNC: 24 U/L (ref 1–33)
ANION GAP SERPL CALCULATED.3IONS-SCNC: 10.4 MMOL/L (ref 5–15)
AST SERPL-CCNC: 19 U/L (ref 1–32)
BILIRUB SERPL-MCNC: <0.2 MG/DL (ref 0–1.2)
BUN SERPL-MCNC: 7 MG/DL (ref 6–20)
BUN/CREAT SERPL: 11.7 (ref 7–25)
CALCIUM SPEC-SCNC: 9 MG/DL (ref 8.6–10.5)
CHLORIDE SERPL-SCNC: 106 MMOL/L (ref 98–107)
CHOLEST SERPL-MCNC: 165 MG/DL (ref 0–200)
CO2 SERPL-SCNC: 22.6 MMOL/L (ref 22–29)
CREAT SERPL-MCNC: 0.6 MG/DL (ref 0.57–1)
DEPRECATED RDW RBC AUTO: 43.4 FL (ref 37–54)
EGFRCR SERPLBLD CKD-EPI 2021: 118.7 ML/MIN/1.73
ERYTHROCYTE [DISTWIDTH] IN BLOOD BY AUTOMATED COUNT: 13.8 % (ref 12.3–15.4)
GLOBULIN UR ELPH-MCNC: 2.8 GM/DL
GLUCOSE SERPL-MCNC: 154 MG/DL (ref 65–99)
HCT VFR BLD AUTO: 40.4 % (ref 34–46.6)
HDLC SERPL-MCNC: 47 MG/DL (ref 40–60)
HGB BLD-MCNC: 13.5 G/DL (ref 12–15.9)
LDLC SERPL CALC-MCNC: 107 MG/DL (ref 0–100)
LDLC/HDLC SERPL: 2.27 {RATIO}
MCH RBC QN AUTO: 29.2 PG (ref 26.6–33)
MCHC RBC AUTO-ENTMCNC: 33.4 G/DL (ref 31.5–35.7)
MCV RBC AUTO: 87.3 FL (ref 79–97)
PLATELET # BLD AUTO: 262 10*3/MM3 (ref 140–450)
PMV BLD AUTO: 9.6 FL (ref 6–12)
POTASSIUM SERPL-SCNC: 3.9 MMOL/L (ref 3.5–5.2)
PROT SERPL-MCNC: 7.1 G/DL (ref 6–8.5)
RBC # BLD AUTO: 4.63 10*6/MM3 (ref 3.77–5.28)
SODIUM SERPL-SCNC: 139 MMOL/L (ref 136–145)
TRIGL SERPL-MCNC: 56 MG/DL (ref 0–150)
TSH SERPL DL<=0.05 MIU/L-ACNC: 1.99 UIU/ML (ref 0.27–4.2)
VLDLC SERPL-MCNC: 11 MG/DL (ref 5–40)
WBC NRBC COR # BLD AUTO: 6.54 10*3/MM3 (ref 3.4–10.8)

## 2024-06-04 NOTE — TELEPHONE ENCOUNTER
Hub to relay      ----- Message from Gerson Sifuentes sent at 6/4/2024 10:00 AM EDT -----  A1c better, bad cholesterol slightly elevated, please advise on cutting back high fat foods, red meats, butter, eggs, etc.  Thank you.

## 2024-06-07 ENCOUNTER — OFFICE VISIT (OUTPATIENT)
Dept: UROLOGY | Facility: CLINIC | Age: 39
End: 2024-06-07
Payer: COMMERCIAL

## 2024-06-07 DIAGNOSIS — R10.9 FLANK PAIN: Primary | ICD-10-CM

## 2024-06-07 NOTE — PROGRESS NOTES
Office Note Kidney Stone      Patient Name: Yasmin Singleton  : 1985   MRN: [id number]     Chief Complaint: History of Nephrolithiasis/Ureterolithiasis       History of Present Illness: Yasmin Singleton is a 38 y.o. female who presents today for evaluation due to new to reported flank pain.  She has a history of nephrolithiasis/ureterolithiasis quiring previous surgical intervention.  She denies bothersome lower urinary tract symptoms she does report intermittent nature of flank pain.  Reports predominant right versus the left.        Subjective      Review of System: Review of Systems   Genitourinary:  Negative for decreased urine volume, difficulty urinating, dysuria, enuresis, flank pain, frequency, hematuria and urgency.        I have reviewed the ROS documented by my clinical staff, updated as appropriate and I agree. Deon Dunbar MD    Past Medical History:   Past Medical History:   Diagnosis Date    Diabetes     GERD (gastroesophageal reflux disease)     History of recurrent UTIs     Kidney stone     Seasonal allergies        Past Surgical History:   Past Surgical History:   Procedure Laterality Date    KIDNEY STONE SURGERY  2024    WISDOM TOOTH EXTRACTION         Family History:   Family History   Problem Relation Age of Onset    Kidney failure Mother     Cancer Father         Unsure what kind    Hypertension Father     Stroke Father     Breast cancer Neg Hx     Ovarian cancer Neg Hx        Social History:   Social History     Socioeconomic History    Marital status: Single   Tobacco Use    Smoking status: Every Day     Current packs/day: 0.50     Average packs/day: 0.5 packs/day for 18.4 years (9.2 ttl pk-yrs)     Types: Cigarettes     Start date:      Passive exposure: Past    Smokeless tobacco: Never   Vaping Use    Vaping status: Never Used   Substance and Sexual Activity    Alcohol use: Yes     Comment: on occasion, at most 1-2 drinks a month    Drug use: Yes      Frequency: 4.0 times per week     Types: Marijuana    Sexual activity: Not Currently     Partners: Male     Birth control/protection: None       Medications:     Current Outpatient Medications:     escitalopram (Lexapro) 5 MG tablet, Take 1 tablet by mouth Daily., Disp: 30 tablet, Rfl: 5    glipizide (Glucotrol) 5 MG tablet, Take 1 tablet by mouth 2 (Two) Times a Day Before Meals., Disp: 180 tablet, Rfl: 1    glucose blood test strip, 1 each by Other route 2 (Two) Times a Day With Meals. Use as instructed, Disp: 100 each, Rfl: 5    Lancets (freestyle) lancets, 1 each by Other route Daily. Use as instructed, Disp: 100 each, Rfl: 5    levothyroxine (SYNTHROID, LEVOTHROID) 100 MCG tablet, Take 1 tablet by mouth Daily., Disp: 90 tablet, Rfl: 1    loratadine (CLARITIN) 10 MG tablet, Take 1 tablet by mouth Daily., Disp: 30 tablet, Rfl: 5    metFORMIN ER (GLUCOPHAGE-XR) 750 MG 24 hr tablet, TAKE 1 TABLET BY MOUTH DAILY WITH BREAKFAST, Disp: 90 tablet, Rfl: 1    oxybutynin XL (DITROPAN-XL) 5 MG 24 hr tablet, TAKE 1 TABLET BY MOUTH DAILY AS NEEDED FOR BLADDER SPASM, Disp: 14 tablet, Rfl: 0    pantoprazole (PROTONIX) 20 MG EC tablet, Take 1 tablet by mouth Daily., Disp: 90 tablet, Rfl: 1    Prenatal Vit-Fe Fumarate-FA (Prenatal 27-1) 27-1 MG tablet tablet, Take 1 tablet by mouth Daily., Disp: 90 each, Rfl: 2    spironolactone (ALDACTONE) 50 MG tablet, , Disp: , Rfl:     vitamin D (ERGOCALCIFEROL) 1.25 MG (15507 UT) capsule capsule, TAKE 1 CAPSULE BY MOUTH ONCE WEEKLY, Disp: 12 capsule, Rfl: 1    ketorolac (TORADOL) 10 MG tablet, Take 1 tablet by mouth Every 6 (Six) Hours As Needed for Moderate Pain. (Patient not taking: Reported on 6/7/2024), Disp: 15 tablet, Rfl: 0    phenazopyridine (Pyridium) 200 MG tablet, Take 1 tablet by mouth 3 (Three) Times a Day As Needed for Bladder Spasms. (Patient not taking: Reported on 6/7/2024), Disp: 20 tablet, Rfl: 0    Allergies:   No Known Allergies    Objective     Physical Exam:   Vital  Signs: There were no vitals filed for this visit.  There is no height or weight on file to calculate BMI.     Physical Exam  Vitals and nursing note reviewed.   Constitutional:       Appearance: Normal appearance.   HENT:      Head: Normocephalic and atraumatic.   Cardiovascular:      Comments: Well perfused  Pulmonary:      Effort: Pulmonary effort is normal.   Abdominal:      General: Abdomen is flat.      Palpations: Abdomen is soft.   Musculoskeletal:         General: Normal range of motion.   Skin:     General: Skin is warm and dry.   Neurological:      General: No focal deficit present.      Mental Status: She is alert and oriented to person, place, and time. Mental status is at baseline.   Psychiatric:         Mood and Affect: Mood normal.         Behavior: Behavior normal.         Thought Content: Thought content normal.         Judgment: Judgment normal.         Labs:   Brief Urine Lab Results  (Last result in the past 365 days)        Color   Clarity   Blood   Leuk Est   Nitrite   Protein   CREAT   Urine HCG        05/13/24 1331             284.0                 Urine Culture          12/21/2023    10:47 12/27/2023    15:03   Urine Culture   Urine Culture 50,000 CFU/mL Mixed Ava Isolated  No growth         Lab Results   Component Value Date    GLUCOSE 154 (H) 06/03/2024    CALCIUM 9.0 06/03/2024     06/03/2024    K 3.9 06/03/2024    CO2 22.6 06/03/2024     06/03/2024    BUN 7 06/03/2024    CREATININE 0.60 06/03/2024    EGFRIFAFRI 135 06/02/2021    EGFRIFNONA 117 06/02/2021    BCR 11.7 06/03/2024    ANIONGAP 10.4 06/03/2024       Lab Results   Component Value Date    WBC 6.54 06/03/2024    HGB 13.5 06/03/2024    HCT 40.4 06/03/2024    MCV 87.3 06/03/2024     06/03/2024         Images:   US Renal Bilateral    Result Date: 3/18/2024  Impression: Unremarkable renal ultrasound Electronically Signed: Sven Bond MD  3/18/2024 8:31 AM EDT  Workstation ID: [id number]    CT Abdomen Pelvis  With & Without Contrast    Result Date: 2/12/2024  Impression: 1. Hepatic steatosis and hepatomegaly 2. No CT abnormalities in the kidneys or collecting system Electronically Signed: Sven Bond MD  2/12/2024 9:54 AM EST  Workstation ID: [id number]      Measures:   Tobacco:   Yasmin Singleton  reports that she has been smoking cigarettes. She started smoking about 18 years ago. She has a 9.2 pack-year smoking history. She has been exposed to tobacco smoke. She has never used smokeless tobacco. I have educated her on the risk of diseases from using tobacco products.     Assessment / Plan      Assessment/Plan:   Yasmin Singleton is a 38 y.o. female who presented today with nephrolithiasis/ureterolithiasis.  She reports recent intermittent right greater than left flank pain.  Given her prior history of nephrolithiasis/ureterolithiasis we have discussed the indication for cross-sectional imaging for further evaluation.  We will obtain CT stone protocol, have her follow-up for further discussion management of symptoms once imaging is completed.  She is understanding agreeable    Diagnoses and all orders for this visit:    1. Flank pain (Primary)  -     CT Abdomen Pelvis Stone Protocol; Future           Follow Up:   Return in about 1 week (around 6/14/2024) for Recheck.    I spent approximately 20 minutes providing clinical care for this patient; including review of patient's chart and provider documentation, face to face time spent with patient in examination room (obtaining history, performing physical exam, discussing diagnosis and management options), placing orders, and completing patient documentation.     Deon Dunbar MD  [facility] [place]

## 2024-06-10 ENCOUNTER — TELEPHONE (OUTPATIENT)
Age: 39
End: 2024-06-10
Payer: COMMERCIAL

## 2024-06-10 ENCOUNTER — TELEPHONE (OUTPATIENT)
Dept: UROLOGY | Facility: CLINIC | Age: 39
End: 2024-06-10
Payer: COMMERCIAL

## 2024-06-10 NOTE — TELEPHONE ENCOUNTER
Hub staff attempted to follow warm transfer process and was unsuccessful     Caller: KARLA LOWRY    Relationship to patient: SELF    Best call back number: [phone]    Patient is needing: PATIENT IS RETURNING CALL. SHE WOULD LIKE A CALL BACK FOR CLARIFICATION. SHE WAS UNDER THE IMPRESSION SHE WAS SCHEDULE FOR A CT ON 06/14/24. I INFORMED HER IT'S A FOLLOW UP FOR A ONE WEEK RECHECK. SHE DOESN'T UNDERSTAND WHY SHE NEEDS TO BE RECHECKED WHEN SHE JUST SAW DR FELIX 06/07/24. PLEASE CALL PATIENT BACK FOR ASSISTANCE.

## 2024-06-10 NOTE — TELEPHONE ENCOUNTER
Tried to contact pt but VM was full. Patient can give central scheduling a call to get her imaging scheduled before her upcoming appt.

## 2024-06-11 NOTE — TELEPHONE ENCOUNTER
I called pt and got the confusion cleared with the follow up/CT appt. Patient is scheduled 06/20 for imaging and 06/25 for follow up after imaging.

## 2024-06-20 ENCOUNTER — HOSPITAL ENCOUNTER (OUTPATIENT)
Dept: CT IMAGING | Facility: HOSPITAL | Age: 39
Discharge: HOME | End: 2024-06-20
Admitting: UROLOGY
Payer: COMMERCIAL

## 2024-06-20 DIAGNOSIS — R10.9 FLANK PAIN: ICD-10-CM

## 2024-06-20 PROCEDURE — 74176 CT ABD & PELVIS W/O CONTRAST: CPT

## 2024-06-25 ENCOUNTER — OFFICE VISIT (OUTPATIENT)
Dept: UROLOGY | Facility: CLINIC | Age: 39
End: 2024-06-25
Payer: COMMERCIAL

## 2024-06-25 DIAGNOSIS — N20.0 NEPHROLITHIASIS: Primary | ICD-10-CM

## 2024-06-25 DIAGNOSIS — R10.9 FLANK PAIN: ICD-10-CM

## 2024-06-25 NOTE — PROGRESS NOTES
Office Note Kidney Stone      Patient Name: Yasmin Singleton  : 1985   MRN: [id number]     Chief Complaint: History of Nephrolithiasis/Ureterolithiasis       History of Present Illness: Yasmin Singleton is a 38 y.o. female who presents today for follow-up with CT cross-sectional imaging to evaluate for potential nephrolithiasis ureterolithiasis.  Patient has had recent flank pain, prior history of ureteral stone.  CT imaging has revealed no evidence of nephrolithiasis, ureterolithiasis or hydronephrosis.  She denies bothersome lower urinary tract symptoms.  Continues to report intermittent right flank, back pain.      Subjective      Review of System: Review of Systems   Genitourinary:  Positive for frequency. Negative for decreased urine volume, difficulty urinating, dysuria, enuresis, flank pain, hematuria and urgency.        I have reviewed the ROS documented by my clinical staff, updated as appropriate and I agree. Deon Dunbar MD    Past Medical History:   Past Medical History:   Diagnosis Date    Diabetes     GERD (gastroesophageal reflux disease)     History of recurrent UTIs     Kidney stone     Seasonal allergies        Past Surgical History:   Past Surgical History:   Procedure Laterality Date    KIDNEY STONE SURGERY  2024    WISDOM TOOTH EXTRACTION         Family History:   Family History   Problem Relation Age of Onset    Kidney failure Mother     Cancer Father         Unsure what kind    Hypertension Father     Stroke Father     Breast cancer Neg Hx     Ovarian cancer Neg Hx        Social History:   Social History     Socioeconomic History    Marital status: Single   Tobacco Use    Smoking status: Every Day     Current packs/day: 0.50     Average packs/day: 0.5 packs/day for 18.5 years (9.2 ttl pk-yrs)     Types: Cigarettes     Start date:      Passive exposure: Past    Smokeless tobacco: Never   Vaping Use    Vaping status: Never Used   Substance and Sexual Activity     Alcohol use: Yes     Comment: on occasion, at most 1-2 drinks a month    Drug use: Yes     Frequency: 4.0 times per week     Types: Marijuana    Sexual activity: Not Currently     Partners: Male     Birth control/protection: None       Medications:     Current Outpatient Medications:     escitalopram (Lexapro) 5 MG tablet, Take 1 tablet by mouth Daily., Disp: 30 tablet, Rfl: 5    glipizide (Glucotrol) 5 MG tablet, Take 1 tablet by mouth 2 (Two) Times a Day Before Meals., Disp: 180 tablet, Rfl: 1    glucose blood test strip, 1 each by Other route 2 (Two) Times a Day With Meals. Use as instructed, Disp: 100 each, Rfl: 5    Lancets (freestyle) lancets, 1 each by Other route Daily. Use as instructed, Disp: 100 each, Rfl: 5    levothyroxine (SYNTHROID, LEVOTHROID) 100 MCG tablet, Take 1 tablet by mouth Daily., Disp: 90 tablet, Rfl: 1    loratadine (CLARITIN) 10 MG tablet, Take 1 tablet by mouth Daily., Disp: 30 tablet, Rfl: 5    metFORMIN ER (GLUCOPHAGE-XR) 750 MG 24 hr tablet, TAKE 1 TABLET BY MOUTH DAILY WITH BREAKFAST, Disp: 90 tablet, Rfl: 1    pantoprazole (PROTONIX) 20 MG EC tablet, Take 1 tablet by mouth Daily., Disp: 90 tablet, Rfl: 1    Prenatal Vit-Fe Fumarate-FA (Prenatal 27-1) 27-1 MG tablet tablet, Take 1 tablet by mouth Daily., Disp: 90 each, Rfl: 2    spironolactone (ALDACTONE) 50 MG tablet, , Disp: , Rfl:     vitamin D (ERGOCALCIFEROL) 1.25 MG (67979 UT) capsule capsule, TAKE 1 CAPSULE BY MOUTH ONCE WEEKLY, Disp: 12 capsule, Rfl: 1    ketorolac (TORADOL) 10 MG tablet, Take 1 tablet by mouth Every 6 (Six) Hours As Needed for Moderate Pain. (Patient not taking: Reported on 6/7/2024), Disp: 15 tablet, Rfl: 0    oxybutynin XL (DITROPAN-XL) 5 MG 24 hr tablet, TAKE 1 TABLET BY MOUTH DAILY AS NEEDED FOR BLADDER SPASM (Patient not taking: Reported on 6/25/2024), Disp: 14 tablet, Rfl: 0    phenazopyridine (Pyridium) 200 MG tablet, Take 1 tablet by mouth 3 (Three) Times a Day As Needed for Bladder Spasms. (Patient  not taking: Reported on 6/7/2024), Disp: 20 tablet, Rfl: 0    Allergies:   No Known Allergies    Objective     Physical Exam:   Vital Signs: There were no vitals filed for this visit.  There is no height or weight on file to calculate BMI.     Physical Exam  Vitals and nursing note reviewed.   Constitutional:       Appearance: Normal appearance.   HENT:      Head: Normocephalic and atraumatic.   Cardiovascular:      Comments: Well perfused  Pulmonary:      Effort: Pulmonary effort is normal.   Abdominal:      General: Abdomen is flat.      Palpations: Abdomen is soft.   Musculoskeletal:         General: Normal range of motion.   Skin:     General: Skin is warm and dry.   Neurological:      General: No focal deficit present.      Mental Status: She is alert and oriented to person, place, and time. Mental status is at baseline.   Psychiatric:         Mood and Affect: Mood normal.         Behavior: Behavior normal.         Thought Content: Thought content normal.         Judgment: Judgment normal.         Labs:   Brief Urine Lab Results  (Last result in the past 365 days)        Color   Clarity   Blood   Leuk Est   Nitrite   Protein   CREAT   Urine HCG        05/13/24 1331             284.0                 Urine Culture          12/21/2023    10:47 12/27/2023    15:03   Urine Culture   Urine Culture 50,000 CFU/mL Mixed Ava Isolated  No growth         Lab Results   Component Value Date    GLUCOSE 154 (H) 06/03/2024    CALCIUM 9.0 06/03/2024     06/03/2024    K 3.9 06/03/2024    CO2 22.6 06/03/2024     06/03/2024    BUN 7 06/03/2024    CREATININE 0.60 06/03/2024    EGFRIFAFRI 135 06/02/2021    EGFRIFNONA 117 06/02/2021    BCR 11.7 06/03/2024    ANIONGAP 10.4 06/03/2024       Lab Results   Component Value Date    WBC 6.54 06/03/2024    HGB 13.5 06/03/2024    HCT 40.4 06/03/2024    MCV 87.3 06/03/2024     06/03/2024         Images:   CT Abdomen Pelvis Stone Protocol    Result Date:  6/20/2024  Impression: No acute intra-abdominal process evident. Findings compatible with mild to moderate hepatic steatosis. Electronically Signed: Sulaiman Dooley MD  6/20/2024 4:24 PM EDT  Workstation ID: [id number]    US Renal Bilateral    Result Date: 3/18/2024  Impression: Unremarkable renal ultrasound Electronically Signed: Sven Bond MD  3/18/2024 8:31 AM EDT  Workstation ID: [id number]      Measures:   Tobacco:   Yasmin Singleton  reports that she has been smoking cigarettes. She started smoking about 18 years ago. She has a 9.2 pack-year smoking history. She has been exposed to tobacco smoke. She has never used smokeless tobacco. I have educated her on the risk of diseases from using tobacco products.     Assessment / Plan      Assessment/Plan:   Yasmin Singleton is a 38 y.o. female who presented today with nephrolithiasis/ureterolithiasis.  Updated CT imaging demonstrates no evidence of nephrolithiasis, ureterolithiasis or hydronephrosis.  We have continued to encourage conservative strategies to reduce stone risk in the future.  At this time we have recommended follow-up with primary care physician for further evaluation of potential intermittent flank pain as we do not feel is related to  system, stone disease.  She is understanding agreeable.  She will follow-up in 1 year, alert with any acute stone symptoms.    Diagnoses and all orders for this visit:    1. Nephrolithiasis (Primary)  -     US Renal Bilateral; Future    2. Flank pain             Follow Up:   Return in about 1 year (around 6/25/2025) for Recheck.    I spent approximately 20 minutes providing clinical care for this patient; including review of patient's chart and provider documentation, face to face time spent with patient in examination room (obtaining history, performing physical exam, discussing diagnosis and management options), placing orders, and completing patient documentation.     Deon Dunbar MD  [facility] [place]

## 2024-06-26 PROBLEM — R10.9 FLANK PAIN: Status: ACTIVE | Noted: 2024-06-26

## 2024-07-23 ENCOUNTER — PATIENT ROUNDING (BHMG ONLY) (OUTPATIENT)
Dept: URGENT CARE | Facility: CLINIC | Age: 39
End: 2024-07-23
Payer: COMMERCIAL

## 2024-07-23 NOTE — ED NOTES
Thank you for letting us care for you in your recent visit to our urgent care center. We would love to hear about your experience with us. Was this the first time you have visited our location?    We’re always looking for ways to make our patients’ experiences even better. Do you have any recommendations on ways we may improve?     I appreciate you taking the time to respond. Please be on the lookout for a survey about your recent visit from [employer] via text or email. We would greatly appreciate if you could fill that out and turn it back in. We want your voice to be heard and we value your feedback.   Thank you for choosing [facility] for your healthcare needs.

## 2024-08-07 RX ORDER — GLIPIZIDE 5 MG/1
5 TABLET ORAL
Qty: 180 TABLET | Refills: 1 | Status: SHIPPED | OUTPATIENT
Start: 2024-08-07

## 2024-08-26 RX ORDER — ESCITALOPRAM OXALATE 5 MG/1
5 TABLET ORAL DAILY
Qty: 90 TABLET | Refills: 1 | Status: SHIPPED | OUTPATIENT
Start: 2024-08-26

## 2024-08-26 RX ORDER — PANTOPRAZOLE SODIUM 20 MG/1
20 TABLET, DELAYED RELEASE ORAL DAILY
Qty: 90 TABLET | Refills: 1 | Status: SHIPPED | OUTPATIENT
Start: 2024-08-26

## 2024-09-05 ENCOUNTER — OFFICE VISIT (OUTPATIENT)
Dept: INTERNAL MEDICINE | Facility: CLINIC | Age: 39
End: 2024-09-05
Payer: COMMERCIAL

## 2024-09-05 VITALS
WEIGHT: 197.8 LBS | OXYGEN SATURATION: 98 % | HEART RATE: 100 BPM | BODY MASS INDEX: 35.05 KG/M2 | TEMPERATURE: 98.2 F | SYSTOLIC BLOOD PRESSURE: 114 MMHG | HEIGHT: 63 IN | DIASTOLIC BLOOD PRESSURE: 66 MMHG

## 2024-09-05 DIAGNOSIS — E55.9 VITAMIN D DEFICIENCY: ICD-10-CM

## 2024-09-05 DIAGNOSIS — E11.9 TYPE 2 DIABETES MELLITUS WITHOUT COMPLICATION, WITHOUT LONG-TERM CURRENT USE OF INSULIN: ICD-10-CM

## 2024-09-05 DIAGNOSIS — K21.9 GASTROESOPHAGEAL REFLUX DISEASE WITHOUT ESOPHAGITIS: ICD-10-CM

## 2024-09-05 DIAGNOSIS — E03.9 HYPOTHYROIDISM, UNSPECIFIED TYPE: ICD-10-CM

## 2024-09-05 DIAGNOSIS — Z00.00 HEALTH CARE MAINTENANCE: ICD-10-CM

## 2024-09-05 DIAGNOSIS — G56.02 PARTIAL THENAR ATROPHY OF LEFT HAND: ICD-10-CM

## 2024-09-05 DIAGNOSIS — F41.1 GAD (GENERALIZED ANXIETY DISORDER): Primary | ICD-10-CM

## 2024-09-05 LAB
ALBUMIN SERPL-MCNC: 4.3 G/DL (ref 3.5–5.2)
ALBUMIN/GLOB SERPL: 1.7 G/DL
ALP SERPL-CCNC: 72 U/L (ref 39–117)
ALT SERPL W P-5'-P-CCNC: 17 U/L (ref 1–33)
ANION GAP SERPL CALCULATED.3IONS-SCNC: 10 MMOL/L (ref 5–15)
AST SERPL-CCNC: 20 U/L (ref 1–32)
BILIRUB SERPL-MCNC: 0.2 MG/DL (ref 0–1.2)
BUN SERPL-MCNC: 6 MG/DL (ref 6–20)
BUN/CREAT SERPL: 9 (ref 7–25)
CALCIUM SPEC-SCNC: 9.4 MG/DL (ref 8.6–10.5)
CHLORIDE SERPL-SCNC: 103 MMOL/L (ref 98–107)
CHOLEST SERPL-MCNC: 159 MG/DL (ref 0–200)
CO2 SERPL-SCNC: 25 MMOL/L (ref 22–29)
CREAT SERPL-MCNC: 0.67 MG/DL (ref 0.57–1)
DEPRECATED RDW RBC AUTO: 45 FL (ref 37–54)
EGFRCR SERPLBLD CKD-EPI 2021: 115.6 ML/MIN/1.73
ERYTHROCYTE [DISTWIDTH] IN BLOOD BY AUTOMATED COUNT: 14 % (ref 12.3–15.4)
GLOBULIN UR ELPH-MCNC: 2.6 GM/DL
GLUCOSE SERPL-MCNC: 146 MG/DL (ref 65–99)
HBA1C MFR BLD: 6.8 % (ref 4.8–5.6)
HCT VFR BLD AUTO: 39.5 % (ref 34–46.6)
HDLC SERPL-MCNC: 46 MG/DL (ref 40–60)
HGB BLD-MCNC: 12.7 G/DL (ref 12–15.9)
LDLC SERPL CALC-MCNC: 100 MG/DL (ref 0–100)
LDLC/HDLC SERPL: 2.18 {RATIO}
MCH RBC QN AUTO: 28.3 PG (ref 26.6–33)
MCHC RBC AUTO-ENTMCNC: 32.2 G/DL (ref 31.5–35.7)
MCV RBC AUTO: 88.2 FL (ref 79–97)
PLATELET # BLD AUTO: 247 10*3/MM3 (ref 140–450)
PMV BLD AUTO: 9.5 FL (ref 6–12)
POTASSIUM SERPL-SCNC: 3.8 MMOL/L (ref 3.5–5.2)
PROT SERPL-MCNC: 6.9 G/DL (ref 6–8.5)
RBC # BLD AUTO: 4.48 10*6/MM3 (ref 3.77–5.28)
SODIUM SERPL-SCNC: 138 MMOL/L (ref 136–145)
TRIGL SERPL-MCNC: 63 MG/DL (ref 0–150)
TSH SERPL DL<=0.05 MIU/L-ACNC: 1.84 UIU/ML (ref 0.27–4.2)
VLDLC SERPL-MCNC: 13 MG/DL (ref 5–40)
WBC NRBC COR # BLD AUTO: 7.63 10*3/MM3 (ref 3.4–10.8)

## 2024-09-05 PROCEDURE — 83036 HEMOGLOBIN GLYCOSYLATED A1C: CPT | Performed by: PHYSICIAN ASSISTANT

## 2024-09-05 PROCEDURE — 80050 GENERAL HEALTH PANEL: CPT | Performed by: PHYSICIAN ASSISTANT

## 2024-09-05 PROCEDURE — 82607 VITAMIN B-12: CPT | Performed by: PHYSICIAN ASSISTANT

## 2024-09-05 PROCEDURE — 80061 LIPID PANEL: CPT | Performed by: PHYSICIAN ASSISTANT

## 2024-09-05 PROCEDURE — 82306 VITAMIN D 25 HYDROXY: CPT | Performed by: PHYSICIAN ASSISTANT

## 2024-09-05 PROCEDURE — 82746 ASSAY OF FOLIC ACID SERUM: CPT | Performed by: PHYSICIAN ASSISTANT

## 2024-09-05 PROCEDURE — 99214 OFFICE O/P EST MOD 30 MIN: CPT | Performed by: PHYSICIAN ASSISTANT

## 2024-09-05 RX ORDER — METFORMIN HYDROCHLORIDE 750 MG/1
750 TABLET, EXTENDED RELEASE ORAL
Qty: 90 TABLET | Refills: 1 | Status: SHIPPED | OUTPATIENT
Start: 2024-09-05

## 2024-09-05 RX ORDER — ERGOCALCIFEROL 1.25 MG/1
50000 CAPSULE, LIQUID FILLED ORAL WEEKLY
Qty: 12 CAPSULE | Refills: 1 | Status: SHIPPED | OUTPATIENT
Start: 2024-09-05

## 2024-09-05 RX ORDER — SEMAGLUTIDE 1.34 MG/ML
0.25 INJECTION, SOLUTION SUBCUTANEOUS WEEKLY
Qty: 3 ML | Refills: 1 | Status: SHIPPED | OUTPATIENT
Start: 2024-09-05

## 2024-09-05 RX ORDER — LEVOTHYROXINE SODIUM 100 UG/1
100 TABLET ORAL DAILY
Qty: 90 TABLET | Refills: 1 | Status: SHIPPED | OUTPATIENT
Start: 2024-09-05

## 2024-09-05 RX ORDER — GLIPIZIDE 5 MG/1
5 TABLET ORAL
Qty: 180 TABLET | Refills: 1 | Status: SHIPPED | OUTPATIENT
Start: 2024-09-05

## 2024-09-05 RX ORDER — PANTOPRAZOLE SODIUM 20 MG/1
20 TABLET, DELAYED RELEASE ORAL DAILY
Qty: 90 TABLET | Refills: 1 | Status: SHIPPED | OUTPATIENT
Start: 2024-09-05

## 2024-09-05 RX ORDER — SPIRONOLACTONE 50 MG/1
50 TABLET, FILM COATED ORAL DAILY
Qty: 90 TABLET | Refills: 1 | Status: SHIPPED | OUTPATIENT
Start: 2024-09-05

## 2024-09-05 RX ORDER — ESCITALOPRAM OXALATE 5 MG/1
5 TABLET ORAL DAILY
Qty: 90 TABLET | Refills: 1 | Status: SHIPPED | OUTPATIENT
Start: 2024-09-05

## 2024-09-05 NOTE — PROGRESS NOTES
[facility]  [address]  [facility] [address]  Dept: [phone]  Dept Fax: [phone]  Loc: [phone]  Loc Fax: [phone]    Yasmin Singleton  1985    Follow Up Office Visit Note    History of Present Illness:  Patient is a 38-year-old female in today to follow-up for diabetes, anxiety, hypothyroidism, and GERD.  On Protonix daily as directed without any problems or side effects.  Reports good symptomatic control.    Has not been on metformin and glipizide due to work schedule.  Needing A1c rechecked.    Needing refill of Lexapro.    Needing refill of Synthroid.  Needing TSH rechecked.    Patient states that she was in urgent care recently for sprain of left thumb.  Patient now having difficulty gripping a utensil with that hand.        The following portions of the patient's history were reviewed and updated as appropriate: allergies, current medications, past family history, past medical history, past social history, past surgical history, and problem list.    Medications:    Current Outpatient Medications:     escitalopram (LEXAPRO) 5 MG tablet, Take 1 tablet by mouth Daily., Disp: 90 tablet, Rfl: 1    glipizide (GLUCOTROL) 5 MG tablet, Take 1 tablet by mouth 2 (Two) Times a Day Before Meals., Disp: 180 tablet, Rfl: 1    glucose blood test strip, 1 each by Other route 2 (Two) Times a Day With Meals. Use as instructed, Disp: 100 each, Rfl: 5    Lancets (freestyle) lancets, 1 each by Other route Daily. Use as instructed, Disp: 100 each, Rfl: 5    levothyroxine (SYNTHROID, LEVOTHROID) 100 MCG tablet, Take 1 tablet by mouth Daily., Disp: 90 tablet, Rfl: 1    loratadine (CLARITIN) 10 MG tablet, Take 1 tablet by mouth Daily., Disp: 30 tablet, Rfl: 5    meloxicam (MOBIC) 7.5 MG tablet, Take 1 tablet by mouth Daily., Disp: 30 tablet, Rfl: 0    metFORMIN ER (GLUCOPHAGE-XR) 750 MG 24 hr tablet, Take 1 tablet by mouth Daily With Breakfast., Disp:  90 tablet, Rfl: 1    pantoprazole (PROTONIX) 20 MG EC tablet, Take 1 tablet by mouth Daily., Disp: 90 tablet, Rfl: 1    Prenatal Vit-Fe Fumarate-FA (Prenatal 27-1) 27-1 MG tablet tablet, Take 1 tablet by mouth Daily., Disp: 90 each, Rfl: 2    spironolactone (ALDACTONE) 50 MG tablet, Take 1 tablet by mouth Daily., Disp: 90 tablet, Rfl: 1    vitamin D (ERGOCALCIFEROL) 1.25 MG (08658 UT) capsule capsule, Take 1 capsule by mouth 1 (One) Time Per Week., Disp: 12 capsule, Rfl: 1    Semaglutide,0.25 or 0.5MG/DOS, (Ozempic, 0.25 or 0.5 MG/DOSE,) 2 MG/1.5ML solution pen-injector, Inject 0.25 mg under the skin into the appropriate area as directed 1 (One) Time Per Week., Disp: 3 mL, Rfl: 1    Subjective  No Known Allergies     Past Medical History:   Diagnosis Date    Diabetes     GERD (gastroesophageal reflux disease)     History of recurrent UTIs     Kidney stone     Seasonal allergies        Past Surgical History:   Procedure Laterality Date    KIDNEY STONE SURGERY  02/22/2024    WISDOM TOOTH EXTRACTION  2015       Family History   Problem Relation Age of Onset    Kidney failure Mother     Cancer Father         Unsure what kind    Hypertension Father     Stroke Father     Breast cancer Neg Hx     Ovarian cancer Neg Hx         Social History     Socioeconomic History    Marital status: Single   Tobacco Use    Smoking status: Every Day     Current packs/day: 0.50     Average packs/day: 0.5 packs/day for 18.7 years (9.3 ttl pk-yrs)     Types: Cigarettes     Start date: 2006     Passive exposure: Past    Smokeless tobacco: Never   Vaping Use    Vaping status: Never Used   Substance and Sexual Activity    Alcohol use: Yes     Comment: on occasion, at most 1-2 drinks a month    Drug use: Yes     Frequency: 4.0 times per week     Types: Marijuana    Sexual activity: Not Currently     Partners: Male     Birth control/protection: None       Review of Systems   Constitutional:  Negative for activity change, chills, fatigue, fever  "and unexpected weight change.   HENT:  Negative for congestion, ear pain, postnasal drip, sinus pressure and sore throat.    Eyes:  Negative for pain, discharge and redness.   Respiratory:  Negative for cough, shortness of breath and wheezing.    Cardiovascular:  Negative for chest pain, palpitations and leg swelling.   Gastrointestinal:  Negative for diarrhea, nausea and vomiting.   Endocrine: Negative for cold intolerance and heat intolerance.   Genitourinary:  Negative for decreased urine volume and dysuria.   Musculoskeletal:  Negative for arthralgias and myalgias.   Skin:  Negative for rash and wound.   Neurological:  Positive for weakness. Negative for dizziness, light-headedness and headaches.   Hematological:  Does not bruise/bleed easily.   Psychiatric/Behavioral:  Negative for confusion, dysphoric mood and sleep disturbance. The patient is not nervous/anxious.          Objective  Vitals:    09/05/24 1455   BP: 114/66   BP Location: Left arm   Patient Position: Sitting   Cuff Size: Large Adult   Pulse: 100   Temp: 98.2 °F (36.8 °C)   TempSrc: Temporal   SpO2: 98%   Weight: 89.7 kg (197 lb 12.8 oz)   Height: 160 cm (62.99\")     Body mass index is 35.05 kg/m².     Physical Exam  Physical Exam  Vitals and nursing note reviewed.   Constitutional:       General: She is not in acute distress.     Appearance: She is not ill-appearing.   HENT:      Head: Normocephalic.      Right Ear: Tympanic membrane, ear canal and external ear normal. There is no impacted cerumen.      Left Ear: Tympanic membrane, ear canal and external ear normal. There is no impacted cerumen.      Nose: No congestion or rhinorrhea.      Mouth/Throat:      Mouth: Mucous membranes are moist.      Pharynx: Oropharynx is clear. No oropharyngeal exudate or posterior oropharyngeal erythema.   Eyes:      General:         Right eye: No discharge.         Left eye: No discharge.      Extraocular Movements: Extraocular movements intact.      " Conjunctiva/sclera: Conjunctivae normal.      Pupils: Pupils are equal, round, and reactive to light.   Cardiovascular:      Rate and Rhythm: Normal rate and regular rhythm.      Heart sounds: Normal heart sounds. No murmur heard.     No friction rub. No gallop.   Pulmonary:      Effort: Pulmonary effort is normal. No respiratory distress.      Breath sounds: Normal breath sounds. No wheezing.   Abdominal:      General: Bowel sounds are normal. There is no distension.      Palpations: Abdomen is soft. There is no mass.      Tenderness: There is no abdominal tenderness.   Musculoskeletal:         General: No swelling. Normal range of motion.      Cervical back: Normal range of motion. No tenderness.      Right lower leg: No edema.      Left lower leg: No edema.      Comments: Atrophy of thenar eminence of left hand on exam.   Lymphadenopathy:      Cervical: No cervical adenopathy.   Skin:     Findings: No bruising, erythema or rash.   Neurological:      Mental Status: She is oriented to person, place, and time.      Gait: Gait normal.   Psychiatric:         Mood and Affect: Mood normal.         Behavior: Behavior normal.         Thought Content: Thought content normal.         Judgment: Judgment normal.         Diagnostic Data  Procedures    Assessment  Diagnoses and all orders for this visit:    1. JACKIE (generalized anxiety disorder) (Primary)    2. Vitamin D deficiency  -     vitamin D (ERGOCALCIFEROL) 1.25 MG (81982 UT) capsule capsule; Take 1 capsule by mouth 1 (One) Time Per Week.  Dispense: 12 capsule; Refill: 1    3. Type 2 diabetes mellitus without complication, without long-term current use of insulin  -     metFORMIN ER (GLUCOPHAGE-XR) 750 MG 24 hr tablet; Take 1 tablet by mouth Daily With Breakfast.  Dispense: 90 tablet; Refill: 1    4. Hypothyroidism, unspecified type    5. Gastroesophageal reflux disease without esophagitis    6. Health care maintenance  -     Vitamin B12 & Folate  -     Vitamin  D,25-Hydroxy; Future  -     Lipid Panel  -     Hemoglobin A1c; Future  -     TSH Rfx On Abnormal To Free T4  -     Comprehensive Metabolic Panel  -     CBC (No Diff)    7. Partial thenar atrophy of left hand  -     Ambulatory Referral to Hand Surgery    Other orders  -     spironolactone (ALDACTONE) 50 MG tablet; Take 1 tablet by mouth Daily.  Dispense: 90 tablet; Refill: 1  -     pantoprazole (PROTONIX) 20 MG EC tablet; Take 1 tablet by mouth Daily.  Dispense: 90 tablet; Refill: 1  -     levothyroxine (SYNTHROID, LEVOTHROID) 100 MCG tablet; Take 1 tablet by mouth Daily.  Dispense: 90 tablet; Refill: 1  -     glipizide (GLUCOTROL) 5 MG tablet; Take 1 tablet by mouth 2 (Two) Times a Day Before Meals.  Dispense: 180 tablet; Refill: 1  -     escitalopram (LEXAPRO) 5 MG tablet; Take 1 tablet by mouth Daily.  Dispense: 90 tablet; Refill: 1  -     Semaglutide,0.25 or 0.5MG/DOS, (Ozempic, 0.25 or 0.5 MG/DOSE,) 2 MG/1.5ML solution pen-injector; Inject 0.25 mg under the skin into the appropriate area as directed 1 (One) Time Per Week.  Dispense: 3 mL; Refill: 1        Plan    1. JACKIE (generalized anxiety disorder) (Primary)- refilled Lexapro.    2. Vitamin D deficiency- stable on vitamin D.  Repeat vitamin D level.    3. Type 2 diabetes mellitus without complication, without long-term current use of insulin- restart metformin and glipizide.  Repeat A1c.    4. Hypothyroidism, unspecified type refilled- Synthroid.  Repeat TSH.    5. Gastroesophageal reflux disease without esophagitis- stable on Protonix.  Refilled med.  Continue to monitor.    6. Health care maintenance- ordered fasting labs.    7. Partial thenar atrophy of left hand- referred to hand specialist and physical therapy.      Return in about 4 weeks (around 10/3/2024) for Recheck.    Gerson Sifuentes PA-C  09/05/2024

## 2024-09-06 LAB
25(OH)D3 SERPL-MCNC: 20.3 NG/ML (ref 30–100)
FOLATE SERPL-MCNC: 7.16 NG/ML (ref 4.78–24.2)
VIT B12 BLD-MCNC: 385 PG/ML (ref 211–946)

## 2024-09-06 RX ORDER — LANOLIN ALCOHOL/MO/W.PET/CERES
1000 CREAM (GRAM) TOPICAL DAILY
Qty: 90 TABLET | Refills: 1 | Status: SHIPPED | OUTPATIENT
Start: 2024-09-06

## 2024-09-11 ENCOUNTER — OFFICE VISIT (OUTPATIENT)
Dept: ORTHOPEDIC SURGERY | Facility: CLINIC | Age: 39
End: 2024-09-11
Payer: COMMERCIAL

## 2024-09-11 VITALS
SYSTOLIC BLOOD PRESSURE: 100 MMHG | DIASTOLIC BLOOD PRESSURE: 60 MMHG | HEIGHT: 63 IN | BODY MASS INDEX: 34.91 KG/M2 | WEIGHT: 197 LBS

## 2024-09-11 DIAGNOSIS — M79.642 LEFT HAND PAIN: Primary | ICD-10-CM

## 2024-09-11 PROCEDURE — 99204 OFFICE O/P NEW MOD 45 MIN: CPT | Performed by: PLASTIC SURGERY

## 2024-09-11 NOTE — PROGRESS NOTES
[facility] Orthopedic     Office Visit       Date: 09/11/2024   Patient Name: Yasmin Singleton  MRN: [id number]  YOB: 1985    Referring Physician: Gerson Sifuentes, *     Chief Complaint:   Chief Complaint   Patient presents with    Left Hand - Pain       History of Present Illness:   Yasmin Singleton is a 38 y.o. female left-hand-dominant presents for evaluation of left thumb pain of 2 months duration.  Patient reports that she hyperextended her left thumb at the IP joint 2 months ago.  Reports she had medial pain and swelling.  Reports that her pain is significantly improved however she still has some pain with pinch and .  She is otherwise healthy.  She works in maintenance.  She smokes 1/2 packs of cigarettes per day.      Subjective   Review of Systems:   Review of Systems   Constitutional: Negative.  Negative for chills, fatigue and fever.   HENT: Negative.  Negative for congestion and dental problem.    Eyes: Negative.  Negative for blurred vision.   Respiratory: Negative.  Negative for shortness of breath.    Cardiovascular: Negative.  Negative for leg swelling.   Gastrointestinal: Negative.  Negative for abdominal pain.   Endocrine: Negative.  Negative for polyuria.   Genitourinary: Negative.  Negative for difficulty urinating.   Musculoskeletal:  Positive for arthralgias.   Skin: Negative.    Allergic/Immunologic: Negative.    Neurological: Negative.    Hematological: Negative.  Negative for adenopathy.   Psychiatric/Behavioral: Negative.  Negative for behavioral problems.         Pertinent review of systems per HPI.     I reviewed the patient's chief complaint, history of present illness, review of systems, past medical history, surgical history, family history, social history, medications and allergy list in the EMR on 09/11/2024 and agree with the findings above.    Objective    Vital Signs:   Vitals:     "09/11/24 1307   BP: 100/60   Weight: 89.4 kg (197 lb)   Height: 160 cm (62.99\")     BMI: Body mass index is 34.91 kg/m².    General Appearance: No acute distress. Alert and oriented.     Chest:  Non-labored breathing on room air. Regular rate and rhythm.    Upper Extremity Exam:    Nontender palpation throughout the left thumb.  No tenderness over the A1 pulley.  Negative Finkelstein's test.  Nontender over the thumb CMC joint.  Nontender to palpation over the thumb IP joint.    Fingers are warm, well-perfused with appropriate capillary refill.  Palpable radial pulse.    Sensation intact to light touch in median, radial and ulnar nerve distributions.    Motor- Fires [employer], ulnar intrinsics, EPL/EDC w/ full active and passive range of motion. Strength intact.    Non-tender except for in the areas highlighted    Imaging/Studies:   Imaging Results (Last 24 Hours)       ** No results found for the last 24 hours. **            X-ray of the left thumb from 7/11/2024 is independently reviewed and interpreted myself and demonstrates no evidence of bony abnormality.    Procedures:  Procedures    Quality Measures:   ACP:   ACP discussion was deferred.    Tobacco:   Yasmin Singleton  reports that she has been smoking cigarettes. She started smoking about 18 years ago. She has a 9.3 pack-year smoking history. She has been exposed to tobacco smoke. She has never used smokeless tobacco.      Assessment / Plan    Assessment/Plan:     There are no diagnoses linked to this encounter.     Yasmin Contrerass a 38 y.o. female who presents with:      ICD-10-CM ICD-9-CM   1. Left hand pain  M79.642 729.5         Patient presents with left thumb pain at the DIP after hyperextension injury 2 months ago.  She likely sustained a ligamentous injury/sprain to her left thumb DIP that is still recovering.  We discussed the pathophysiology of ligamentous injuries of the thumb as well as for the options for treatment.  Recommend continue " observation and home exercise program.  Recommend follow-up in 1 month.  Patient continues to have pain we will consider MRI at that time.    Follow Up:   Return in about 4 weeks (around 10/9/2024).        Jesus Gong MD  [facility]

## 2024-10-07 ENCOUNTER — OFFICE VISIT (OUTPATIENT)
Dept: INTERNAL MEDICINE | Facility: CLINIC | Age: 39
End: 2024-10-07
Payer: COMMERCIAL

## 2024-10-07 VITALS
HEART RATE: 88 BPM | SYSTOLIC BLOOD PRESSURE: 116 MMHG | HEIGHT: 63 IN | TEMPERATURE: 96.9 F | DIASTOLIC BLOOD PRESSURE: 74 MMHG | WEIGHT: 190.2 LBS | OXYGEN SATURATION: 98 % | BODY MASS INDEX: 33.7 KG/M2

## 2024-10-07 DIAGNOSIS — F41.1 GAD (GENERALIZED ANXIETY DISORDER): ICD-10-CM

## 2024-10-07 DIAGNOSIS — E03.9 HYPOTHYROIDISM, UNSPECIFIED TYPE: ICD-10-CM

## 2024-10-07 DIAGNOSIS — Z23 NEED FOR INFLUENZA VACCINATION: ICD-10-CM

## 2024-10-07 DIAGNOSIS — E11.9 TYPE 2 DIABETES MELLITUS WITHOUT COMPLICATION, WITHOUT LONG-TERM CURRENT USE OF INSULIN: Primary | ICD-10-CM

## 2024-10-07 PROCEDURE — 99214 OFFICE O/P EST MOD 30 MIN: CPT | Performed by: PHYSICIAN ASSISTANT

## 2024-10-07 PROCEDURE — 90656 IIV3 VACC NO PRSV 0.5 ML IM: CPT | Performed by: PHYSICIAN ASSISTANT

## 2024-10-07 PROCEDURE — 90471 IMMUNIZATION ADMIN: CPT | Performed by: PHYSICIAN ASSISTANT

## 2024-10-07 RX ORDER — SEMAGLUTIDE 1.34 MG/ML
0.5 INJECTION, SOLUTION SUBCUTANEOUS WEEKLY
Qty: 3 ML | Refills: 1 | Status: SHIPPED | OUTPATIENT
Start: 2024-10-07

## 2024-10-07 NOTE — PROGRESS NOTES
[facility] PR[facility]  [address]  [facility] [address]  Dept: [phone]  Dept Fax: [phone]  Loc: [phone]  Loc Fax: [phone]    Yasmin Singleton  1985    Follow Up Office Visit Note    History of Present Illness:  Patient 39-year-old female in today to follow-up for Ozempic.  Been on 0.25 mg weekly for a month now.  Needing to increase dose.  Overall doing well and feeling well.        The following portions of the patient's history were reviewed and updated as appropriate: allergies, current medications, past family history, past medical history, past social history, past surgical history, and problem list.    Medications:    Current Outpatient Medications:     escitalopram (LEXAPRO) 5 MG tablet, Take 1 tablet by mouth Daily., Disp: 90 tablet, Rfl: 1    glipizide (GLUCOTROL) 5 MG tablet, Take 1 tablet by mouth 2 (Two) Times a Day Before Meals., Disp: 180 tablet, Rfl: 1    glucose blood test strip, 1 each by Other route 2 (Two) Times a Day With Meals. Use as instructed, Disp: 100 each, Rfl: 5    Lancets (freestyle) lancets, 1 each by Other route Daily. Use as instructed, Disp: 100 each, Rfl: 5    levothyroxine (SYNTHROID, LEVOTHROID) 100 MCG tablet, Take 1 tablet by mouth Daily., Disp: 90 tablet, Rfl: 1    loratadine (CLARITIN) 10 MG tablet, Take 1 tablet by mouth Daily., Disp: 30 tablet, Rfl: 5    meloxicam (MOBIC) 7.5 MG tablet, Take 1 tablet by mouth Daily., Disp: 30 tablet, Rfl: 0    metFORMIN ER (GLUCOPHAGE-XR) 750 MG 24 hr tablet, Take 1 tablet by mouth Daily With Breakfast., Disp: 90 tablet, Rfl: 1    pantoprazole (PROTONIX) 20 MG EC tablet, Take 1 tablet by mouth Daily., Disp: 90 tablet, Rfl: 1    Prenatal Vit-Fe Fumarate-FA (Prenatal 27-1) 27-1 MG tablet tablet, Take 1 tablet by mouth Daily., Disp: 90 each, Rfl: 2    Semaglutide,0.25 or 0.5MG/DOS, (Ozempic, 0.25 or 0.5 MG/DOSE,) 2 MG/1.5ML solution pen-injector, Inject 0.5 mg under  the skin into the appropriate area as directed 1 (One) Time Per Week., Disp: 3 mL, Rfl: 1    spironolactone (ALDACTONE) 50 MG tablet, Take 1 tablet by mouth Daily., Disp: 90 tablet, Rfl: 1    vitamin B-12 (CYANOCOBALAMIN) 1000 MCG tablet, Take 1 tablet by mouth Daily., Disp: 90 tablet, Rfl: 1    vitamin D (ERGOCALCIFEROL) 1.25 MG (43418 UT) capsule capsule, Take 1 capsule by mouth 1 (One) Time Per Week., Disp: 12 capsule, Rfl: 1    Subjective  No Known Allergies     Past Medical History:   Diagnosis Date    Diabetes     GERD (gastroesophageal reflux disease)     History of recurrent UTIs     Kidney stone     Seasonal allergies        Past Surgical History:   Procedure Laterality Date    KIDNEY STONE SURGERY  02/22/2024    WISDOM TOOTH EXTRACTION  2015       Family History   Problem Relation Age of Onset    Kidney failure Mother     Cancer Father         Unsure what kind    Hypertension Father     Stroke Father     Breast cancer Neg Hx     Ovarian cancer Neg Hx         Social History     Socioeconomic History    Marital status: Single   Tobacco Use    Smoking status: Every Day     Current packs/day: 0.50     Average packs/day: 0.5 packs/day for 18.8 years (9.4 ttl pk-yrs)     Types: Cigarettes     Start date: 2006     Passive exposure: Past    Smokeless tobacco: Never   Vaping Use    Vaping status: Never Used   Substance and Sexual Activity    Alcohol use: Yes     Comment: on occasion, at most 1-2 drinks a month    Drug use: Yes     Frequency: 4.0 times per week     Types: Marijuana    Sexual activity: Not Currently     Partners: Male     Birth control/protection: None       Review of Systems   Constitutional:  Negative for activity change, chills, fatigue, fever and unexpected weight change.   HENT:  Negative for congestion, ear pain, postnasal drip, sinus pressure and sore throat.    Eyes:  Negative for pain, discharge and redness.   Respiratory:  Negative for cough, shortness of breath and wheezing.   "  Cardiovascular:  Negative for chest pain, palpitations and leg swelling.   Gastrointestinal:  Negative for diarrhea, nausea and vomiting.   Endocrine: Negative for cold intolerance and heat intolerance.   Genitourinary:  Negative for decreased urine volume and dysuria.   Musculoskeletal:  Negative for arthralgias and myalgias.   Skin:  Negative for rash and wound.   Neurological:  Negative for dizziness, light-headedness and headaches.   Hematological:  Does not bruise/bleed easily.   Psychiatric/Behavioral:  Negative for confusion, dysphoric mood and sleep disturbance. The patient is not nervous/anxious.          Objective  Vitals:    10/07/24 1418   BP: 116/74   BP Location: Left arm   Patient Position: Sitting   Cuff Size: Large Adult   Pulse: 88   Temp: 96.9 °F (36.1 °C)   TempSrc: Temporal   SpO2: 98%   Weight: 86.3 kg (190 lb 3.2 oz)   Height: 160 cm (62.99\")     Body mass index is 33.7 kg/m².     Physical Exam  Physical Exam  Vitals and nursing note reviewed.   Constitutional:       General: She is not in acute distress.     Appearance: She is not ill-appearing.   HENT:      Head: Normocephalic.      Right Ear: Tympanic membrane, ear canal and external ear normal. There is no impacted cerumen.      Left Ear: Tympanic membrane, ear canal and external ear normal. There is no impacted cerumen.      Nose: No congestion or rhinorrhea.      Mouth/Throat:      Mouth: Mucous membranes are moist.      Pharynx: Oropharynx is clear. No oropharyngeal exudate or posterior oropharyngeal erythema.   Eyes:      General:         Right eye: No discharge.         Left eye: No discharge.      Extraocular Movements: Extraocular movements intact.      Conjunctiva/sclera: Conjunctivae normal.      Pupils: Pupils are equal, round, and reactive to light.   Cardiovascular:      Rate and Rhythm: Normal rate and regular rhythm.      Heart sounds: Normal heart sounds. No murmur heard.     No friction rub. No gallop.   Pulmonary:      " Effort: Pulmonary effort is normal. No respiratory distress.      Breath sounds: Normal breath sounds. No wheezing.   Abdominal:      General: Bowel sounds are normal. There is no distension.      Palpations: Abdomen is soft. There is no mass.      Tenderness: There is no abdominal tenderness.   Musculoskeletal:         General: No swelling. Normal range of motion.      Cervical back: Normal range of motion. No tenderness.      Right lower leg: No edema.      Left lower leg: No edema.   Lymphadenopathy:      Cervical: No cervical adenopathy.   Skin:     Findings: No bruising, erythema or rash.   Neurological:      Mental Status: She is oriented to person, place, and time.      Gait: Gait normal.   Psychiatric:         Mood and Affect: Mood normal.         Behavior: Behavior normal.         Thought Content: Thought content normal.         Judgment: Judgment normal.         Diagnostic Data  Procedures    Assessment  Diagnoses and all orders for this visit:    1. Type 2 diabetes mellitus without complication, without long-term current use of insulin (Primary)    2. JACKIE (generalized anxiety disorder)    3. Hypothyroidism, unspecified type    4. Need for influenza vaccination    Other orders  -     Semaglutide,0.25 or 0.5MG/DOS, (Ozempic, 0.25 or 0.5 MG/DOSE,) 2 MG/1.5ML solution pen-injector; Inject 0.5 mg under the skin into the appropriate area as directed 1 (One) Time Per Week.  Dispense: 3 mL; Refill: 1  -     Fluzone >6mos ([id number])        Plan:    1. Type 2 diabetes mellitus without complication, without long-term current use of insulin (Primary)- blood sugars doing better back on metformin and glipizide.  Recent A1c results reviewed with patient in office today.  Increase Ozempic to 0.5 mg weekly.    2. JACKIE (generalized anxiety disorder)- well-controlled on Lexapro.  Keep same.  Continue to monitor.    3. Hypothyroidism, unspecified type- stable on Synthroid.    4. Need for influenza vaccination- administered  flu shot in office today, patient tolerated well.      Return in about 4 weeks (around 11/4/2024) for Recheck.    Gerson Sifuentes PA-C  10/07/2024

## 2024-10-24 ENCOUNTER — OFFICE VISIT (OUTPATIENT)
Age: 39
End: 2024-10-24
Payer: COMMERCIAL

## 2024-10-24 VITALS
DIASTOLIC BLOOD PRESSURE: 90 MMHG | HEIGHT: 63 IN | WEIGHT: 192.5 LBS | SYSTOLIC BLOOD PRESSURE: 130 MMHG | BODY MASS INDEX: 34.11 KG/M2

## 2024-10-24 DIAGNOSIS — M79.642 LEFT HAND PAIN: Primary | ICD-10-CM

## 2024-10-24 NOTE — PROGRESS NOTES
"                                                                 [facility]     Follow-up Office Visit       Date: 10/24/2024   Patient Name: Yasmin Singleton  MRN: [id number]  YOB: 1985    Chief Complaint:   Chief Complaint   Patient presents with    Follow-up     6 week follow up -- Left hand pain       History of Present Illness:   Yasmin Singleton is a 39 y.o. female presents for follow-up of left thumb IP sprain.  She is been doing well since her last visit.  Reports pain is almost entirely resolved.  Reports only mild pain with gripping objects.  No new concerns.      Subjective   Review of Systems:   Review of Systems   Constitutional:  Negative for chills, fever, unexpected weight gain and unexpected weight loss.   HENT:  Negative for congestion, postnasal drip and rhinorrhea.    Eyes:  Negative for blurred vision.   Respiratory:  Negative for shortness of breath.    Cardiovascular:  Negative for leg swelling.   Gastrointestinal:  Negative for abdominal pain, nausea and vomiting.   Genitourinary:  Negative for difficulty urinating.   Musculoskeletal:  Positive for arthralgias. Negative for gait problem, joint swelling and myalgias.   Skin:  Negative for skin lesions and wound.   Neurological:  Negative for dizziness, weakness, light-headedness and numbness.   Hematological:  Does not bruise/bleed easily.   Psychiatric/Behavioral:  Negative for depressed mood.         Pertinent review of systems per HPI    I reviewed the patient's chief complaint, history of present illness, review of systems, past medical history, surgical history, family history, social history, medications and allergy list in the EMR on 10/24/2024 and agree with the findings above.    Objective    Vital Signs:   Vitals:    10/24/24 1323   BP: 130/90   Weight: 87.3 kg (192 lb 8 oz)   Height: 160 cm (62.99\")     BMI: Body mass index is 34.11 kg/m².     General Appearance: No acute distress. Alert and oriented. "     Chest:  Non-labored breathing on room air      Ortho Exam:  Nontender throughout left thumb including over the IP joint.  Full flexion extension without pain.  Fingers warm and well-perfused distally  Sensation intact to light touch in the median, radial and ulnar nerve distributions    Imaging/Studies:   Imaging Results (Last 24 Hours)       ** No results found for the last 24 hours. **              Procedures:  Procedures    Quality Measures:   ACP:   ACP discussion was deferred.    Tobacco:   Yasmin Singleton  reports that she has been smoking cigarettes. She started smoking about 18 years ago. She has a 9.4 pack-year smoking history. She has been exposed to tobacco smoke. She has never used smokeless tobacco.    Assessment / Plan    Assessment/Plan:      Diagnosis Plan   1. Left hand pain            Patient presents for 6-week follow-up of left thumb IP sprain.  Her pain is resolved since her last visit.  She only has very mild pain with use at this time.  Recommend continue home exercise program and follow-up as needed.    Follow Up:   Return if symptoms worsen or fail to improve.        Jesus Gong MD  [facility]

## 2024-11-04 ENCOUNTER — TELEPHONE (OUTPATIENT)
Dept: INTERNAL MEDICINE | Facility: CLINIC | Age: 39
End: 2024-11-04

## 2024-11-04 ENCOUNTER — OFFICE VISIT (OUTPATIENT)
Dept: INTERNAL MEDICINE | Facility: CLINIC | Age: 39
End: 2024-11-04
Payer: COMMERCIAL

## 2024-11-04 VITALS
WEIGHT: 192.6 LBS | BODY MASS INDEX: 34.12 KG/M2 | SYSTOLIC BLOOD PRESSURE: 122 MMHG | DIASTOLIC BLOOD PRESSURE: 68 MMHG | TEMPERATURE: 96.9 F | HEART RATE: 92 BPM | OXYGEN SATURATION: 99 % | HEIGHT: 63 IN

## 2024-11-04 DIAGNOSIS — F41.1 GAD (GENERALIZED ANXIETY DISORDER): ICD-10-CM

## 2024-11-04 DIAGNOSIS — E03.9 HYPOTHYROIDISM, UNSPECIFIED TYPE: ICD-10-CM

## 2024-11-04 DIAGNOSIS — E11.9 TYPE 2 DIABETES MELLITUS WITHOUT COMPLICATION, WITHOUT LONG-TERM CURRENT USE OF INSULIN: Primary | ICD-10-CM

## 2024-11-04 PROCEDURE — 99214 OFFICE O/P EST MOD 30 MIN: CPT | Performed by: PHYSICIAN ASSISTANT

## 2024-11-04 RX ORDER — SEMAGLUTIDE 1.34 MG/ML
1 INJECTION, SOLUTION SUBCUTANEOUS WEEKLY
Qty: 3 ML | Refills: 1 | Status: SHIPPED | OUTPATIENT
Start: 2024-11-04 | End: 2024-11-04

## 2024-11-04 RX ORDER — SEMAGLUTIDE 1.34 MG/ML
1 INJECTION, SOLUTION SUBCUTANEOUS WEEKLY
Qty: 3 ML | Refills: 0 | Status: SHIPPED | OUTPATIENT
Start: 2024-11-04

## 2024-11-04 NOTE — TELEPHONE ENCOUNTER
Caller: [facility]    Relationship:     Best call back number: [phone]     Which medication are you concerned about: Semaglutide,0.25 or 0.5MG/DOS, (Ozempic, 0.25 or 0.5 MG/DOSE,) 2 MG/1.5ML solution pen-injector     Who prescribed you this medication: TANA CASTILLO    When did you start taking this medication: NEW    What are your concerns: PCP SENT OVER THE DIRECTIONS FOR THE 1 MG BUT PT IS ON THE STARTING DOSE 0.25 OR 0.50 MG. PLEASE SEND OVER THE STARTING DIRECTIONS.

## 2024-11-04 NOTE — PROGRESS NOTES
[facility]  [address]  [place], [place] [facility] [address]  Dept: [phone]  Dept Fax: [phone]  Loc: [phone]  Loc Fax: [phone]    Yasmin Singleton  1985    Follow Up Office Visit Note    History of Present Illness:  Patient 39-year-old female in today to follow-up for Ozempic.  Been on 0.5 mg weekly for a month now.  Needing to increase dose.  Overall doing well and feeling well.        The following portions of the patient's history were reviewed and updated as appropriate: allergies, current medications, past family history, past medical history, past social history, past surgical history, and problem list.    Medications:    Current Outpatient Medications:     escitalopram (LEXAPRO) 5 MG tablet, Take 1 tablet by mouth Daily., Disp: 90 tablet, Rfl: 1    glipizide (GLUCOTROL) 5 MG tablet, Take 1 tablet by mouth 2 (Two) Times a Day Before Meals., Disp: 180 tablet, Rfl: 1    glucose blood test strip, 1 each by Other route 2 (Two) Times a Day With Meals. Use as instructed, Disp: 100 each, Rfl: 5    Lancets (freestyle) lancets, 1 each by Other route Daily. Use as instructed, Disp: 100 each, Rfl: 5    levothyroxine (SYNTHROID, LEVOTHROID) 100 MCG tablet, Take 1 tablet by mouth Daily., Disp: 90 tablet, Rfl: 1    loratadine (CLARITIN) 10 MG tablet, Take 1 tablet by mouth Daily., Disp: 30 tablet, Rfl: 5    meloxicam (MOBIC) 7.5 MG tablet, Take 1 tablet by mouth Daily., Disp: 30 tablet, Rfl: 0    metFORMIN ER (GLUCOPHAGE-XR) 750 MG 24 hr tablet, Take 1 tablet by mouth Daily With Breakfast., Disp: 90 tablet, Rfl: 1    pantoprazole (PROTONIX) 20 MG EC tablet, Take 1 tablet by mouth Daily., Disp: 90 tablet, Rfl: 1    Prenatal Vit-Fe Fumarate-FA (Prenatal 27-1) 27-1 MG tablet tablet, Take 1 tablet by mouth Daily., Disp: 90 each, Rfl: 2    Semaglutide,0.25 or 0.5MG/DOS, (Ozempic, 0.25 or 0.5 MG/DOSE,) 2 MG/1.5ML solution pen-injector, Inject 1 mg under the  skin into the appropriate area as directed 1 (One) Time Per Week., Disp: 3 mL, Rfl: 1    spironolactone (ALDACTONE) 50 MG tablet, Take 1 tablet by mouth Daily., Disp: 90 tablet, Rfl: 1    vitamin B-12 (CYANOCOBALAMIN) 1000 MCG tablet, Take 1 tablet by mouth Daily., Disp: 90 tablet, Rfl: 1    vitamin D (ERGOCALCIFEROL) 1.25 MG (79836 UT) capsule capsule, Take 1 capsule by mouth 1 (One) Time Per Week., Disp: 12 capsule, Rfl: 1    Subjective  No Known Allergies     Past Medical History:   Diagnosis Date    Diabetes     GERD (gastroesophageal reflux disease)     History of recurrent UTIs     Kidney stone     Seasonal allergies        Past Surgical History:   Procedure Laterality Date    KIDNEY STONE SURGERY  02/22/2024    WISDOM TOOTH EXTRACTION  2015       Family History   Problem Relation Age of Onset    Kidney failure Mother     Cancer Father         Unsure what kind    Hypertension Father     Stroke Father     Breast cancer Neg Hx     Ovarian cancer Neg Hx         Social History     Socioeconomic History    Marital status: Single   Tobacco Use    Smoking status: Every Day     Current packs/day: 0.50     Average packs/day: 0.5 packs/day for 18.8 years (9.4 ttl pk-yrs)     Types: Cigarettes     Start date: 2006     Passive exposure: Past    Smokeless tobacco: Never   Vaping Use    Vaping status: Never Used   Substance and Sexual Activity    Alcohol use: Yes     Comment: on occasion, at most 1-2 drinks a month    Drug use: Yes     Frequency: 4.0 times per week     Types: Marijuana    Sexual activity: Not Currently     Partners: Male     Birth control/protection: None       Review of Systems   Constitutional:  Negative for activity change, chills, fatigue, fever and unexpected weight change.   HENT:  Negative for congestion, ear pain, postnasal drip, sinus pressure and sore throat.    Eyes:  Negative for pain, discharge and redness.   Respiratory:  Negative for cough, shortness of breath and wheezing.    Cardiovascular:  " Negative for chest pain, palpitations and leg swelling.   Gastrointestinal:  Negative for diarrhea, nausea and vomiting.   Endocrine: Negative for cold intolerance and heat intolerance.   Genitourinary:  Negative for decreased urine volume and dysuria.   Musculoskeletal:  Negative for arthralgias and myalgias.   Skin:  Negative for rash and wound.   Neurological:  Negative for dizziness, light-headedness and headaches.   Hematological:  Does not bruise/bleed easily.   Psychiatric/Behavioral:  Negative for confusion, dysphoric mood and sleep disturbance. The patient is not nervous/anxious.          Objective  Vitals:    11/04/24 0856   BP: 122/68   BP Location: Left arm   Patient Position: Sitting   Cuff Size: Large Adult   Pulse: 92   Temp: 96.9 °F (36.1 °C)   TempSrc: Temporal   SpO2: 99%   Weight: 87.4 kg (192 lb 9.6 oz)   Height: 160 cm (62.99\")     Body mass index is 34.13 kg/m².     Physical Exam  Physical Exam  Vitals and nursing note reviewed.   Constitutional:       General: She is not in acute distress.     Appearance: She is not ill-appearing.   HENT:      Head: Normocephalic.      Right Ear: Tympanic membrane, ear canal and external ear normal. There is no impacted cerumen.      Left Ear: Tympanic membrane, ear canal and external ear normal. There is no impacted cerumen.      Nose: No congestion or rhinorrhea.      Mouth/Throat:      Mouth: Mucous membranes are moist.      Pharynx: Oropharynx is clear. No oropharyngeal exudate or posterior oropharyngeal erythema.   Eyes:      General:         Right eye: No discharge.         Left eye: No discharge.      Extraocular Movements: Extraocular movements intact.      Conjunctiva/sclera: Conjunctivae normal.      Pupils: Pupils are equal, round, and reactive to light.   Cardiovascular:      Rate and Rhythm: Normal rate and regular rhythm.      Heart sounds: Normal heart sounds. No murmur heard.     No friction rub. No gallop.   Pulmonary:      Effort: Pulmonary " effort is normal. No respiratory distress.      Breath sounds: Normal breath sounds. No wheezing.   Abdominal:      General: Bowel sounds are normal. There is no distension.      Palpations: Abdomen is soft. There is no mass.      Tenderness: There is no abdominal tenderness.   Musculoskeletal:         General: No swelling. Normal range of motion.      Cervical back: Normal range of motion. No tenderness.      Right lower leg: No edema.      Left lower leg: No edema.   Lymphadenopathy:      Cervical: No cervical adenopathy.   Skin:     Findings: No bruising, erythema or rash.   Neurological:      Mental Status: She is oriented to person, place, and time.      Gait: Gait normal.   Psychiatric:         Mood and Affect: Mood normal.         Behavior: Behavior normal.         Thought Content: Thought content normal.         Judgment: Judgment normal.         Diagnostic Data  Procedures    Assessment  Diagnoses and all orders for this visit:    1. Type 2 diabetes mellitus without complication, without long-term current use of insulin (Primary)    2. JACKIE (generalized anxiety disorder)    3. Hypothyroidism, unspecified type    Other orders  -     Semaglutide,0.25 or 0.5MG/DOS, (Ozempic, 0.25 or 0.5 MG/DOSE,) 2 MG/1.5ML solution pen-injector; Inject 1 mg under the skin into the appropriate area as directed 1 (One) Time Per Week.  Dispense: 3 mL; Refill: 1        Plan    1. Type 2 diabetes mellitus without complication, without long-term current use of insulin (Primary)- blood sugars doing better back on metformin and glipizide.  Recent A1c results reviewed with patient in office today.  Increase Ozempic to 1 mg weekly.     2. JACKIE (generalized anxiety disorder)- well-controlled on Lexapro.  Keep same.  Continue to monitor.     3. Hypothyroidism, unspecified type- stable on Synthroid.      Return in about 4 weeks (around 12/2/2024) for Recheck.    Gerson Sifuentes PA-C  11/04/2024

## 2024-11-26 RX ORDER — SEMAGLUTIDE 1.34 MG/ML
1 INJECTION, SOLUTION SUBCUTANEOUS WEEKLY
Qty: 3 ML | Refills: 0 | Status: SHIPPED | OUTPATIENT
Start: 2024-11-26

## 2024-12-02 ENCOUNTER — OFFICE VISIT (OUTPATIENT)
Dept: INTERNAL MEDICINE | Facility: CLINIC | Age: 39
End: 2024-12-02
Payer: COMMERCIAL

## 2024-12-02 VITALS
TEMPERATURE: 96.9 F | SYSTOLIC BLOOD PRESSURE: 106 MMHG | BODY MASS INDEX: 33.95 KG/M2 | HEIGHT: 63 IN | DIASTOLIC BLOOD PRESSURE: 76 MMHG | OXYGEN SATURATION: 99 % | WEIGHT: 191.6 LBS | HEART RATE: 88 BPM

## 2024-12-02 DIAGNOSIS — E03.9 HYPOTHYROIDISM, UNSPECIFIED TYPE: ICD-10-CM

## 2024-12-02 DIAGNOSIS — F41.1 GAD (GENERALIZED ANXIETY DISORDER): ICD-10-CM

## 2024-12-02 DIAGNOSIS — Z00.00 HEALTH CARE MAINTENANCE: ICD-10-CM

## 2024-12-02 DIAGNOSIS — E11.9 TYPE 2 DIABETES MELLITUS WITHOUT COMPLICATION, WITHOUT LONG-TERM CURRENT USE OF INSULIN: Primary | ICD-10-CM

## 2024-12-02 PROCEDURE — 99214 OFFICE O/P EST MOD 30 MIN: CPT | Performed by: PHYSICIAN ASSISTANT

## 2024-12-13 ENCOUNTER — LAB (OUTPATIENT)
Dept: INTERNAL MEDICINE | Facility: CLINIC | Age: 39
End: 2024-12-13
Payer: COMMERCIAL

## 2024-12-13 DIAGNOSIS — Z00.00 HEALTH CARE MAINTENANCE: ICD-10-CM

## 2024-12-13 LAB
25(OH)D3 SERPL-MCNC: 17.8 NG/ML (ref 30–100)
ALBUMIN SERPL-MCNC: 4.1 G/DL (ref 3.5–5.2)
ALBUMIN/GLOB SERPL: 1.2 G/DL
ALP SERPL-CCNC: 62 U/L (ref 39–117)
ALT SERPL W P-5'-P-CCNC: 17 U/L (ref 1–33)
ANION GAP SERPL CALCULATED.3IONS-SCNC: 10.4 MMOL/L (ref 5–15)
AST SERPL-CCNC: 19 U/L (ref 1–32)
BILIRUB SERPL-MCNC: 0.3 MG/DL (ref 0–1.2)
BUN SERPL-MCNC: 8 MG/DL (ref 6–20)
BUN/CREAT SERPL: 10.3 (ref 7–25)
CALCIUM SPEC-SCNC: 9.4 MG/DL (ref 8.6–10.5)
CHLORIDE SERPL-SCNC: 104 MMOL/L (ref 98–107)
CHOLEST SERPL-MCNC: 149 MG/DL (ref 0–200)
CO2 SERPL-SCNC: 22.6 MMOL/L (ref 22–29)
CREAT SERPL-MCNC: 0.78 MG/DL (ref 0.57–1)
DEPRECATED RDW RBC AUTO: 45.2 FL (ref 37–54)
EGFRCR SERPLBLD CKD-EPI 2021: 99.8 ML/MIN/1.73
ERYTHROCYTE [DISTWIDTH] IN BLOOD BY AUTOMATED COUNT: 14.3 % (ref 12.3–15.4)
FOLATE SERPL-MCNC: 8.22 NG/ML (ref 4.78–24.2)
GLOBULIN UR ELPH-MCNC: 3.3 GM/DL
GLUCOSE SERPL-MCNC: 125 MG/DL (ref 65–99)
HCT VFR BLD AUTO: 40.9 % (ref 34–46.6)
HDLC SERPL-MCNC: 43 MG/DL (ref 40–60)
HGB BLD-MCNC: 14 G/DL (ref 12–15.9)
LDLC SERPL CALC-MCNC: 96 MG/DL (ref 0–100)
LDLC/HDLC SERPL: 2.25 {RATIO}
MCH RBC QN AUTO: 29.7 PG (ref 26.6–33)
MCHC RBC AUTO-ENTMCNC: 34.2 G/DL (ref 31.5–35.7)
MCV RBC AUTO: 86.8 FL (ref 79–97)
PLATELET # BLD AUTO: 265 10*3/MM3 (ref 140–450)
PMV BLD AUTO: 9.4 FL (ref 6–12)
POTASSIUM SERPL-SCNC: 3.6 MMOL/L (ref 3.5–5.2)
PROT SERPL-MCNC: 7.4 G/DL (ref 6–8.5)
RBC # BLD AUTO: 4.71 10*6/MM3 (ref 3.77–5.28)
SODIUM SERPL-SCNC: 137 MMOL/L (ref 136–145)
TRIGL SERPL-MCNC: 46 MG/DL (ref 0–150)
VIT B12 BLD-MCNC: 310 PG/ML (ref 211–946)
VLDLC SERPL-MCNC: 10 MG/DL (ref 5–40)
WBC NRBC COR # BLD AUTO: 5.98 10*3/MM3 (ref 3.4–10.8)

## 2024-12-13 PROCEDURE — 82746 ASSAY OF FOLIC ACID SERUM: CPT | Performed by: PHYSICIAN ASSISTANT

## 2024-12-13 PROCEDURE — 36415 COLL VENOUS BLD VENIPUNCTURE: CPT | Performed by: PHYSICIAN ASSISTANT

## 2024-12-13 PROCEDURE — 83036 HEMOGLOBIN GLYCOSYLATED A1C: CPT | Performed by: PHYSICIAN ASSISTANT

## 2024-12-13 PROCEDURE — 82306 VITAMIN D 25 HYDROXY: CPT | Performed by: PHYSICIAN ASSISTANT

## 2024-12-13 PROCEDURE — 80061 LIPID PANEL: CPT | Performed by: PHYSICIAN ASSISTANT

## 2024-12-13 PROCEDURE — 82607 VITAMIN B-12: CPT | Performed by: PHYSICIAN ASSISTANT

## 2024-12-13 PROCEDURE — 80050 GENERAL HEALTH PANEL: CPT | Performed by: PHYSICIAN ASSISTANT

## 2024-12-14 LAB
HBA1C MFR BLD: 6.1 % (ref 4.8–5.6)
TSH SERPL DL<=0.05 MIU/L-ACNC: 1.59 UIU/ML (ref 0.27–4.2)

## 2024-12-26 RX ORDER — SEMAGLUTIDE 1.34 MG/ML
1 INJECTION, SOLUTION SUBCUTANEOUS WEEKLY
Qty: 3 ML | Refills: 0 | Status: SHIPPED | OUTPATIENT
Start: 2024-12-26 | End: 2024-12-30

## 2024-12-30 ENCOUNTER — OFFICE VISIT (OUTPATIENT)
Dept: INTERNAL MEDICINE | Facility: CLINIC | Age: 39
End: 2024-12-30
Payer: COMMERCIAL

## 2024-12-30 VITALS
SYSTOLIC BLOOD PRESSURE: 124 MMHG | HEART RATE: 96 BPM | BODY MASS INDEX: 32.67 KG/M2 | OXYGEN SATURATION: 98 % | DIASTOLIC BLOOD PRESSURE: 84 MMHG | HEIGHT: 63 IN | TEMPERATURE: 96.9 F | WEIGHT: 184.4 LBS

## 2024-12-30 DIAGNOSIS — E11.9 TYPE 2 DIABETES MELLITUS WITHOUT COMPLICATION, WITHOUT LONG-TERM CURRENT USE OF INSULIN: Primary | ICD-10-CM

## 2024-12-30 DIAGNOSIS — F41.1 GAD (GENERALIZED ANXIETY DISORDER): ICD-10-CM

## 2024-12-30 DIAGNOSIS — E03.9 HYPOTHYROIDISM, UNSPECIFIED TYPE: ICD-10-CM

## 2024-12-30 DIAGNOSIS — E55.9 VITAMIN D DEFICIENCY: ICD-10-CM

## 2024-12-30 PROCEDURE — 99214 OFFICE O/P EST MOD 30 MIN: CPT | Performed by: PHYSICIAN ASSISTANT

## 2024-12-30 RX ORDER — SEMAGLUTIDE 1.34 MG/ML
1.25 INJECTION, SOLUTION SUBCUTANEOUS WEEKLY
Qty: 4 ML | Refills: 1 | Status: SHIPPED | OUTPATIENT
Start: 2024-12-30

## 2024-12-30 RX ORDER — ERGOCALCIFEROL 1.25 MG/1
50000 CAPSULE, LIQUID FILLED ORAL WEEKLY
Qty: 12 CAPSULE | Refills: 1 | Status: SHIPPED | OUTPATIENT
Start: 2024-12-30

## 2024-12-30 RX ORDER — PRENATAL WITH FERROUS FUM AND FOLIC ACID 3080; 920; 120; 400; 22; 1.84; 3; 20; 10; 1; 12; 200; 27; 25; 2 [IU]/1; [IU]/1; MG/1; [IU]/1; MG/1; MG/1; MG/1; MG/1; MG/1; MG/1; UG/1; MG/1; MG/1; MG/1; MG/1
1 TABLET ORAL DAILY
Qty: 90 EACH | Refills: 2 | Status: SHIPPED | OUTPATIENT
Start: 2024-12-30

## 2024-12-30 NOTE — PROGRESS NOTES
[facility]  [address]  [facility] [place] [facility] [address]  Dept: [phone]  Dept Fax: [phone]  Loc: [phone]  Loc Fax: [phone]    Yasmin Singleton  1985    Follow Up Office Visit Note    History of Present Illness:  Patient 39-year-old female in today to follow-up for Ozempic.  Been on 1 mg weekly for over a month now.  Needing to increase dose.  Overall doing well and feeling well.        The following portions of the patient's history were reviewed and updated as appropriate: allergies, current medications, past family history, past medical history, past social history, past surgical history, and problem list.    Medications:    Current Outpatient Medications:     escitalopram (LEXAPRO) 5 MG tablet, Take 1 tablet by mouth Daily., Disp: 90 tablet, Rfl: 1    glipizide (GLUCOTROL) 5 MG tablet, Take 1 tablet by mouth 2 (Two) Times a Day Before Meals., Disp: 180 tablet, Rfl: 1    glucose blood test strip, 1 each by Other route 2 (Two) Times a Day With Meals. Use as instructed, Disp: 100 each, Rfl: 5    Lancets (freestyle) lancets, 1 each by Other route Daily. Use as instructed, Disp: 100 each, Rfl: 5    levothyroxine (SYNTHROID, LEVOTHROID) 100 MCG tablet, Take 1 tablet by mouth Daily., Disp: 90 tablet, Rfl: 1    loratadine (CLARITIN) 10 MG tablet, Take 1 tablet by mouth Daily., Disp: 30 tablet, Rfl: 5    meloxicam (MOBIC) 7.5 MG tablet, Take 1 tablet by mouth Daily., Disp: 30 tablet, Rfl: 0    metFORMIN ER (GLUCOPHAGE-XR) 750 MG 24 hr tablet, Take 1 tablet by mouth Daily With Breakfast., Disp: 90 tablet, Rfl: 1    Ozempic, 1 MG/DOSE, 4 MG/3ML solution pen-injector, DIAL AND INJECT UNDER THE SKIN 1 MG WEEKLY, Disp: 3 mL, Rfl: 0    pantoprazole (PROTONIX) 20 MG EC tablet, Take 1 tablet by mouth Daily., Disp: 90 tablet, Rfl: 1    Prenatal Vit-Fe Fumarate-FA (Prenatal 27-1) 27-1 MG tablet tablet, Take 1 tablet by mouth Daily., Disp: 90 each, Rfl: 2     spironolactone (ALDACTONE) 50 MG tablet, Take 1 tablet by mouth Daily., Disp: 90 tablet, Rfl: 1    vitamin B-12 (CYANOCOBALAMIN) 1000 MCG tablet, Take 1 tablet by mouth Daily., Disp: 90 tablet, Rfl: 1    vitamin D (ERGOCALCIFEROL) 1.25 MG (72261 UT) capsule capsule, Take 1 capsule by mouth 1 (One) Time Per Week., Disp: 12 capsule, Rfl: 1    Subjective  No Known Allergies     Past Medical History:   Diagnosis Date    Diabetes     GERD (gastroesophageal reflux disease)     History of recurrent UTIs     Kidney stone     Seasonal allergies        Past Surgical History:   Procedure Laterality Date    KIDNEY STONE SURGERY  02/22/2024    WISDOM TOOTH EXTRACTION  2015       Family History   Problem Relation Age of Onset    Kidney failure Mother     Cancer Father         Unsure what kind    Hypertension Father     Stroke Father     Breast cancer Neg Hx     Ovarian cancer Neg Hx         Social History     Socioeconomic History    Marital status: Single   Tobacco Use    Smoking status: Every Day     Current packs/day: 0.50     Average packs/day: 0.5 packs/day for 19.0 years (9.5 ttl pk-yrs)     Types: Cigarettes     Start date: 2006     Passive exposure: Past    Smokeless tobacco: Never   Vaping Use    Vaping status: Never Used   Substance and Sexual Activity    Alcohol use: Yes     Comment: on occasion, at most 1-2 drinks a month    Drug use: Yes     Frequency: 4.0 times per week     Types: Marijuana    Sexual activity: Not Currently     Partners: Male     Birth control/protection: None       Review of Systems   Constitutional:  Negative for activity change, chills, fatigue, fever and unexpected weight change.   HENT:  Negative for congestion, ear pain, postnasal drip, sinus pressure and sore throat.    Eyes:  Negative for pain, discharge and redness.   Respiratory:  Negative for cough, shortness of breath and wheezing.    Cardiovascular:  Negative for chest pain, palpitations and leg swelling.   Gastrointestinal:  Negative  "for diarrhea, nausea and vomiting.   Endocrine: Negative for cold intolerance and heat intolerance.   Genitourinary:  Negative for decreased urine volume and dysuria.   Musculoskeletal:  Negative for arthralgias and myalgias.   Skin:  Negative for rash and wound.   Neurological:  Negative for dizziness, light-headedness and headaches.   Hematological:  Does not bruise/bleed easily.   Psychiatric/Behavioral:  Negative for confusion, dysphoric mood and sleep disturbance. The patient is not nervous/anxious.          Objective  Vitals:    12/30/24 1341   BP: 124/84   BP Location: Left arm   Patient Position: Sitting   Cuff Size: Large Adult   Pulse: 96   Temp: 96.9 °F (36.1 °C)   TempSrc: Temporal   SpO2: 98%   Weight: 83.6 kg (184 lb 6.4 oz)   Height: 160 cm (62.99\")     Body mass index is 32.67 kg/m².     Physical Exam  Physical Exam  Vitals and nursing note reviewed.   Constitutional:       General: She is not in acute distress.     Appearance: She is not ill-appearing.   HENT:      Head: Normocephalic.      Right Ear: Tympanic membrane, ear canal and external ear normal. There is no impacted cerumen.      Left Ear: Tympanic membrane, ear canal and external ear normal. There is no impacted cerumen.      Nose: No congestion or rhinorrhea.      Mouth/Throat:      Mouth: Mucous membranes are moist.      Pharynx: Oropharynx is clear. No oropharyngeal exudate or posterior oropharyngeal erythema.   Eyes:      General:         Right eye: No discharge.         Left eye: No discharge.      Extraocular Movements: Extraocular movements intact.      Conjunctiva/sclera: Conjunctivae normal.      Pupils: Pupils are equal, round, and reactive to light.   Cardiovascular:      Rate and Rhythm: Normal rate and regular rhythm.      Heart sounds: Normal heart sounds. No murmur heard.     No friction rub. No gallop.   Pulmonary:      Effort: Pulmonary effort is normal. No respiratory distress.      Breath sounds: Normal breath sounds. No " wheezing.   Abdominal:      General: Bowel sounds are normal. There is no distension.      Palpations: Abdomen is soft. There is no mass.      Tenderness: There is no abdominal tenderness.   Musculoskeletal:         General: No swelling. Normal range of motion.      Cervical back: Normal range of motion. No tenderness.      Right lower leg: No edema.      Left lower leg: No edema.   Lymphadenopathy:      Cervical: No cervical adenopathy.   Skin:     Findings: No bruising, erythema or rash.   Neurological:      Mental Status: She is oriented to person, place, and time.      Gait: Gait normal.   Psychiatric:         Mood and Affect: Mood normal.         Behavior: Behavior normal.         Thought Content: Thought content normal.         Judgment: Judgment normal.         Diagnostic Data  Procedures    Assessment  Diagnoses and all orders for this visit:    1. Type 2 diabetes mellitus without complication, without long-term current use of insulin (Primary)    2. JACKIE (generalized anxiety disorder)    3. Hypothyroidism, unspecified type        Plan    1. Type 2 diabetes mellitus without complication, without long-term current use of insulin (Primary)- blood sugars doing better back on metformin and glipizide. Increase ozempic to 1.25 mg q weekly.     2. JACKIE (generalized anxiety disorder)- well-controlled on Lexapro.  Keep same.  Continue to monitor.     3. Hypothyroidism, unspecified type- stable on Synthroid.      Return in about 4 weeks (around 1/27/2025) for Recheck.    Gerson Sifuentes PA-C  12/30/2024

## 2025-01-13 ENCOUNTER — TELEPHONE (OUTPATIENT)
Dept: INTERNAL MEDICINE | Facility: CLINIC | Age: 40
End: 2025-01-13

## 2025-01-13 NOTE — TELEPHONE ENCOUNTER
Caller: Yasmin Singleton    Relationship: Self    Best call back number: [phone]     What medication are you requesting: OZEMPIC 2 MG/ DOSE    If a prescription is needed, what is your preferred pharmacy and phone number: [facility] [id number] - [place] [address] [facility] [place]  AT [facility] [address] - [phone]  - [phone] FX     Additional notes: PATIENT WOULD LIKE TO INCREASE DOSAGE OF THIS MEDICATION AND IS COMPLETELY OUT OF THIS MEDICATION.

## 2025-01-21 RX ORDER — SEMAGLUTIDE 1.34 MG/ML
1 INJECTION, SOLUTION SUBCUTANEOUS WEEKLY
Qty: 3 ML | Refills: 0 | OUTPATIENT
Start: 2025-01-21

## 2025-01-27 ENCOUNTER — OFFICE VISIT (OUTPATIENT)
Dept: INTERNAL MEDICINE | Facility: CLINIC | Age: 40
End: 2025-01-27
Payer: COMMERCIAL

## 2025-01-27 VITALS
OXYGEN SATURATION: 99 % | BODY MASS INDEX: 31.71 KG/M2 | HEIGHT: 63 IN | DIASTOLIC BLOOD PRESSURE: 70 MMHG | TEMPERATURE: 97.1 F | HEART RATE: 100 BPM | WEIGHT: 179 LBS | SYSTOLIC BLOOD PRESSURE: 114 MMHG

## 2025-01-27 DIAGNOSIS — E03.9 HYPOTHYROIDISM, UNSPECIFIED TYPE: ICD-10-CM

## 2025-01-27 DIAGNOSIS — S69.91XA INJURY OF RIGHT WRIST, INITIAL ENCOUNTER: ICD-10-CM

## 2025-01-27 DIAGNOSIS — F41.1 GAD (GENERALIZED ANXIETY DISORDER): ICD-10-CM

## 2025-01-27 DIAGNOSIS — E11.9 TYPE 2 DIABETES MELLITUS WITHOUT COMPLICATION, WITHOUT LONG-TERM CURRENT USE OF INSULIN: Primary | ICD-10-CM

## 2025-01-27 PROCEDURE — 99214 OFFICE O/P EST MOD 30 MIN: CPT | Performed by: PHYSICIAN ASSISTANT

## 2025-01-27 NOTE — PROGRESS NOTES
[facility] PRIMARY CARE  [address]  [facility] [address]  Dept: [phone]  Dept Fax: [phone]  Loc: [phone]  Loc Fax: [phone]    Yasmin Singleton  1985    Follow Up Office Visit Note    History of Present Illness:  Patient 39-year-old female in today to follow-up for Ozempic.  Been on 1 mg weekly for over a month now.  Needing to increase dose.  Overall doing well and feeling well.    Also here for R hand pain. Pt slipped and fell and caught herself on her R hand.            The following portions of the patient's history were reviewed and updated as appropriate: allergies, current medications, past family history, past medical history, past social history, past surgical history, and problem list.    Medications:    Current Outpatient Medications:     escitalopram (LEXAPRO) 5 MG tablet, Take 1 tablet by mouth Daily., Disp: 90 tablet, Rfl: 1    glipizide (GLUCOTROL) 5 MG tablet, Take 1 tablet by mouth 2 (Two) Times a Day Before Meals., Disp: 180 tablet, Rfl: 1    glucose blood test strip, 1 each by Other route 2 (Two) Times a Day With Meals. Use as instructed, Disp: 100 each, Rfl: 5    Lancets (freestyle) lancets, 1 each by Other route Daily. Use as instructed, Disp: 100 each, Rfl: 5    levothyroxine (SYNTHROID, LEVOTHROID) 100 MCG tablet, Take 1 tablet by mouth Daily., Disp: 90 tablet, Rfl: 1    loratadine (CLARITIN) 10 MG tablet, Take 1 tablet by mouth Daily., Disp: 30 tablet, Rfl: 5    meloxicam (MOBIC) 7.5 MG tablet, Take 1 tablet by mouth Daily., Disp: 30 tablet, Rfl: 0    metFORMIN ER (GLUCOPHAGE-XR) 750 MG 24 hr tablet, Take 1 tablet by mouth Daily With Breakfast., Disp: 90 tablet, Rfl: 1    pantoprazole (PROTONIX) 20 MG EC tablet, Take 1 tablet by mouth Daily., Disp: 90 tablet, Rfl: 1    Prenatal Vit-Fe Fumarate-FA (Prenatal 27-1) 27-1 MG tablet tablet, Take 1 tablet by mouth Daily., Disp: 90 each, Rfl: 2    Semaglutide, 2 MG/DOSE,  (OZEMPIC) 8 MG/3ML solution pen-injector, Inject 2 mg under the skin into the appropriate area as directed 1 (One) Time Per Week., Disp: 3 mL, Rfl: 1    spironolactone (ALDACTONE) 50 MG tablet, Take 1 tablet by mouth Daily., Disp: 90 tablet, Rfl: 1    vitamin B-12 (CYANOCOBALAMIN) 1000 MCG tablet, Take 1 tablet by mouth Daily., Disp: 90 tablet, Rfl: 1    vitamin D (ERGOCALCIFEROL) 1.25 MG (77333 UT) capsule capsule, Take 1 capsule by mouth 1 (One) Time Per Week., Disp: 12 capsule, Rfl: 1    Subjective  No Known Allergies     Past Medical History:   Diagnosis Date    Diabetes     GERD (gastroesophageal reflux disease)     History of recurrent UTIs     Kidney stone     Seasonal allergies        Past Surgical History:   Procedure Laterality Date    KIDNEY STONE SURGERY  02/22/2024    WISDOM TOOTH EXTRACTION  2015       Family History   Problem Relation Age of Onset    Kidney failure Mother     Cancer Father         Unsure what kind    Hypertension Father     Stroke Father     Breast cancer Neg Hx     Ovarian cancer Neg Hx         Social History     Socioeconomic History    Marital status: Single   Tobacco Use    Smoking status: Every Day     Current packs/day: 0.50     Average packs/day: 0.5 packs/day for 19.1 years (9.5 ttl pk-yrs)     Types: Cigarettes     Start date: 2006     Passive exposure: Past    Smokeless tobacco: Never   Vaping Use    Vaping status: Never Used   Substance and Sexual Activity    Alcohol use: Yes     Comment: on occasion, at most 1-2 drinks a month    Drug use: Yes     Frequency: 4.0 times per week     Types: Marijuana    Sexual activity: Not Currently     Partners: Male     Birth control/protection: None       Review of Systems   Constitutional:  Negative for activity change, chills, fatigue, fever and unexpected weight change.   HENT:  Negative for congestion, ear pain, postnasal drip, sinus pressure and sore throat.    Eyes:  Negative for pain, discharge and redness.   Respiratory:  Negative  "for cough, shortness of breath and wheezing.    Cardiovascular:  Negative for chest pain, palpitations and leg swelling.   Gastrointestinal:  Negative for diarrhea, nausea and vomiting.   Endocrine: Negative for cold intolerance and heat intolerance.   Genitourinary:  Negative for decreased urine volume and dysuria.   Musculoskeletal:  Positive for arthralgias. Negative for myalgias.   Skin:  Negative for rash and wound.   Neurological:  Negative for dizziness, light-headedness and headaches.   Hematological:  Does not bruise/bleed easily.   Psychiatric/Behavioral:  Negative for confusion, dysphoric mood and sleep disturbance. The patient is not nervous/anxious.          Objective  Vitals:    01/27/25 1543   BP: 114/70   BP Location: Left arm   Patient Position: Sitting   Cuff Size: Large Adult   Pulse: 100   Temp: 97.1 °F (36.2 °C)   TempSrc: Temporal   SpO2: 99%   Weight: 81.2 kg (179 lb)   Height: 160 cm (62.99\")     Body mass index is 31.72 kg/m².     Physical Exam  Physical Exam  Vitals and nursing note reviewed.   Constitutional:       General: She is not in acute distress.     Appearance: She is not ill-appearing.   HENT:      Head: Normocephalic.      Right Ear: Tympanic membrane, ear canal and external ear normal. There is no impacted cerumen.      Left Ear: Tympanic membrane, ear canal and external ear normal. There is no impacted cerumen.      Nose: No congestion or rhinorrhea.      Mouth/Throat:      Mouth: Mucous membranes are moist.      Pharynx: Oropharynx is clear. No oropharyngeal exudate or posterior oropharyngeal erythema.   Eyes:      General:         Right eye: No discharge.         Left eye: No discharge.      Extraocular Movements: Extraocular movements intact.      Conjunctiva/sclera: Conjunctivae normal.      Pupils: Pupils are equal, round, and reactive to light.   Cardiovascular:      Rate and Rhythm: Normal rate and regular rhythm.      Heart sounds: Normal heart sounds. No murmur " heard.     No friction rub. No gallop.   Pulmonary:      Effort: Pulmonary effort is normal. No respiratory distress.      Breath sounds: Normal breath sounds. No wheezing.   Abdominal:      General: Bowel sounds are normal. There is no distension.      Palpations: Abdomen is soft. There is no mass.      Tenderness: There is no abdominal tenderness.   Musculoskeletal:         General: Tenderness and signs of injury present. No swelling. Normal range of motion.      Cervical back: Normal range of motion. No tenderness.      Right lower leg: No edema.      Left lower leg: No edema.   Lymphadenopathy:      Cervical: No cervical adenopathy.   Skin:     Findings: No bruising, erythema or rash.   Neurological:      Mental Status: She is oriented to person, place, and time.      Gait: Gait normal.   Psychiatric:         Mood and Affect: Mood normal.         Behavior: Behavior normal.         Thought Content: Thought content normal.         Judgment: Judgment normal.         Diagnostic Data  Procedures    Assessment  Diagnoses and all orders for this visit:    1. Type 2 diabetes mellitus without complication, without long-term current use of insulin (Primary)    2. JACKIE (generalized anxiety disorder)    3. Hypothyroidism, unspecified type    4. Injury of right wrist, initial encounter  -     XR Hand 3+ View Right; Future        Plan    1. Type 2 diabetes mellitus without complication, without long-term current use of insulin (Primary)- blood sugars doing better back on metformin and glipizide. Increase ozempic to 1.25 mg q weekly.     2. JACKIE (generalized anxiety disorder)- well-controlled on Lexapro.  Keep same.  Continue to monitor.     3. Hypothyroidism, unspecified type- stable on Synthroid.    4. R wrist and hand injury- ordered.      Return in about 3 months (around 4/27/2025) for Recheck.    Gerson Sifuentes PA-C  01/27/2025

## 2025-01-29 ENCOUNTER — HOSPITAL ENCOUNTER (OUTPATIENT)
Dept: GENERAL RADIOLOGY | Facility: HOSPITAL | Age: 40
Discharge: HOME | End: 2025-01-29
Admitting: PHYSICIAN ASSISTANT
Payer: COMMERCIAL

## 2025-01-29 DIAGNOSIS — S69.91XA INJURY OF RIGHT WRIST, INITIAL ENCOUNTER: ICD-10-CM

## 2025-01-29 PROCEDURE — 73130 X-RAY EXAM OF HAND: CPT

## 2025-03-03 ENCOUNTER — TELEPHONE (OUTPATIENT)
Dept: INTERNAL MEDICINE | Facility: CLINIC | Age: 40
End: 2025-03-03

## 2025-03-03 ENCOUNTER — TELEMEDICINE (OUTPATIENT)
Dept: INTERNAL MEDICINE | Facility: CLINIC | Age: 40
End: 2025-03-03
Payer: COMMERCIAL

## 2025-03-03 DIAGNOSIS — J10.1 INFLUENZA A: Primary | ICD-10-CM

## 2025-03-03 PROCEDURE — 99213 OFFICE O/P EST LOW 20 MIN: CPT | Performed by: NURSE PRACTITIONER

## 2025-03-03 NOTE — TELEPHONE ENCOUNTER
Caller: Yasmin Singleton    Relationship: Self    Best call back number: [phone]    What is the best time to reach you: ANY TIME    Who are you requesting to speak with (clinical staff, provider,  specific staff member): KARIE CASTILLO    Do you know the name of the person who called: SELF    What was the call regarding: PATIENT WENT TO URGENT CARE ON FRIDAY 02/28/2025 FOR SYMPTOMS OF COUGH AND SORE THROAT TESTED NEG FOR FLU, COVID AND STREP. PATIENT TOOK AT HOME FLU TEST ON SATURDAY AND HAS TESTED POSITIVE FOR THE FLU A. PATIENT WANTED PROVIDER TO BE AWARE.

## 2025-03-03 NOTE — PROGRESS NOTES
Follow Up Office Visit      Patient Name: Yasmin Singleton  : 1985   MRN: [id number]   Care Team: Patient Care Team:  Gerson Sifuentes PA-C as PCP - General (Physician Assistant)  Caroline Wesley MD as Consulting Physician (Cardiology)  Irena Holcomb APRN as Nurse Practitioner (Nurse Practitioner)  Jesus Gong MD as Consulting Physician (Orthopedic Surgery)    Chief Complaint:    Chief Complaint   Patient presents with    Influenza            You have chosen to receive care through a telehealth visit.  Do you consent to use a video/audio connection for your medical care today? Yes    History of Present Illness: Yasmin Singleton is a 39 y.o. female with pertinent medical history significant for generalized anxiety disorder, hypertension, type 2 diabetes, hypothyroidism, GERD, mild left ventricular hypertrophy, vitamin D deficiency, generalized anxiety disorder and insomnia.  She presents via A/V visit today and is visualized lying in her bed at home.  Reports that she was seen at urgent care on Friday afternoon for sudden onset of general illness including cough, sore throat, body aches and fatigue.  At that time, she tested negative for flu and COVID but was told that being very early in her symptoms, she should likely retest the next day.  She obtained a flu test from over-the-counter and tested positive for flu A Saturday evening.  She has remained resting at home and trying to hydrate well with fluids.  She has had ongoing symptoms of dry cough, scratchy throat, fatigue and hot/cold spells throughout the weekend.  She admits that today, her symptoms seem somewhat improved and her scratchy throat has resolved.  Cough also seemed to improve overnight last p.m. as she was able to get some rest, which she had not done so in the last few nights prior.    She denies any chest pain, shortness of breath, nausea, vomiting, diarrhea, syncope.     She has been using OTC therapies of  TheraFlu, Tylenol and Mucinex.    She missed work on Friday but plans to return to work on Wednesday.    Subjective          I have reviewed and the following portions of the patient's history were updated as appropriate: past family history, past medical history, past social history, past surgical history and problem list.    Medications:     Current Outpatient Medications:     escitalopram (LEXAPRO) 5 MG tablet, Take 1 tablet by mouth Daily., Disp: 90 tablet, Rfl: 1    glipizide (GLUCOTROL) 5 MG tablet, Take 1 tablet by mouth 2 (Two) Times a Day Before Meals., Disp: 180 tablet, Rfl: 1    glucose blood test strip, 1 each by Other route 2 (Two) Times a Day With Meals. Use as instructed, Disp: 100 each, Rfl: 5    Lancets (freestyle) lancets, 1 each by Other route Daily. Use as instructed, Disp: 100 each, Rfl: 5    levothyroxine (SYNTHROID, LEVOTHROID) 100 MCG tablet, Take 1 tablet by mouth Daily., Disp: 90 tablet, Rfl: 1    loratadine (CLARITIN) 10 MG tablet, Take 1 tablet by mouth Daily., Disp: 30 tablet, Rfl: 5    meloxicam (MOBIC) 7.5 MG tablet, Take 1 tablet by mouth Daily., Disp: 30 tablet, Rfl: 0    metFORMIN ER (GLUCOPHAGE-XR) 750 MG 24 hr tablet, Take 1 tablet by mouth Daily With Breakfast., Disp: 90 tablet, Rfl: 1    pantoprazole (PROTONIX) 20 MG EC tablet, Take 1 tablet by mouth Daily., Disp: 90 tablet, Rfl: 1    Prenatal Vit-Fe Fumarate-FA (Prenatal 27-1) 27-1 MG tablet tablet, Take 1 tablet by mouth Daily., Disp: 90 each, Rfl: 2    Semaglutide, 2 MG/DOSE, (OZEMPIC) 8 MG/3ML solution pen-injector, Inject 2 mg under the skin into the appropriate area as directed 1 (One) Time Per Week., Disp: 3 mL, Rfl: 1    spironolactone (ALDACTONE) 50 MG tablet, Take 1 tablet by mouth Daily., Disp: 90 tablet, Rfl: 1    vitamin B-12 (CYANOCOBALAMIN) 1000 MCG tablet, Take 1 tablet by mouth Daily., Disp: 90 tablet, Rfl: 1    vitamin D (ERGOCALCIFEROL) 1.25 MG (89235 UT) capsule capsule, Take 1 capsule by mouth 1 (One) Time  Per Week., Disp: 12 capsule, Rfl: 1    Allergies:   No Known Allergies    Objective     Physical Exam:  Vital Signs: There were no vitals filed for this visit.  There is no height or weight on file to calculate BMI.     Physical Exam  Constitutional:       General: She is not in acute distress.  HENT:      Head: Normocephalic and atraumatic.   Eyes:      General: No scleral icterus.  Pulmonary:      Effort: Pulmonary effort is normal.   Neurological:      Mental Status: She is alert and oriented to person, place, and time.   Psychiatric:         Mood and Affect: Mood normal.         Assessment / Plan      Assessment/Plan:   Problems Addressed This Visit    ICD-10-CM ICD-9-CM   1. Influenza A  J10.1 487.1      -Encouraged rest and fluids  -May continue use of OTC therapies as needed  -Patient is outside window for use of antiviral  -Encourage patient to follow-up if symptoms worsen  -May return to work on Wednesday      Plan of care reviewed with patient at the conclusion of today's visit. Education was provided regarding diagnosis and management.  Patient verbalizes understanding of and agreement with management plan.      Follow Up:   Return if symptoms worsen or fail to improve, for Next scheduled follow up.        JAIMIE Vargas  [facility] [address]    Please note that portions of this note were completed with a voice recognition program.

## 2025-03-11 RX ORDER — SPIRONOLACTONE 50 MG/1
50 TABLET, FILM COATED ORAL DAILY
Qty: 90 TABLET | Refills: 1 | Status: SHIPPED | OUTPATIENT
Start: 2025-03-11

## 2025-03-11 RX ORDER — SEMAGLUTIDE 2.68 MG/ML
INJECTION, SOLUTION SUBCUTANEOUS
Qty: 3 ML | Refills: 1 | Status: SHIPPED | OUTPATIENT
Start: 2025-03-11

## 2025-04-21 ENCOUNTER — TELEPHONE (OUTPATIENT)
Dept: INTERNAL MEDICINE | Age: 40
End: 2025-04-21

## 2025-04-21 DIAGNOSIS — G89.29 CHRONIC PAIN OF RIGHT HAND: Primary | ICD-10-CM

## 2025-04-21 DIAGNOSIS — M79.641 CHRONIC PAIN OF RIGHT HAND: Primary | ICD-10-CM

## 2025-04-21 NOTE — TELEPHONE ENCOUNTER
Caller: Yasmin Singleton    Relationship: Self    Best call back number:       [phone] (Mobile)     What is the medical concern/diagnosis:     REFERRAL FOR RIGHT HAND SPECIALIST    What is the provider, practice or medical service name:     AS RECOMMENDED BY KARIE CASTILLO    What is the office location:     [facility]    Any additional details:     PATIENT STATED HE RIGHT HAND HAS STARTED TO ACHE

## 2025-04-28 ENCOUNTER — OFFICE VISIT (OUTPATIENT)
Dept: INTERNAL MEDICINE | Age: 40
End: 2025-04-28
Payer: COMMERCIAL

## 2025-04-28 VITALS
HEART RATE: 84 BPM | OXYGEN SATURATION: 99 % | BODY MASS INDEX: 30.58 KG/M2 | DIASTOLIC BLOOD PRESSURE: 80 MMHG | SYSTOLIC BLOOD PRESSURE: 116 MMHG | WEIGHT: 172.6 LBS | HEIGHT: 63 IN | TEMPERATURE: 97.3 F

## 2025-04-28 DIAGNOSIS — Z00.00 ANNUAL PHYSICAL EXAM: Primary | ICD-10-CM

## 2025-04-28 DIAGNOSIS — R22.32 MASS OF LEFT AXILLA: ICD-10-CM

## 2025-04-28 DIAGNOSIS — R06.83 SNORING: ICD-10-CM

## 2025-04-28 DIAGNOSIS — E11.9 ENCOUNTER FOR DIABETIC FOOT EXAM: ICD-10-CM

## 2025-04-28 DIAGNOSIS — E11.9 TYPE 2 DIABETES MELLITUS WITHOUT COMPLICATION, WITHOUT LONG-TERM CURRENT USE OF INSULIN: ICD-10-CM

## 2025-04-28 LAB
ALBUMIN UR-MCNC: <1.2 MG/DL
CREAT UR-MCNC: 164.1 MG/DL
MICROALBUMIN/CREAT UR: NORMAL MG/G{CREAT}

## 2025-04-28 PROCEDURE — 82570 ASSAY OF URINE CREATININE: CPT | Performed by: PHYSICIAN ASSISTANT

## 2025-04-28 PROCEDURE — 80061 LIPID PANEL: CPT | Performed by: PHYSICIAN ASSISTANT

## 2025-04-28 PROCEDURE — 80050 GENERAL HEALTH PANEL: CPT | Performed by: PHYSICIAN ASSISTANT

## 2025-04-28 PROCEDURE — 82746 ASSAY OF FOLIC ACID SERUM: CPT | Performed by: PHYSICIAN ASSISTANT

## 2025-04-28 PROCEDURE — 82607 VITAMIN B-12: CPT | Performed by: PHYSICIAN ASSISTANT

## 2025-04-28 PROCEDURE — 82306 VITAMIN D 25 HYDROXY: CPT | Performed by: PHYSICIAN ASSISTANT

## 2025-04-28 PROCEDURE — 83036 HEMOGLOBIN GLYCOSYLATED A1C: CPT | Performed by: PHYSICIAN ASSISTANT

## 2025-04-28 PROCEDURE — 82043 UR ALBUMIN QUANTITATIVE: CPT | Performed by: PHYSICIAN ASSISTANT

## 2025-04-28 NOTE — PROGRESS NOTES
[facility] PRIMARY [facility]ARE  [address]  [facility] [address]  Dept: [phone]  Dept Fax: [phone]  Loc: [phone]  Loc Fax: [phone]    Yasmin Singleton  1985    Follow Up Office Visit Note    History of Present Illness:  Pt is a 38 y/o female in today for annual physical. Has noticed 2 small lumps in her L axilla. Denies any pain.        The following portions of the patient's history were reviewed and updated as appropriate: allergies, current medications, past family history, past medical history, past social history, past surgical history, and problem list.    Medications:    Current Outpatient Medications:     escitalopram (LEXAPRO) 5 MG tablet, Take 1 tablet by mouth Daily., Disp: 90 tablet, Rfl: 1    glipizide (GLUCOTROL) 5 MG tablet, Take 1 tablet by mouth 2 (Two) Times a Day Before Meals., Disp: 180 tablet, Rfl: 1    glucose blood test strip, 1 each by Other route 2 (Two) Times a Day With Meals. Use as instructed, Disp: 100 each, Rfl: 5    Lancets (freestyle) lancets, 1 each by Other route Daily. Use as instructed, Disp: 100 each, Rfl: 5    levothyroxine (SYNTHROID, LEVOTHROID) 100 MCG tablet, Take 1 tablet by mouth Daily., Disp: 90 tablet, Rfl: 1    loratadine (CLARITIN) 10 MG tablet, Take 1 tablet by mouth Daily., Disp: 30 tablet, Rfl: 5    meloxicam (MOBIC) 7.5 MG tablet, Take 1 tablet by mouth Daily., Disp: 30 tablet, Rfl: 0    metFORMIN ER (GLUCOPHAGE-XR) 750 MG 24 hr tablet, Take 1 tablet by mouth Daily With Breakfast., Disp: 90 tablet, Rfl: 1    Ozempic, 2 MG/DOSE, 8 MG/3ML solution pen-injector, DIAL AND INJECT UNDER THE SKIN 2 MG WEEKLY, Disp: 3 mL, Rfl: 1    pantoprazole (PROTONIX) 20 MG EC tablet, Take 1 tablet by mouth Daily., Disp: 90 tablet, Rfl: 1    Prenatal Vit-Fe Fumarate-FA (Prenatal 27-1) 27-1 MG tablet tablet, Take 1 tablet by mouth Daily., Disp: 90 each, Rfl: 2    spironolactone (ALDACTONE) 50 MG tablet, TAKE 1 TABLET  BY MOUTH DAILY, Disp: 90 tablet, Rfl: 1    vitamin B-12 (CYANOCOBALAMIN) 1000 MCG tablet, Take 1 tablet by mouth Daily., Disp: 90 tablet, Rfl: 1    vitamin D (ERGOCALCIFEROL) 1.25 MG (28362 UT) capsule capsule, Take 1 capsule by mouth 1 (One) Time Per Week., Disp: 12 capsule, Rfl: 1    Subjective  No Known Allergies     Past Medical History:   Diagnosis Date    Diabetes     GERD (gastroesophageal reflux disease)     History of recurrent UTIs     Kidney stone     Seasonal allergies        Past Surgical History:   Procedure Laterality Date    KIDNEY STONE SURGERY  02/22/2024    WISDOM TOOTH EXTRACTION  2015       Family History   Problem Relation Age of Onset    Kidney failure Mother     Cancer Father         Unsure what kind    Hypertension Father     Stroke Father     Breast cancer Neg Hx     Ovarian cancer Neg Hx         Social History     Socioeconomic History    Marital status: Single   Tobacco Use    Smoking status: Every Day     Current packs/day: 0.50     Average packs/day: 0.5 packs/day for 19.3 years (9.7 ttl pk-yrs)     Types: Cigarettes     Start date: 2006     Passive exposure: Past    Smokeless tobacco: Never   Vaping Use    Vaping status: Never Used   Substance and Sexual Activity    Alcohol use: Yes     Comment: on occasion, at most 1-2 drinks a month    Drug use: Yes     Frequency: 4.0 times per week     Types: Marijuana    Sexual activity: Not Currently     Partners: Male     Birth control/protection: None       Review of Systems   Constitutional:  Negative for activity change, chills, fatigue, fever and unexpected weight change.   HENT:  Negative for congestion, ear pain, postnasal drip, sinus pressure and sore throat.    Eyes:  Negative for pain, discharge and redness.   Respiratory:  Negative for cough, shortness of breath and wheezing.    Cardiovascular:  Negative for chest pain, palpitations and leg swelling.   Gastrointestinal:  Negative for diarrhea, nausea and vomiting.   Endocrine: Negative  "for cold intolerance and heat intolerance.   Genitourinary:  Negative for decreased urine volume and dysuria.   Musculoskeletal:  Negative for arthralgias and myalgias.   Skin:  Negative for rash and wound.   Neurological:  Negative for dizziness, light-headedness and headaches.   Hematological:  Does not bruise/bleed easily.   Psychiatric/Behavioral:  Negative for confusion, dysphoric mood and sleep disturbance. The patient is not nervous/anxious.          Objective  Vitals:    04/28/25 1007   BP: 116/80   BP Location: Left arm   Patient Position: Sitting   Cuff Size: Large Adult   Pulse: 84   Temp: 97.3 °F (36.3 °C)   TempSrc: Temporal   SpO2: 99%   Weight: 78.3 kg (172 lb 9.6 oz)   Height: 160 cm (62.99\")     Body mass index is 30.58 kg/m².     Physical Exam  Physical Exam  Vitals and nursing note reviewed.   Constitutional:       General: She is not in acute distress.     Appearance: She is not ill-appearing.   HENT:      Head: Normocephalic.      Right Ear: Tympanic membrane, ear canal and external ear normal. There is no impacted cerumen.      Left Ear: Tympanic membrane, ear canal and external ear normal. There is no impacted cerumen.      Nose: No congestion or rhinorrhea.      Mouth/Throat:      Mouth: Mucous membranes are moist.      Pharynx: Oropharynx is clear. No oropharyngeal exudate or posterior oropharyngeal erythema.      Comments: Mallampati score of III on exam.  Eyes:      General:         Right eye: No discharge.         Left eye: No discharge.      Extraocular Movements: Extraocular movements intact.      Conjunctiva/sclera: Conjunctivae normal.      Pupils: Pupils are equal, round, and reactive to light.   Cardiovascular:      Rate and Rhythm: Normal rate and regular rhythm.      Heart sounds: Normal heart sounds. No murmur heard.     No friction rub. No gallop.   Pulmonary:      Effort: Pulmonary effort is normal. No respiratory distress.      Breath sounds: Normal breath sounds. No wheezing. "   Abdominal:      General: Bowel sounds are normal. There is no distension.      Palpations: Abdomen is soft. There is no mass.      Tenderness: There is no abdominal tenderness.   Musculoskeletal:         General: No swelling. Normal range of motion.      Cervical back: Normal range of motion. No tenderness.      Right lower leg: No edema.      Left lower leg: No edema.   Lymphadenopathy:      Cervical: No cervical adenopathy.   Skin:     Findings: No bruising, erythema or rash.      Comments: 2 lesions approx 0.5 cm in circ each superficial, round, well-circumscribed and non-mobile of L axilla on exam.   Neurological:      Mental Status: She is oriented to person, place, and time.      Gait: Gait normal.   Psychiatric:         Mood and Affect: Mood normal.         Behavior: Behavior normal.         Thought Content: Thought content normal.         Judgment: Judgment normal.         Diagnostic Data  Procedures    Assessment  Diagnoses and all orders for this visit:    1. Annual physical exam (Primary)  -     Vitamin B12 & Folate  -     Vitamin D,25-Hydroxy; Future  -     Lipid Panel  -     Hemoglobin A1c; Future  -     TSH Rfx On Abnormal To Free T4  -     Comprehensive Metabolic Panel  -     CBC (No Diff)    2. Encounter for diabetic foot exam  -     Ambulatory Referral to Podiatry    3. Type 2 diabetes mellitus without complication, without long-term current use of insulin  -     Microalbumin / Creatinine Urine Ratio - Urine, Clean Catch; Future    4. Mass of left axilla  -     US Axilla Left; Future    5. Snoring  -     Ambulatory Referral to Sleep Medicine        Plan    1. Annual physical exam (Primary)- ordered fasting labs and follow up with these. Advised on nutrition and exercise and follow up with this.    2. Encounter for diabetic foot exam- Ambulatory Referral to Podiatry    3. Type 2 diabetes mellitus without complication, without long-term current use of insulin- ordered urine micro.    4. Mass of left  axilla- ordered ultrasound.    5. Snoring- Ambulatory Referral to Sleep Medicine      Return in about 6 weeks (around 6/9/2025) for Recheck.    Gerson Sifuentes PA-C  04/28/2025

## 2025-04-29 LAB
25(OH)D3 SERPL-MCNC: 19.4 NG/ML (ref 30–100)
ALBUMIN SERPL-MCNC: 4.2 G/DL (ref 3.5–5.2)
ALBUMIN/GLOB SERPL: 1.4 G/DL
ALP SERPL-CCNC: 61 U/L (ref 39–117)
ALT SERPL W P-5'-P-CCNC: 32 U/L (ref 1–33)
ANION GAP SERPL CALCULATED.3IONS-SCNC: 11.6 MMOL/L (ref 5–15)
AST SERPL-CCNC: 21 U/L (ref 1–32)
BILIRUB SERPL-MCNC: <0.2 MG/DL (ref 0–1.2)
BUN SERPL-MCNC: 10 MG/DL (ref 6–20)
BUN/CREAT SERPL: 13.9 (ref 7–25)
CALCIUM SPEC-SCNC: 9.1 MG/DL (ref 8.6–10.5)
CHLORIDE SERPL-SCNC: 104 MMOL/L (ref 98–107)
CHOLEST SERPL-MCNC: 159 MG/DL (ref 0–200)
CO2 SERPL-SCNC: 23.4 MMOL/L (ref 22–29)
CREAT SERPL-MCNC: 0.72 MG/DL (ref 0.57–1)
DEPRECATED RDW RBC AUTO: 48.1 FL (ref 37–54)
EGFRCR SERPLBLD CKD-EPI 2021: 109.9 ML/MIN/1.73
ERYTHROCYTE [DISTWIDTH] IN BLOOD BY AUTOMATED COUNT: 14.6 % (ref 12.3–15.4)
FOLATE SERPL-MCNC: 5.77 NG/ML (ref 4.78–24.2)
GLOBULIN UR ELPH-MCNC: 3 GM/DL
GLUCOSE SERPL-MCNC: 124 MG/DL (ref 65–99)
HBA1C MFR BLD: 6 % (ref 4.8–5.6)
HCT VFR BLD AUTO: 41.5 % (ref 34–46.6)
HDLC SERPL-MCNC: 47 MG/DL (ref 40–60)
HGB BLD-MCNC: 13.7 G/DL (ref 12–15.9)
LDLC SERPL CALC-MCNC: 99 MG/DL (ref 0–100)
LDLC/HDLC SERPL: 2.09 {RATIO}
MCH RBC QN AUTO: 29.6 PG (ref 26.6–33)
MCHC RBC AUTO-ENTMCNC: 33 G/DL (ref 31.5–35.7)
MCV RBC AUTO: 89.6 FL (ref 79–97)
PLATELET # BLD AUTO: 255 10*3/MM3 (ref 140–450)
PMV BLD AUTO: 9.8 FL (ref 6–12)
POTASSIUM SERPL-SCNC: 3.8 MMOL/L (ref 3.5–5.2)
PROT SERPL-MCNC: 7.2 G/DL (ref 6–8.5)
RBC # BLD AUTO: 4.63 10*6/MM3 (ref 3.77–5.28)
SODIUM SERPL-SCNC: 139 MMOL/L (ref 136–145)
TRIGL SERPL-MCNC: 69 MG/DL (ref 0–150)
TSH SERPL DL<=0.05 MIU/L-ACNC: 3.27 UIU/ML (ref 0.27–4.2)
VIT B12 BLD-MCNC: 338 PG/ML (ref 211–946)
VLDLC SERPL-MCNC: 13 MG/DL (ref 5–40)
WBC NRBC COR # BLD AUTO: 6.16 10*3/MM3 (ref 3.4–10.8)

## 2025-04-30 ENCOUNTER — HOSPITAL ENCOUNTER (OUTPATIENT)
Dept: ULTRASOUND IMAGING | Facility: HOSPITAL | Age: 40
Discharge: HOME | End: 2025-04-30
Admitting: PHYSICIAN ASSISTANT
Payer: COMMERCIAL

## 2025-04-30 DIAGNOSIS — R22.32 MASS OF LEFT AXILLA: ICD-10-CM

## 2025-04-30 PROCEDURE — 76882 US LMTD JT/FCL EVL NVASC XTR: CPT

## 2025-05-07 RX ORDER — SEMAGLUTIDE 2.68 MG/ML
INJECTION, SOLUTION SUBCUTANEOUS
Qty: 3 ML | Refills: 1 | Status: SHIPPED | OUTPATIENT
Start: 2025-05-07

## 2025-05-08 ENCOUNTER — TELEPHONE (OUTPATIENT)
Dept: INTERNAL MEDICINE | Age: 40
End: 2025-05-08
Payer: COMMERCIAL

## 2025-05-08 NOTE — TELEPHONE ENCOUNTER
Provider: ESTER CASTILLO    Caller: Yasmin Singleton    Relationship to Patient: Self    Phone Number: [phone]     Reason for Call: PATIENT IS UNABLE TO ACTIVATE THE ACCOUNT FOR THE PODIATRY REFERRAL, AND NEEDS TO RESCHEDULE HER APPOINTMENT BUT DOES NOT HAVE A NUMBER FOR THE PODIATRY OFFICE, PLEASE CALL TO DISCUSS OR MESSAGE THROUGH [facility]

## 2025-05-08 NOTE — TELEPHONE ENCOUNTER
Sent patient a Trutap message with the name of place she will be going to and the phone number to call and r/s

## 2025-05-29 ENCOUNTER — OFFICE VISIT (OUTPATIENT)
Dept: OBSTETRICS AND GYNECOLOGY | Facility: CLINIC | Age: 40
End: 2025-05-29
Payer: COMMERCIAL

## 2025-05-29 VITALS
DIASTOLIC BLOOD PRESSURE: 72 MMHG | SYSTOLIC BLOOD PRESSURE: 120 MMHG | HEIGHT: 63 IN | BODY MASS INDEX: 30.48 KG/M2 | WEIGHT: 172 LBS

## 2025-05-29 DIAGNOSIS — E28.2 PCOS (POLYCYSTIC OVARIAN SYNDROME): ICD-10-CM

## 2025-05-29 DIAGNOSIS — N89.8 VAGINAL ODOR: ICD-10-CM

## 2025-05-29 DIAGNOSIS — Z11.3 SCREENING EXAMINATION FOR STD (SEXUALLY TRANSMITTED DISEASE): ICD-10-CM

## 2025-05-29 DIAGNOSIS — Z01.419 WOMEN'S ANNUAL ROUTINE GYNECOLOGICAL EXAMINATION: Primary | ICD-10-CM

## 2025-05-29 RX ORDER — METRONIDAZOLE 500 MG/1
500 TABLET ORAL 2 TIMES DAILY
Qty: 14 TABLET | Refills: 0 | Status: SHIPPED | OUTPATIENT
Start: 2025-05-29 | End: 2025-06-05

## 2025-05-29 NOTE — PROGRESS NOTES
"Subjective   Chief Complaint   Patient presents with    Gynecologic Exam     Annual, pt c/o vaginal odor that smells like ammonia and a clear discharge X \"years.\"     Yasmin Singleton is a 39 y.o. year old  presenting to be seen for her annual exam. She is doing well, but reports vaginal odor and discharge. She states it is more so a problem with consistent BV. She has tried to take a probiotic and RePhresh, but they did not work. She has some discharge intermittently that's clear. She has a history of PCOS. She states she skipped her cycle in April, but otherwise has monthly periods.     Last pap 2024: NILM, HPV negative   HPV vaccine: patient unsure     SEXUAL Hx:  She is not currently sexually active.  In the past year there there has been NO new sexual partners.    Condoms are always used.  She would like to be screened for STD's at today's exam.  Current birth control method: condoms.  She is happy with her current method of contraception and does not want to discuss alternative methods of contraception.  MENSTRUAL Hx:  Patient's last menstrual period was 2025 (exact date).  In the past 6 months her cycles have been regular, predictable and occur monthly, however she skipped last month. Her menstrual flow is typically normal.   Each month on average there are roughly 3 day(s) of very heavy flow.  Intermenstrual bleeding is absent.    Post-coital bleeding is absent.  Dysmenorrhea: mild, and uses a heating pad only, does not need medications  PMS: none  Her cycles are not a source of concern for her that she wishes to discuss today.  HEALTH Hx:  She exercises regularly: yes. Walking constantly at work (she is a  for [email or web address]!)  She wears her seat belt: yes.  She has concerns about domestic violence: no.    The following portions of the patient's history were reviewed and updated as appropriate:problem list, current medications, allergies, past family history, past medical history, past " "social history, and past surgical history.    Social History    Tobacco Use      Smoking status: Every Day        Packs/day: 0.50        Years: 0.5 packs/day for 19.4 years (9.7 ttl pk-yrs)        Types: Cigarettes        Start date: 2006        Passive exposure: Past      Smokeless tobacco: Never         Objective   /72 (BP Location: Left arm, Patient Position: Sitting, Cuff Size: Adult)   Ht 160 cm (62.99\")   Wt 78 kg (172 lb)   LMP 05/05/2025 (Exact Date)   Breastfeeding No   BMI 30.48 kg/m²     General:  well developed; well nourished  no acute distress  mentation appropriate  obese - Body mass index is 30.48 kg/m².   Breasts:  Examined in supine position  Symmetric without masses or skin dimpling  Nipples normal without inversion, lesions or discharge  There are no palpable axillary nodes  Fibrocystic changes are present both breasts without a discrete mass   Pelvis: Clinical staff was present for exam  External genitalia:  normal appearance of the external genitalia including Bartholin's and Sunset's glands.  :  urethral meatus normal; urethra normal:  Vaginal:  normal pink mucosa without prolapse or lesions. discharge present -  thin, watery, malodorous;  Cervix:  normal appearance.  Uterus:  normal size, shape and consistency.  Adnexa:  normal bimanual exam of the adnexa.  Rectal:  digital rectal exam not performed; anus visually normal appearing.        Assessment   Annual GYN exam  STD screening   Vaginal odor and discharge   Recurrent BV  PCOS     Plan   Pap was not done today.  I explained to Yasmin that the recommendations for Pap smear interval in a low risk patient has lengthened to 5 years time.  I told Yasmin she still needs to be seen in our office yearly for a full physical including breast and pelvic exam.   Leukorrhea swab collected for STD screening and for recurrent BV/vaginal odor.  Will empirically treat for BV with Flagyl twice a day for 1 week.  Will adjust treatment based on " swab results.  Information and samples of Clairvee given in clinic today.  Discussed may take up to 3 to 6 months to feel full effects of this supplement in preventing recurrent BV.  Patient voiced understanding.  Discussed importance of monthly cycles with PCOS, and to call for evaluation should she go 60 days or more without a cycle.  I discussed with Yasmin that she may be behind on needed vaccinations for HPV (Gardasil-9).  She may be able to obtain these vaccinations at her local pharmacy OR speak about obtaining them with her primary care.  If she does obtain her vaccines, I have asked Yasmin to let us know the date each vaccine was obtained so that her medical record could be updated in our system.  The importance of keeping all planned follow-up and taking all medications as prescribed was emphasized.  Follow up for annual exam in 1 year or sooner PRN      New Medications Ordered This Visit   Medications    metroNIDAZOLE (FLAGYL) 500 MG tablet     Sig: Take 1 tablet by mouth 2 (Two) Times a Day for 7 days.     Dispense:  14 tablet     Refill:  0          This note was electronically signed.    Lina Hull MD   May 29, 2025

## 2025-05-30 ENCOUNTER — PATIENT ROUNDING (BHMG ONLY) (OUTPATIENT)
Dept: OBSTETRICS AND GYNECOLOGY | Facility: CLINIC | Age: 40
End: 2025-05-30
Payer: COMMERCIAL

## 2025-05-30 NOTE — PROGRESS NOTES
My name is TITI Escalante    I am with ZOE MACK  [facility]  [facility] [facility] [address]  [place] [address] [phone]    I want to officially welcome you to our practice and ask about your recent visit.    Tell me about your visit with us.  What things went well?    We're always looking for ways to make our patients' experiences even better.  Do you have recommendations on ways we may improve?    Overall were you satisfied with your first visit to our practice?    I appreciate you taking the time to answer a few questions today.  Is there anything else I can do for you?    Thank you, and have a great day.

## 2025-06-09 RX ORDER — LEVOTHYROXINE SODIUM 100 UG/1
100 TABLET ORAL DAILY
Qty: 90 TABLET | Refills: 1 | Status: SHIPPED | OUTPATIENT
Start: 2025-06-09

## 2025-06-10 ENCOUNTER — OFFICE VISIT (OUTPATIENT)
Dept: INTERNAL MEDICINE | Age: 40
End: 2025-06-10
Payer: COMMERCIAL

## 2025-06-10 VITALS
SYSTOLIC BLOOD PRESSURE: 110 MMHG | HEART RATE: 104 BPM | OXYGEN SATURATION: 98 % | WEIGHT: 170.8 LBS | TEMPERATURE: 97.1 F | HEIGHT: 63 IN | BODY MASS INDEX: 30.26 KG/M2 | DIASTOLIC BLOOD PRESSURE: 68 MMHG

## 2025-06-10 DIAGNOSIS — F41.1 GAD (GENERALIZED ANXIETY DISORDER): ICD-10-CM

## 2025-06-10 DIAGNOSIS — E03.9 HYPOTHYROIDISM, UNSPECIFIED TYPE: ICD-10-CM

## 2025-06-10 DIAGNOSIS — E11.9 TYPE 2 DIABETES MELLITUS WITHOUT COMPLICATION, WITHOUT LONG-TERM CURRENT USE OF INSULIN: Primary | ICD-10-CM

## 2025-06-10 DIAGNOSIS — R22.32 MASS OF LEFT AXILLA: ICD-10-CM

## 2025-06-10 PROCEDURE — 99214 OFFICE O/P EST MOD 30 MIN: CPT | Performed by: PHYSICIAN ASSISTANT

## 2025-06-10 NOTE — PROGRESS NOTES
[facility]  [address]  [facility] [address]  Dept: [phone]  Dept Fax: [phone]  Loc: [phone]  Loc Fax: [phone]    Yasmin Singleton  1985    Follow Up Office Visit Note    History of Present Illness:  Patient 39-year-old female in today to follow-up for left axilla mass.  Ultrasound showed benign findings but recommended repeat in 3 months time span which would be next month.  Order placed.    Diabetes  Associated symptoms:     no chest pain and no fatigue    Hypoglycemia symptoms:     no confusion, no dizziness, no headaches and is not nervous/anxious        The following portions of the patient's history were reviewed and updated as appropriate: allergies, current medications, past family history, past medical history, past social history, past surgical history, and problem list.    Medications:    Current Outpatient Medications:   •  glipizide (GLUCOTROL) 5 MG tablet, Take 1 tablet by mouth 2 (Two) Times a Day Before Meals., Disp: 180 tablet, Rfl: 1  •  glucose blood test strip, 1 each by Other route 2 (Two) Times a Day With Meals. Use as instructed, Disp: 100 each, Rfl: 5  •  Lancets (freestyle) lancets, 1 each by Other route Daily. Use as instructed, Disp: 100 each, Rfl: 5  •  levothyroxine (SYNTHROID, LEVOTHROID) 100 MCG tablet, TAKE 1 TABLET BY MOUTH DAILY, Disp: 90 tablet, Rfl: 1  •  loratadine (CLARITIN) 10 MG tablet, Take 1 tablet by mouth Daily., Disp: 30 tablet, Rfl: 5  •  meloxicam (MOBIC) 7.5 MG tablet, Take 1 tablet by mouth Daily., Disp: 30 tablet, Rfl: 0  •  metFORMIN ER (GLUCOPHAGE-XR) 750 MG 24 hr tablet, Take 1 tablet by mouth Daily With Breakfast., Disp: 90 tablet, Rfl: 1  •  Ozempic, 2 MG/DOSE, 8 MG/3ML solution pen-injector, DIAL AND INJECT UNDER THE SKIN 2 MG WEEKLY, Disp: 3 mL, Rfl: 1  •  pantoprazole (PROTONIX) 20 MG EC tablet, Take 1 tablet by mouth Daily., Disp: 90 tablet, Rfl: 1  •  Prenatal  Vit-Fe Fumarate-FA (Prenatal 27-1) 27-1 MG tablet tablet, Take 1 tablet by mouth Daily., Disp: 90 each, Rfl: 2  •  spironolactone (ALDACTONE) 50 MG tablet, TAKE 1 TABLET BY MOUTH DAILY, Disp: 90 tablet, Rfl: 1  •  vitamin B-12 (CYANOCOBALAMIN) 1000 MCG tablet, Take 1 tablet by mouth Daily., Disp: 90 tablet, Rfl: 1  •  vitamin D (ERGOCALCIFEROL) 1.25 MG (52035 UT) capsule capsule, Take 1 capsule by mouth 1 (One) Time Per Week., Disp: 12 capsule, Rfl: 1  •  escitalopram (LEXAPRO) 5 MG tablet, Take 1 tablet by mouth Daily. (Patient not taking: Reported on 6/10/2025), Disp: 90 tablet, Rfl: 1    Subjective  No Known Allergies     Past Medical History:   Diagnosis Date   • Diabetes    • GERD (gastroesophageal reflux disease)    • History of recurrent UTIs    • Kidney stone    • Seasonal allergies        Past Surgical History:   Procedure Laterality Date   • KIDNEY STONE SURGERY  02/22/2024   • WISDOM TOOTH EXTRACTION  2015       Family History   Problem Relation Age of Onset   • Kidney failure Mother    • Cancer Father         Unsure what kind   • Hypertension Father    • Stroke Father    • Breast cancer Neg Hx    • Ovarian cancer Neg Hx         Social History     Socioeconomic History   • Marital status: Single   Tobacco Use   • Smoking status: Every Day     Current packs/day: 0.50     Average packs/day: 0.5 packs/day for 19.4 years (9.7 ttl pk-yrs)     Types: Cigarettes     Start date: 2006     Passive exposure: Past   • Smokeless tobacco: Never   Vaping Use   • Vaping status: Never Used   Substance and Sexual Activity   • Alcohol use: Not Currently     Comment: on occasion, at most 1-2 drinks a month   • Drug use: Not Currently     Frequency: 4.0 times per week     Types: Marijuana   • Sexual activity: Not Currently     Partners: Male     Birth control/protection: None       Review of Systems   Constitutional:  Negative for activity change, chills, fatigue, fever and unexpected weight change.   HENT:  Negative for  "congestion, ear pain, postnasal drip, sinus pressure and sore throat.    Eyes:  Negative for pain, discharge and redness.   Respiratory:  Negative for cough, shortness of breath and wheezing.    Cardiovascular:  Negative for chest pain, palpitations and leg swelling.   Gastrointestinal:  Negative for diarrhea, nausea and vomiting.   Endocrine: Negative for cold intolerance and heat intolerance.   Genitourinary:  Negative for decreased urine volume and dysuria.   Musculoskeletal:  Negative for arthralgias and myalgias.   Skin:  Negative for rash and wound.   Neurological:  Negative for dizziness, light-headedness and headaches.   Hematological:  Does not bruise/bleed easily.   Psychiatric/Behavioral:  Negative for confusion, dysphoric mood and sleep disturbance. The patient is not nervous/anxious.          Objective  Vitals:    06/10/25 0858   Pulse: 104   Temp: 97.1 °F (36.2 °C)   TempSrc: Temporal   SpO2: 98%   Weight: 77.5 kg (170 lb 12.8 oz)   Height: 160 cm (62.99\")     Body mass index is 30.26 kg/m².     Physical Exam  Physical Exam  Vitals and nursing note reviewed.   Constitutional:       General: She is not in acute distress.     Appearance: She is not ill-appearing.   HENT:      Head: Normocephalic.      Right Ear: Tympanic membrane, ear canal and external ear normal. There is no impacted cerumen.      Left Ear: Tympanic membrane, ear canal and external ear normal. There is no impacted cerumen.      Nose: No congestion or rhinorrhea.      Mouth/Throat:      Mouth: Mucous membranes are moist.      Pharynx: Oropharynx is clear. No oropharyngeal exudate or posterior oropharyngeal erythema.   Eyes:      General:         Right eye: No discharge.         Left eye: No discharge.      Extraocular Movements: Extraocular movements intact.      Conjunctiva/sclera: Conjunctivae normal.      Pupils: Pupils are equal, round, and reactive to light.   Cardiovascular:      Rate and Rhythm: Normal rate and regular rhythm.    "   Heart sounds: Normal heart sounds. No murmur heard.     No friction rub. No gallop.   Pulmonary:      Effort: Pulmonary effort is normal. No respiratory distress.      Breath sounds: Normal breath sounds. No wheezing.   Abdominal:      General: Bowel sounds are normal. There is no distension.      Palpations: Abdomen is soft. There is no mass.      Tenderness: There is no abdominal tenderness.   Musculoskeletal:         General: No swelling. Normal range of motion.      Cervical back: Normal range of motion. No tenderness.      Right lower leg: No edema.      Left lower leg: No edema.   Lymphadenopathy:      Cervical: No cervical adenopathy.   Skin:     Findings: No bruising, erythema or rash.   Neurological:      Mental Status: She is oriented to person, place, and time.      Gait: Gait normal.   Psychiatric:         Mood and Affect: Mood normal.         Behavior: Behavior normal.         Thought Content: Thought content normal.         Judgment: Judgment normal.       Diagnostic Data  Procedures    Assessment  Diagnoses and all orders for this visit:    1. Type 2 diabetes mellitus without complication, without long-term current use of insulin (Primary)    2. JACKIE (generalized anxiety disorder)    3. Hypothyroidism, unspecified type    4. Mass of left axilla        Plan    1. Type 2 diabetes mellitus without complication, without long-term current use of insulin (Primary)- blood sugars doing better back on metformin and glipizide. Increase ozempic to 1.25 mg q weekly.     2. JACKIE (generalized anxiety disorder)- well-controlled on Lexapro.  Keep same.  Continue to monitor.     3. Hypothyroidism, unspecified type- stable on Synthroid.    4.  Mass of left axilla- stable.  Repeat ultrasound in 1 month.      Return in about 3 months (around 9/10/2025) for Recheck.    Gerson Sifuentes PA-C  06/10/2025

## 2025-06-11 DIAGNOSIS — E11.9 TYPE 2 DIABETES MELLITUS WITHOUT COMPLICATION, WITHOUT LONG-TERM CURRENT USE OF INSULIN: ICD-10-CM

## 2025-06-11 RX ORDER — METFORMIN HYDROCHLORIDE 750 MG/1
750 TABLET, EXTENDED RELEASE ORAL
Qty: 90 TABLET | Refills: 1 | Status: SHIPPED | OUTPATIENT
Start: 2025-06-11

## 2025-06-26 ENCOUNTER — HOSPITAL ENCOUNTER (OUTPATIENT)
Dept: ULTRASOUND IMAGING | Facility: HOSPITAL | Age: 40
Discharge: HOME | End: 2025-06-26
Admitting: UROLOGY
Payer: COMMERCIAL

## 2025-06-26 DIAGNOSIS — N20.0 NEPHROLITHIASIS: ICD-10-CM

## 2025-06-26 PROCEDURE — 76775 US EXAM ABDO BACK WALL LIM: CPT

## 2025-07-01 ENCOUNTER — OFFICE VISIT (OUTPATIENT)
Dept: UROLOGY | Facility: CLINIC | Age: 40
End: 2025-07-01
Payer: COMMERCIAL

## 2025-07-01 VITALS — BODY MASS INDEX: 30.12 KG/M2 | WEIGHT: 170 LBS | HEIGHT: 63 IN

## 2025-07-01 DIAGNOSIS — N20.0 NEPHROLITHIASIS: Primary | ICD-10-CM

## 2025-07-01 PROCEDURE — 99213 OFFICE O/P EST LOW 20 MIN: CPT | Performed by: UROLOGY

## 2025-07-01 NOTE — PROGRESS NOTES
Office Note Kidney Stone      Patient Name: Yasmin Singleton  : 1985   MRN: [id number]     Chief Complaint: History of Nephrolithiasis/Ureterolithiasis       History of Present Illness: Yasmin Singleton is a 39 y.o. female who presents today for follow-up secondary to history of recurrent metabolic stone disease.  Denies any acute stone symptoms since last follow-up.  She is overall doing well.        Subjective      Review of System: Review of Systems   Genitourinary:  Negative for decreased urine volume, difficulty urinating, dysuria, enuresis, flank pain, frequency, hematuria and urgency.        I have reviewed the ROS documented by my clinical staff, updated as appropriate and I agree. Deon Dunbar MD    Past Medical History:   Past Medical History:   Diagnosis Date    Diabetes     GERD (gastroesophageal reflux disease)     History of recurrent UTIs     Kidney stone     Seasonal allergies        Past Surgical History:   Past Surgical History:   Procedure Laterality Date    KIDNEY STONE SURGERY  2024    WISDOM TOOTH EXTRACTION         Family History:   Family History   Problem Relation Age of Onset    Kidney failure Mother     Cancer Father         Unsure what kind    Hypertension Father     Stroke Father     Breast cancer Neg Hx     Ovarian cancer Neg Hx        Social History:   Social History     Socioeconomic History    Marital status: Single   Tobacco Use    Smoking status: Every Day     Current packs/day: 0.50     Average packs/day: 0.5 packs/day for 19.5 years (9.8 ttl pk-yrs)     Types: Cigarettes     Start date:      Passive exposure: Past    Smokeless tobacco: Never   Vaping Use    Vaping status: Never Used   Substance and Sexual Activity    Alcohol use: Not Currently     Comment: on occasion, at most 1-2 drinks a month    Drug use: Not Currently     Frequency: 4.0 times per week     Types: Marijuana    Sexual activity: Not Currently     Partners: Male     Birth  "control/protection: None       Medications:     Current Outpatient Medications:     glipizide (GLUCOTROL) 5 MG tablet, Take 1 tablet by mouth 2 (Two) Times a Day Before Meals., Disp: 180 tablet, Rfl: 1    glucose blood test strip, 1 each by Other route 2 (Two) Times a Day With Meals. Use as instructed, Disp: 100 each, Rfl: 5    Lancets (freestyle) lancets, 1 each by Other route Daily. Use as instructed, Disp: 100 each, Rfl: 5    levothyroxine (SYNTHROID, LEVOTHROID) 100 MCG tablet, TAKE 1 TABLET BY MOUTH DAILY, Disp: 90 tablet, Rfl: 1    loratadine (CLARITIN) 10 MG tablet, Take 1 tablet by mouth Daily., Disp: 30 tablet, Rfl: 5    meloxicam (MOBIC) 7.5 MG tablet, Take 1 tablet by mouth Daily., Disp: 30 tablet, Rfl: 0    metFORMIN ER (GLUCOPHAGE-XR) 750 MG 24 hr tablet, TAKE 1 TABLET BY MOUTH DAILY WITH BREAKFAST, Disp: 90 tablet, Rfl: 1    Ozempic, 2 MG/DOSE, 8 MG/3ML solution pen-injector, DIAL AND INJECT UNDER THE SKIN 2 MG WEEKLY, Disp: 3 mL, Rfl: 1    pantoprazole (PROTONIX) 20 MG EC tablet, Take 1 tablet by mouth Daily., Disp: 90 tablet, Rfl: 1    Prenatal Vit-Fe Fumarate-FA (Prenatal 27-1) 27-1 MG tablet tablet, Take 1 tablet by mouth Daily., Disp: 90 each, Rfl: 2    spironolactone (ALDACTONE) 50 MG tablet, TAKE 1 TABLET BY MOUTH DAILY, Disp: 90 tablet, Rfl: 1    vitamin B-12 (CYANOCOBALAMIN) 1000 MCG tablet, Take 1 tablet by mouth Daily., Disp: 90 tablet, Rfl: 1    vitamin D (ERGOCALCIFEROL) 1.25 MG (63567 UT) capsule capsule, Take 1 capsule by mouth 1 (One) Time Per Week., Disp: 12 capsule, Rfl: 1    escitalopram (LEXAPRO) 5 MG tablet, Take 1 tablet by mouth Daily. (Patient not taking: Reported on 5/29/2025), Disp: 90 tablet, Rfl: 1    Allergies:   No Known Allergies    Objective     Physical Exam:   Vital Signs:   Vitals:    07/01/25 1435   Weight: 77.1 kg (170 lb)   Height: 160 cm (62.99\")     Body mass index is 30.12 kg/m².     Physical Exam  Vitals and nursing note reviewed.   Constitutional:       " Appearance: Normal appearance.   HENT:      Head: Normocephalic and atraumatic.   Cardiovascular:      Comments: Well perfused  Pulmonary:      Effort: Pulmonary effort is normal.   Abdominal:      General: Abdomen is flat.      Palpations: Abdomen is soft.   Musculoskeletal:         General: Normal range of motion.   Skin:     General: Skin is warm and dry.   Neurological:      General: No focal deficit present.      Mental Status: She is alert and oriented to person, place, and time. Mental status is at baseline.   Psychiatric:         Mood and Affect: Mood normal.         Behavior: Behavior normal.         Thought Content: Thought content normal.         Judgment: Judgment normal.         Labs:   Brief Urine Lab Results  (Last result in the past 365 days)        Color   Clarity   Blood   Leuk Est   Nitrite   Protein   CREAT   Urine HCG        04/28/25 1038             164.1                      Lab Results   Component Value Date    GLUCOSE 124 (H) 04/28/2025    CALCIUM 9.1 04/28/2025     04/28/2025    K 3.8 04/28/2025    CO2 23.4 04/28/2025     04/28/2025    BUN 10 04/28/2025    CREATININE 0.72 04/28/2025    EGFRIFAFRI 135 06/02/2021    EGFRIFNONA 117 06/02/2021    BCR 13.9 04/28/2025    ANIONGAP 11.6 04/28/2025       Lab Results   Component Value Date    WBC 6.16 04/28/2025    HGB 13.7 04/28/2025    HCT 41.5 04/28/2025    MCV 89.6 04/28/2025     04/28/2025         Images:   US Renal Bilateral  Result Date: 6/26/2025  Impression: Kidney ultrasound is within normal limits. Electronically Signed: Sven Bond MD  6/26/2025 3:53 PM EDT  Workstation ID: [id number]        Measures:   Tobacco:   Yasmin Singleton  reports that she has been smoking cigarettes. She started smoking about 19 years ago. She has a 9.8 pack-year smoking history. She has been exposed to tobacco smoke. She has never used smokeless tobacco. I have educated her on the risk of diseases from using tobacco product    Assessment /  Plan      Assessment/Plan:   Yasmin Singleton is a 39 y.o. female who presented today with nephrolithiasis/ureterolithiasis.  Dated ultrasound with no evidence of nephrolithiasis or hydronephrosis.  We have continue to encourage conservative measures to reduce stone risk.  She will follow-up in 1 year with ultrasound alert with acute stone symptoms prior.    Diagnoses and all orders for this visit:    1. Nephrolithiasis (Primary)  -     US Renal Bilateral; Future           Follow Up:   Return in about 1 year (around 7/1/2026) for Recheck, Follow up after Imaging.    I spent approximately 20 minutes providing clinical care for this patient; including review of patient's chart and provider documentation, face to face time spent with patient in examination room (obtaining history, performing physical exam, discussing diagnosis and management options), placing orders, and completing patient documentation.     Deon Dunbar MD  [facility] [place]

## 2025-07-08 RX ORDER — SEMAGLUTIDE 2.68 MG/ML
INJECTION, SOLUTION SUBCUTANEOUS
Qty: 3 ML | Refills: 1 | Status: SHIPPED | OUTPATIENT
Start: 2025-07-08

## 2025-08-13 DIAGNOSIS — E55.9 VITAMIN D DEFICIENCY: ICD-10-CM

## 2025-08-13 RX ORDER — ERGOCALCIFEROL 1.25 MG/1
50000 CAPSULE, LIQUID FILLED ORAL WEEKLY
Qty: 12 CAPSULE | Refills: 1 | Status: SHIPPED | OUTPATIENT
Start: 2025-08-13

## 2025-08-18 ENCOUNTER — OFFICE VISIT (OUTPATIENT)
Dept: INTERNAL MEDICINE | Age: 40
End: 2025-08-18
Payer: COMMERCIAL

## 2025-08-18 VITALS
DIASTOLIC BLOOD PRESSURE: 78 MMHG | OXYGEN SATURATION: 99 % | WEIGHT: 159 LBS | TEMPERATURE: 96.9 F | BODY MASS INDEX: 28.17 KG/M2 | HEIGHT: 63 IN | HEART RATE: 110 BPM | SYSTOLIC BLOOD PRESSURE: 108 MMHG

## 2025-08-18 DIAGNOSIS — K64.9 HEMORRHOIDS, UNSPECIFIED HEMORRHOID TYPE: Primary | ICD-10-CM

## 2025-08-18 DIAGNOSIS — F41.1 GAD (GENERALIZED ANXIETY DISORDER): ICD-10-CM

## 2025-08-18 DIAGNOSIS — E11.9 TYPE 2 DIABETES MELLITUS WITHOUT COMPLICATION, WITHOUT LONG-TERM CURRENT USE OF INSULIN: ICD-10-CM

## 2025-08-18 DIAGNOSIS — E03.9 HYPOTHYROIDISM, UNSPECIFIED TYPE: ICD-10-CM

## 2025-08-18 PROCEDURE — 99214 OFFICE O/P EST MOD 30 MIN: CPT | Performed by: PHYSICIAN ASSISTANT

## 2025-08-18 RX ORDER — PRAMOXINE HYDROCHLORIDE 10 MG/G
AEROSOL, FOAM TOPICAL
Qty: 15 G | Refills: 1 | Status: SHIPPED | OUTPATIENT
Start: 2025-08-18 | End: 2025-08-21

## 2025-08-20 ENCOUNTER — TELEPHONE (OUTPATIENT)
Dept: INTERNAL MEDICINE | Age: 40
End: 2025-08-20
Payer: COMMERCIAL

## 2025-08-21 RX ORDER — HYDROCORTISONE ACETATE 25 MG/1
25 SUPPOSITORY RECTAL 2 TIMES DAILY PRN
Qty: 100 EACH | Refills: 1 | Status: SHIPPED | OUTPATIENT
Start: 2025-08-21 | End: 2025-08-21

## 2025-08-26 ENCOUNTER — OFFICE VISIT (OUTPATIENT)
Dept: SLEEP MEDICINE | Age: 40
End: 2025-08-26
Payer: COMMERCIAL

## 2025-08-26 VITALS
TEMPERATURE: 97 F | HEIGHT: 63 IN | DIASTOLIC BLOOD PRESSURE: 60 MMHG | BODY MASS INDEX: 27.38 KG/M2 | HEART RATE: 95 BPM | WEIGHT: 154.5 LBS | OXYGEN SATURATION: 98 % | SYSTOLIC BLOOD PRESSURE: 100 MMHG

## 2025-08-26 DIAGNOSIS — G47.19 EXCESSIVE DAYTIME SLEEPINESS: ICD-10-CM

## 2025-08-26 DIAGNOSIS — R06.83 SNORING: ICD-10-CM

## 2025-08-26 DIAGNOSIS — E66.3 OVERWEIGHT WITH BODY MASS INDEX (BMI) 25.0-29.9: ICD-10-CM

## 2025-08-26 DIAGNOSIS — G47.30 SLEEP APNEA, UNSPECIFIED TYPE: Primary | ICD-10-CM

## 2025-08-26 PROCEDURE — 99213 OFFICE O/P EST LOW 20 MIN: CPT | Performed by: NURSE PRACTITIONER
